# Patient Record
Sex: FEMALE | Race: WHITE | NOT HISPANIC OR LATINO | Employment: FULL TIME
[De-identification: names, ages, dates, MRNs, and addresses within clinical notes are randomized per-mention and may not be internally consistent; named-entity substitution may affect disease eponyms.]

---

## 2017-01-04 ENCOUNTER — RECORDS - HEALTHEAST (OUTPATIENT)
Dept: ADMINISTRATIVE | Facility: OTHER | Age: 67
End: 2017-01-04

## 2017-01-24 ENCOUNTER — RECORDS - HEALTHEAST (OUTPATIENT)
Dept: ADMINISTRATIVE | Facility: OTHER | Age: 67
End: 2017-01-24

## 2017-01-31 ENCOUNTER — RECORDS - HEALTHEAST (OUTPATIENT)
Dept: ADMINISTRATIVE | Facility: OTHER | Age: 67
End: 2017-01-31

## 2017-03-27 ENCOUNTER — OFFICE VISIT - HEALTHEAST (OUTPATIENT)
Dept: FAMILY MEDICINE | Facility: CLINIC | Age: 67
End: 2017-03-27

## 2017-03-27 DIAGNOSIS — Z01.818 PREOP EXAMINATION: ICD-10-CM

## 2017-03-27 DIAGNOSIS — Z78.0 POST-MENOPAUSAL: ICD-10-CM

## 2017-03-27 DIAGNOSIS — E03.9 HYPOTHYROID: ICD-10-CM

## 2017-03-27 DIAGNOSIS — H26.9 CATARACT, RIGHT: ICD-10-CM

## 2017-03-27 ASSESSMENT — MIFFLIN-ST. JEOR: SCORE: 1141.38

## 2017-04-28 ENCOUNTER — RECORDS - HEALTHEAST (OUTPATIENT)
Dept: ADMINISTRATIVE | Facility: OTHER | Age: 67
End: 2017-04-28

## 2017-11-06 ENCOUNTER — COMMUNICATION - HEALTHEAST (OUTPATIENT)
Dept: FAMILY MEDICINE | Facility: CLINIC | Age: 67
End: 2017-11-06

## 2017-11-06 DIAGNOSIS — E03.9 HYPOTHYROID: ICD-10-CM

## 2017-11-14 ENCOUNTER — COMMUNICATION - HEALTHEAST (OUTPATIENT)
Dept: TELEHEALTH | Facility: CLINIC | Age: 67
End: 2017-11-14

## 2018-01-08 ENCOUNTER — OFFICE VISIT - HEALTHEAST (OUTPATIENT)
Dept: FAMILY MEDICINE | Facility: CLINIC | Age: 68
End: 2018-01-08

## 2018-01-08 DIAGNOSIS — E03.9 HYPOTHYROIDISM: ICD-10-CM

## 2018-01-08 DIAGNOSIS — Z00.00 HEALTHCARE MAINTENANCE: ICD-10-CM

## 2018-01-08 DIAGNOSIS — H91.90 HEARING LOSS: ICD-10-CM

## 2018-01-08 DIAGNOSIS — M06.9 RHEUMATOID ARTHRITIS (H): ICD-10-CM

## 2018-01-08 LAB
CHOLEST SERPL-MCNC: 241 MG/DL
FASTING STATUS PATIENT QL REPORTED: NO
HDLC SERPL-MCNC: 93 MG/DL
LDLC SERPL CALC-MCNC: 131 MG/DL
TRIGL SERPL-MCNC: 87 MG/DL
TSH SERPL DL<=0.005 MIU/L-ACNC: 1.77 UIU/ML (ref 0.3–5)

## 2018-01-08 RX ORDER — IBUPROFEN 600 MG/1
600 TABLET, FILM COATED ORAL EVERY 6 HOURS PRN
Status: SHIPPED | COMMUNITY
Start: 2018-01-08

## 2018-01-31 ENCOUNTER — COMMUNICATION - HEALTHEAST (OUTPATIENT)
Dept: SCHEDULING | Facility: CLINIC | Age: 68
End: 2018-01-31

## 2018-03-19 ENCOUNTER — COMMUNICATION - HEALTHEAST (OUTPATIENT)
Dept: FAMILY MEDICINE | Facility: CLINIC | Age: 68
End: 2018-03-19

## 2018-03-19 DIAGNOSIS — E03.9 HYPOTHYROID: ICD-10-CM

## 2018-03-27 ENCOUNTER — RECORDS - HEALTHEAST (OUTPATIENT)
Dept: ADMINISTRATIVE | Facility: OTHER | Age: 68
End: 2018-03-27

## 2018-11-08 ENCOUNTER — COMMUNICATION - HEALTHEAST (OUTPATIENT)
Dept: FAMILY MEDICINE | Facility: CLINIC | Age: 68
End: 2018-11-08

## 2018-11-29 ENCOUNTER — COMMUNICATION - HEALTHEAST (OUTPATIENT)
Dept: FAMILY MEDICINE | Facility: CLINIC | Age: 68
End: 2018-11-29

## 2018-11-29 DIAGNOSIS — E03.9 HYPOTHYROID: ICD-10-CM

## 2018-12-03 ENCOUNTER — RECORDS - HEALTHEAST (OUTPATIENT)
Dept: ADMINISTRATIVE | Facility: OTHER | Age: 68
End: 2018-12-03

## 2019-01-24 ENCOUNTER — OFFICE VISIT - HEALTHEAST (OUTPATIENT)
Dept: FAMILY MEDICINE | Facility: CLINIC | Age: 69
End: 2019-01-24

## 2019-01-24 DIAGNOSIS — M17.0 PRIMARY OSTEOARTHRITIS OF BOTH KNEES: ICD-10-CM

## 2019-01-24 DIAGNOSIS — E03.9 HYPOTHYROIDISM, UNSPECIFIED TYPE: ICD-10-CM

## 2019-01-24 DIAGNOSIS — Z00.00 ROUTINE GENERAL MEDICAL EXAMINATION AT A HEALTH CARE FACILITY: ICD-10-CM

## 2019-01-24 DIAGNOSIS — Z12.31 VISIT FOR SCREENING MAMMOGRAM: ICD-10-CM

## 2019-01-24 LAB
CHOLEST SERPL-MCNC: 235 MG/DL
FASTING STATUS PATIENT QL REPORTED: YES
HDLC SERPL-MCNC: 104 MG/DL
LDLC SERPL CALC-MCNC: 112 MG/DL
TRIGL SERPL-MCNC: 93 MG/DL
TSH SERPL DL<=0.005 MIU/L-ACNC: 2.82 UIU/ML (ref 0.3–5)

## 2019-01-24 ASSESSMENT — MIFFLIN-ST. JEOR: SCORE: 1177.53

## 2019-02-19 ENCOUNTER — RECORDS - HEALTHEAST (OUTPATIENT)
Dept: MAMMOGRAPHY | Facility: CLINIC | Age: 69
End: 2019-02-19

## 2019-02-19 DIAGNOSIS — Z12.31 ENCOUNTER FOR SCREENING MAMMOGRAM FOR MALIGNANT NEOPLASM OF BREAST: ICD-10-CM

## 2019-03-14 ENCOUNTER — COMMUNICATION - HEALTHEAST (OUTPATIENT)
Dept: FAMILY MEDICINE | Facility: CLINIC | Age: 69
End: 2019-03-14

## 2019-03-14 DIAGNOSIS — E03.9 HYPOTHYROID: ICD-10-CM

## 2019-03-18 ENCOUNTER — OFFICE VISIT - HEALTHEAST (OUTPATIENT)
Dept: FAMILY MEDICINE | Facility: CLINIC | Age: 69
End: 2019-03-18

## 2019-03-18 DIAGNOSIS — R03.0 ELEVATED BP WITHOUT DIAGNOSIS OF HYPERTENSION: ICD-10-CM

## 2019-03-18 DIAGNOSIS — E03.9 HYPOTHYROIDISM, UNSPECIFIED TYPE: ICD-10-CM

## 2019-03-18 DIAGNOSIS — M17.0 PRIMARY OSTEOARTHRITIS OF BOTH KNEES: ICD-10-CM

## 2019-03-18 DIAGNOSIS — M85.89 OSTEOPENIA OF MULTIPLE SITES: ICD-10-CM

## 2019-03-18 DIAGNOSIS — Z76.89 ESTABLISHING CARE WITH NEW DOCTOR, ENCOUNTER FOR: ICD-10-CM

## 2019-03-18 DIAGNOSIS — M06.9 RHEUMATOID ARTHRITIS, INVOLVING UNSPECIFIED SITE, UNSPECIFIED RHEUMATOID FACTOR PRESENCE: ICD-10-CM

## 2019-04-01 ENCOUNTER — AMBULATORY - HEALTHEAST (OUTPATIENT)
Dept: NURSING | Facility: CLINIC | Age: 69
End: 2019-04-01

## 2019-08-26 ENCOUNTER — RECORDS - HEALTHEAST (OUTPATIENT)
Dept: ADMINISTRATIVE | Facility: OTHER | Age: 69
End: 2019-08-26

## 2019-09-23 ENCOUNTER — OFFICE VISIT - HEALTHEAST (OUTPATIENT)
Dept: FAMILY MEDICINE | Facility: CLINIC | Age: 69
End: 2019-09-23

## 2019-09-23 DIAGNOSIS — E03.9 HYPOTHYROIDISM, UNSPECIFIED TYPE: ICD-10-CM

## 2019-09-23 DIAGNOSIS — Z01.818 ENCOUNTER FOR PREOPERATIVE EXAMINATION FOR GENERAL SURGICAL PROCEDURE: ICD-10-CM

## 2019-09-23 DIAGNOSIS — H26.9 CATARACT OF LEFT EYE, UNSPECIFIED CATARACT TYPE: ICD-10-CM

## 2019-09-23 DIAGNOSIS — M85.89 OSTEOPENIA OF MULTIPLE SITES: ICD-10-CM

## 2019-09-23 LAB
ERYTHROCYTE [DISTWIDTH] IN BLOOD BY AUTOMATED COUNT: 11 % (ref 11–14.5)
HCT VFR BLD AUTO: 41.2 % (ref 35–47)
HGB BLD-MCNC: 13.9 G/DL (ref 12–16)
MCH RBC QN AUTO: 31.6 PG (ref 27–34)
MCHC RBC AUTO-ENTMCNC: 33.7 G/DL (ref 32–36)
MCV RBC AUTO: 94 FL (ref 80–100)
PLATELET # BLD AUTO: 234 THOU/UL (ref 140–440)
PMV BLD AUTO: 7.7 FL (ref 7–10)
RBC # BLD AUTO: 4.4 MILL/UL (ref 3.8–5.4)
WBC: 6.3 THOU/UL (ref 4–11)

## 2019-09-23 ASSESSMENT — MIFFLIN-ST. JEOR: SCORE: 1184.7

## 2019-09-24 ENCOUNTER — COMMUNICATION - HEALTHEAST (OUTPATIENT)
Dept: FAMILY MEDICINE | Facility: CLINIC | Age: 69
End: 2019-09-24

## 2019-09-24 LAB
ALBUMIN SERPL-MCNC: 4 G/DL (ref 3.5–5)
ALP SERPL-CCNC: 70 U/L (ref 45–120)
ALT SERPL W P-5'-P-CCNC: 13 U/L (ref 0–45)
ANION GAP SERPL CALCULATED.3IONS-SCNC: 7 MMOL/L (ref 5–18)
AST SERPL W P-5'-P-CCNC: 19 U/L (ref 0–40)
BILIRUB SERPL-MCNC: 0.4 MG/DL (ref 0–1)
BUN SERPL-MCNC: 22 MG/DL (ref 8–28)
CALCIUM SERPL-MCNC: 9.9 MG/DL (ref 8.5–10.5)
CHLORIDE BLD-SCNC: 102 MMOL/L (ref 98–107)
CO2 SERPL-SCNC: 28 MMOL/L (ref 22–31)
CREAT SERPL-MCNC: 1 MG/DL (ref 0.6–1.1)
GFR SERPL CREATININE-BSD FRML MDRD: 55 ML/MIN/1.73M2
GLUCOSE BLD-MCNC: 89 MG/DL (ref 70–125)
POTASSIUM BLD-SCNC: 5.3 MMOL/L (ref 3.5–5)
PROT SERPL-MCNC: 7.5 G/DL (ref 6–8)
SODIUM SERPL-SCNC: 137 MMOL/L (ref 136–145)
TSH SERPL DL<=0.005 MIU/L-ACNC: 2.64 UIU/ML (ref 0.3–5)

## 2019-09-26 ENCOUNTER — AMBULATORY - HEALTHEAST (OUTPATIENT)
Dept: SCHEDULING | Facility: CLINIC | Age: 69
End: 2019-09-26

## 2019-09-26 DIAGNOSIS — M85.89 OSTEOPENIA OF MULTIPLE SITES: ICD-10-CM

## 2019-09-29 ENCOUNTER — AMBULATORY - HEALTHEAST (OUTPATIENT)
Dept: FAMILY MEDICINE | Facility: CLINIC | Age: 69
End: 2019-09-29

## 2019-09-29 DIAGNOSIS — E87.5 HYPERKALEMIA: ICD-10-CM

## 2019-10-18 ENCOUNTER — AMBULATORY - HEALTHEAST (OUTPATIENT)
Dept: FAMILY MEDICINE | Facility: CLINIC | Age: 69
End: 2019-10-18

## 2019-10-18 DIAGNOSIS — M85.80 OSTEOPENIA WITH HIGH RISK OF FRACTURE: ICD-10-CM

## 2019-10-18 DIAGNOSIS — M85.89 OSTEOPENIA OF MULTIPLE SITES: ICD-10-CM

## 2019-10-22 ENCOUNTER — COMMUNICATION - HEALTHEAST (OUTPATIENT)
Dept: PHARMACY | Facility: CLINIC | Age: 69
End: 2019-10-22

## 2019-10-28 ENCOUNTER — COMMUNICATION - HEALTHEAST (OUTPATIENT)
Dept: NURSING | Facility: CLINIC | Age: 69
End: 2019-10-28

## 2019-10-29 ENCOUNTER — COMMUNICATION - HEALTHEAST (OUTPATIENT)
Dept: NURSING | Facility: CLINIC | Age: 69
End: 2019-10-29

## 2019-11-08 ENCOUNTER — COMMUNICATION - HEALTHEAST (OUTPATIENT)
Dept: SCHEDULING | Facility: CLINIC | Age: 69
End: 2019-11-08

## 2019-11-12 ENCOUNTER — COMMUNICATION - HEALTHEAST (OUTPATIENT)
Dept: FAMILY MEDICINE | Facility: CLINIC | Age: 69
End: 2019-11-12

## 2019-11-12 DIAGNOSIS — M06.9 RHEUMATOID ARTHRITIS, INVOLVING UNSPECIFIED SITE, UNSPECIFIED RHEUMATOID FACTOR PRESENCE: ICD-10-CM

## 2020-02-18 ENCOUNTER — COMMUNICATION - HEALTHEAST (OUTPATIENT)
Dept: FAMILY MEDICINE | Facility: CLINIC | Age: 70
End: 2020-02-18

## 2020-02-18 DIAGNOSIS — E03.9 HYPOTHYROID: ICD-10-CM

## 2020-02-20 ENCOUNTER — COMMUNICATION - HEALTHEAST (OUTPATIENT)
Dept: FAMILY MEDICINE | Facility: CLINIC | Age: 70
End: 2020-02-20

## 2020-03-02 ENCOUNTER — COMMUNICATION - HEALTHEAST (OUTPATIENT)
Dept: SCHEDULING | Facility: CLINIC | Age: 70
End: 2020-03-02

## 2020-03-02 ENCOUNTER — OFFICE VISIT - HEALTHEAST (OUTPATIENT)
Dept: FAMILY MEDICINE | Facility: CLINIC | Age: 70
End: 2020-03-02

## 2020-03-02 DIAGNOSIS — J06.9 VIRAL URI: ICD-10-CM

## 2020-03-02 DIAGNOSIS — B30.9 ACUTE VIRAL CONJUNCTIVITIS OF BOTH EYES: ICD-10-CM

## 2020-03-02 ASSESSMENT — MIFFLIN-ST. JEOR: SCORE: 1217.58

## 2020-04-13 ENCOUNTER — COMMUNICATION - HEALTHEAST (OUTPATIENT)
Dept: FAMILY MEDICINE | Facility: CLINIC | Age: 70
End: 2020-04-13

## 2020-04-28 ENCOUNTER — COMMUNICATION - HEALTHEAST (OUTPATIENT)
Dept: FAMILY MEDICINE | Facility: CLINIC | Age: 70
End: 2020-04-28

## 2020-04-28 DIAGNOSIS — M06.9 RHEUMATOID ARTHRITIS, INVOLVING UNSPECIFIED SITE, UNSPECIFIED RHEUMATOID FACTOR PRESENCE: ICD-10-CM

## 2020-05-20 ENCOUNTER — RECORDS - HEALTHEAST (OUTPATIENT)
Dept: ADMINISTRATIVE | Facility: OTHER | Age: 70
End: 2020-05-20

## 2020-08-17 ENCOUNTER — COMMUNICATION - HEALTHEAST (OUTPATIENT)
Dept: FAMILY MEDICINE | Facility: CLINIC | Age: 70
End: 2020-08-17

## 2020-08-17 DIAGNOSIS — E03.9 HYPOTHYROID: ICD-10-CM

## 2020-11-20 ENCOUNTER — RECORDS - HEALTHEAST (OUTPATIENT)
Dept: ADMINISTRATIVE | Facility: OTHER | Age: 70
End: 2020-11-20

## 2020-11-20 ENCOUNTER — COMMUNICATION - HEALTHEAST (OUTPATIENT)
Dept: FAMILY MEDICINE | Facility: CLINIC | Age: 70
End: 2020-11-20

## 2020-11-20 DIAGNOSIS — E03.9 HYPOTHYROID: ICD-10-CM

## 2020-12-04 ENCOUNTER — OFFICE VISIT - HEALTHEAST (OUTPATIENT)
Dept: FAMILY MEDICINE | Facility: CLINIC | Age: 70
End: 2020-12-04

## 2020-12-04 DIAGNOSIS — Z11.59 ENCOUNTER FOR HEPATITIS C SCREENING TEST FOR LOW RISK PATIENT: ICD-10-CM

## 2020-12-04 DIAGNOSIS — Z71.89 ADVANCED DIRECTIVES, COUNSELING/DISCUSSION: ICD-10-CM

## 2020-12-04 DIAGNOSIS — E03.9 HYPOTHYROIDISM, UNSPECIFIED TYPE: ICD-10-CM

## 2020-12-04 DIAGNOSIS — M85.80 OSTEOPENIA WITH HIGH RISK OF FRACTURE: ICD-10-CM

## 2020-12-04 DIAGNOSIS — Z12.31 VISIT FOR SCREENING MAMMOGRAM: ICD-10-CM

## 2020-12-04 DIAGNOSIS — M15.0 PRIMARY OSTEOARTHRITIS INVOLVING MULTIPLE JOINTS: ICD-10-CM

## 2020-12-04 DIAGNOSIS — Z13.220 LIPID SCREENING: ICD-10-CM

## 2020-12-04 DIAGNOSIS — Z00.00 ROUTINE GENERAL MEDICAL EXAMINATION AT A HEALTH CARE FACILITY: ICD-10-CM

## 2020-12-04 LAB
ALBUMIN SERPL-MCNC: 4 G/DL (ref 3.5–5)
ALP SERPL-CCNC: 71 U/L (ref 45–120)
ALT SERPL W P-5'-P-CCNC: 14 U/L (ref 0–45)
ANION GAP SERPL CALCULATED.3IONS-SCNC: 11 MMOL/L (ref 5–18)
AST SERPL W P-5'-P-CCNC: 18 U/L (ref 0–40)
BILIRUB SERPL-MCNC: 0.6 MG/DL (ref 0–1)
BUN SERPL-MCNC: 19 MG/DL (ref 8–28)
CALCIUM SERPL-MCNC: 9.8 MG/DL (ref 8.5–10.5)
CHLORIDE BLD-SCNC: 103 MMOL/L (ref 98–107)
CHOLEST SERPL-MCNC: 220 MG/DL
CO2 SERPL-SCNC: 25 MMOL/L (ref 22–31)
CREAT SERPL-MCNC: 0.84 MG/DL (ref 0.6–1.1)
ERYTHROCYTE [DISTWIDTH] IN BLOOD BY AUTOMATED COUNT: 10.9 % (ref 11–14.5)
FASTING STATUS PATIENT QL REPORTED: YES
GFR SERPL CREATININE-BSD FRML MDRD: >60 ML/MIN/1.73M2
GLUCOSE BLD-MCNC: 102 MG/DL (ref 70–125)
HCT VFR BLD AUTO: 45.5 % (ref 35–47)
HDLC SERPL-MCNC: 88 MG/DL
HGB BLD-MCNC: 15 G/DL (ref 12–16)
LDLC SERPL CALC-MCNC: 113 MG/DL
MCH RBC QN AUTO: 30.5 PG (ref 27–34)
MCHC RBC AUTO-ENTMCNC: 33 G/DL (ref 32–36)
MCV RBC AUTO: 92 FL (ref 80–100)
PLATELET # BLD AUTO: 266 THOU/UL (ref 140–440)
PMV BLD AUTO: 7.8 FL (ref 7–10)
POTASSIUM BLD-SCNC: 4.8 MMOL/L (ref 3.5–5)
PROT SERPL-MCNC: 7.6 G/DL (ref 6–8)
RBC # BLD AUTO: 4.92 MILL/UL (ref 3.8–5.4)
SODIUM SERPL-SCNC: 139 MMOL/L (ref 136–145)
TRIGL SERPL-MCNC: 95 MG/DL
TSH SERPL DL<=0.005 MIU/L-ACNC: 3.04 UIU/ML (ref 0.3–5)
WBC: 6 THOU/UL (ref 4–11)

## 2020-12-04 ASSESSMENT — MIFFLIN-ST. JEOR: SCORE: 1208.28

## 2020-12-07 LAB
25(OH)D3 SERPL-MCNC: 90 NG/ML (ref 30–80)
25(OH)D3 SERPL-MCNC: 90 NG/ML (ref 30–80)
HCV AB SERPL QL IA: NEGATIVE

## 2021-01-18 ENCOUNTER — COMMUNICATION - HEALTHEAST (OUTPATIENT)
Dept: FAMILY MEDICINE | Facility: CLINIC | Age: 71
End: 2021-01-18

## 2021-01-18 DIAGNOSIS — E03.9 HYPOTHYROID: ICD-10-CM

## 2021-01-18 DIAGNOSIS — M06.9 RHEUMATOID ARTHRITIS (H): ICD-10-CM

## 2021-01-18 RX ORDER — LEVOTHYROXINE SODIUM 50 UG/1
50 TABLET ORAL DAILY
Qty: 90 TABLET | Refills: 3 | Status: SHIPPED | OUTPATIENT
Start: 2021-01-18 | End: 2021-11-18

## 2021-01-18 RX ORDER — GABAPENTIN 600 MG/1
600 TABLET ORAL AT BEDTIME
Qty: 90 TABLET | Refills: 3 | Status: SHIPPED | OUTPATIENT
Start: 2021-01-18 | End: 2021-12-24

## 2021-02-22 ENCOUNTER — RECORDS - HEALTHEAST (OUTPATIENT)
Dept: MAMMOGRAPHY | Facility: CLINIC | Age: 71
End: 2021-02-22

## 2021-02-22 DIAGNOSIS — Z12.31 ENCOUNTER FOR SCREENING MAMMOGRAM FOR MALIGNANT NEOPLASM OF BREAST: ICD-10-CM

## 2021-03-04 ENCOUNTER — AMBULATORY - HEALTHEAST (OUTPATIENT)
Dept: NURSING | Facility: CLINIC | Age: 71
End: 2021-03-04

## 2021-03-25 ENCOUNTER — AMBULATORY - HEALTHEAST (OUTPATIENT)
Dept: NURSING | Facility: CLINIC | Age: 71
End: 2021-03-25

## 2021-04-30 ENCOUNTER — RECORDS - HEALTHEAST (OUTPATIENT)
Dept: ADMINISTRATIVE | Facility: OTHER | Age: 71
End: 2021-04-30

## 2021-05-25 ENCOUNTER — RECORDS - HEALTHEAST (OUTPATIENT)
Dept: ADMINISTRATIVE | Facility: OTHER | Age: 71
End: 2021-05-25

## 2021-05-25 ENCOUNTER — RECORDS - HEALTHEAST (OUTPATIENT)
Dept: ADMINISTRATIVE | Facility: CLINIC | Age: 71
End: 2021-05-25

## 2021-05-26 ENCOUNTER — RECORDS - HEALTHEAST (OUTPATIENT)
Dept: ADMINISTRATIVE | Facility: CLINIC | Age: 71
End: 2021-05-26

## 2021-05-27 NOTE — PROGRESS NOTES
I met with Lesly Hutchinson at the request of Dr Jones to recheck her blood pressure.  Blood pressure medications on the MAR were reviewed with patient.    Patient has taken all medications as per usual regimen: N/A  Patient reports tolerating them without any issues or concerns: N/A    Vitals:    04/01/19 0851   BP: 131/69   Pulse: 90       Blood pressure was taken, previous encounter was reviewed, recorded blood pressure below 140/90.  Patient was discharged and the note will be sent to the provider for final review.

## 2021-05-27 NOTE — PROGRESS NOTES
Please let patient know: I have reviewed her blood pressure check. In good range. No medication needed at this time.

## 2021-05-28 ENCOUNTER — RECORDS - HEALTHEAST (OUTPATIENT)
Dept: ADMINISTRATIVE | Facility: CLINIC | Age: 71
End: 2021-05-28

## 2021-05-29 ENCOUNTER — RECORDS - HEALTHEAST (OUTPATIENT)
Dept: ADMINISTRATIVE | Facility: CLINIC | Age: 71
End: 2021-05-29

## 2021-05-30 ENCOUNTER — RECORDS - HEALTHEAST (OUTPATIENT)
Dept: ADMINISTRATIVE | Facility: CLINIC | Age: 71
End: 2021-05-30

## 2021-05-30 VITALS — BODY MASS INDEX: 24.33 KG/M2 | HEIGHT: 64 IN | WEIGHT: 142.5 LBS

## 2021-05-31 VITALS — WEIGHT: 151.5 LBS | BODY MASS INDEX: 26 KG/M2

## 2021-06-01 ENCOUNTER — RECORDS - HEALTHEAST (OUTPATIENT)
Dept: ADMINISTRATIVE | Facility: CLINIC | Age: 71
End: 2021-06-01

## 2021-06-01 NOTE — PROGRESS NOTES
Preoperative Exam    Scheduled Procedure: cataract   Surgery Date:  10/1/19  Surgery Location: [facility]    Surgeon:  Dr. Tarango    Assessment/Plan:     Lesly was seen today for pre-op exam.    Encounter for preoperative examination for general surgical procedure  Cataract of left eye, unspecified cataract type  -     Comprehensive Metabolic Panel  -     HM2(CBC w/o Differential)    Osteopenia of multiple sites  Discussed osteopenia and needs for follow bone scan. Patient agreed  -     DXA Bone Density Scan; Future    Hypothyroidism, unspecified type  Due for thyroid lab check  -     Thyroid Upperglade    Other orders  Updated flu shot  -     Influenza High Dose, Seasonal 65+ yrs      Surgical Procedure Risk: Low (reported cardiac risk generally < 1%)  Have you had prior anesthesia?: Yes  Have you or any family members had a previous anesthesia reaction:  No  Do you or any family members have a history of a clotting or bleeding disorder?: No  Cardiac Risk Assessment: no increased risk for major cardiac complications    APPROVAL GIVEN to proceed with proposed procedure, without further diagnostic evaluation    Functional Status: Independent  Patient plans to recover at home alone.     Subjective:      Lesly Hutchinson is a 69 y.o. female who presents for a preoperative consultation.  Will be undergoing left sided cataract surgery. Has gone through right sided cataract surgery and did well. No question or concern today. Due for routine lab as well. Wondering if she needs bone density scan.     All other systems reviewed and are negative, other than those listed in the HPI.    Pertinent History  Do you have difficulty breathing or chest pain after walking up a flight of stairs: No  History of obstructive sleep apnea: No  Steroid use in the last 6 months: No  Frequent Aspirin/NSAID use: Yes: Ibuprofen   Prior Blood Transfusion: No  Prior Blood Transfusion Reaction: No  If for some reason prior to, during or after the  procedure, if it is medically indicated, would you be willing to have a blood transfusion?:  There is no transfusion refusal.    Current Outpatient Medications   Medication Sig Dispense Refill     calcium-vitamin D (CALCIUM-VITAMIN D) 500 mg(1,250mg) -200 unit per tablet Take 1 tablet by mouth.       gabapentin (NEURONTIN) 600 MG tablet Take 1 tablet (600 mg total) by mouth at bedtime. 90 tablet 3     ibuprofen (ADVIL,MOTRIN) 600 MG tablet Take 600 mg by mouth every 6 (six) hours as needed for pain.       levothyroxine (SYNTHROID, LEVOTHROID) 50 MCG tablet TAKE 1 TABLET EVERY DAY 90 tablet 3     multivitamin capsule Take 1 capsule by mouth daily.       No current facility-administered medications for this visit.         Allergies   Allergen Reactions     Sulfa (Sulfonamide Antibiotics)        Patient Active Problem List   Diagnosis     Benign Adenomatous Polyp Of The Large Intestine     Menopause     Osteopenia of multiple sites     Hypothyroidism     Rheumatoid arthritis (H)     Primary osteoarthritis of both knees       No past medical history on file.    Past Surgical History:   Procedure Laterality Date     FINGER GANGLION CYST EXCISION Right 10/10/2016    mucinous cyst     HYSTERECTOMY  2002?     KNEE ARTHROSCOPY Left 04/2016     OOPHORECTOMY Bilateral 2002?     MS CORRJ HALLUX VALGUS W/SESMDC W/2 OSTEOT      Description: Hallux Valgus (Bunion) Correction;  Recorded: 09/01/2009;     MS DILATION/CURETTAGE,DIAGNOSTIC      Description: Dilation And Curettage;  Recorded: 09/01/2009;     MS OSTEOTOMY 1ST METATARSAL,BASE/SHAFT      Description: Metatarsal Osteotomy Of The First Metatarsal;  Recorded: 09/01/2009;       Social History     Socioeconomic History     Marital status:      Spouse name: Not on file     Number of children: Not on file     Years of education: Not on file     Highest education level: Not on file   Occupational History     Not on file   Social Needs     Financial resource strain: Not on  "file     Food insecurity:     Worry: Not on file     Inability: Not on file     Transportation needs:     Medical: Not on file     Non-medical: Not on file   Tobacco Use     Smoking status: Former Smoker     Smokeless tobacco: Former User     Tobacco comment: quit 21yrs ago   Substance and Sexual Activity     Alcohol use: Not on file     Drug use: Not on file     Sexual activity: Not Currently   Lifestyle     Physical activity:     Days per week: Not on file     Minutes per session: Not on file     Stress: Not on file   Relationships     Social connections:     Talks on phone: Not on file     Gets together: Not on file     Attends Jainism service: Not on file     Active member of club or organization: Not on file     Attends meetings of clubs or organizations: Not on file     Relationship status: Not on file     Intimate partner violence:     Fear of current or ex partner: Not on file     Emotionally abused: Not on file     Physically abused: Not on file     Forced sexual activity: Not on file   Other Topics Concern     Not on file   Social History Narrative     and single now. Ex  passed away. 3 children, all adults. 7 grandchildren. Lives alone. Quit smoking 1994. Social alcohol. No hx of rec drug use.  when she was still working.        Patient Care Team:  Lilliam Jones MD as PCP - General (Family Medicine)  Lilliam Jones MD as Assigned PCP          Objective:     Vitals:    09/23/19 1737   BP: 138/68   Pulse: 68   Resp: 16   Temp: 98  F (36.7  C)   TempSrc: Oral   Weight: 154 lb 3.2 oz (69.9 kg)   Height: 5' 3.39\" (1.61 m)       Physical Exam:  General Appearance: Alert, cooperative, no distress, appears stated age, obese  Head: Normocephalic, without obvious abnormality, atraumatic  Eyes: PERRL, conjunctiva/corneas clear, EOM's intact  Ears: Normal TM's and external ear canals, both ears  Nose: Nares normal, septum midline,mucosa normal, no drainage  Throat: Lips, mucosa, " and tongue normal; teeth and gums normal  Neck: Supple, symmetrical, trachea midline, no adenopathy;  thyroid: not enlarged, symmetric, no tenderness/mass/nodules; no carotid bruit  Back: Symmetric, no curvature, ROM normal  Lungs: Clear to auscultation bilaterally, respirations unlabored  Heart: Regular rate and rhythm, S1 and S2 normal, no murmur rub or gallop  Abdomen: Soft, non-tender, bowel sounds active all four quadrants,  no masses, no organomegaly  Genitourinary: Not performed  Musculoskeletal: Normal range of motion.    Extremities: Extremities normal, atraumatic, no cyanosis,   Skin: Limited skin examination is unremarkable  Lymph nodes: Cervical, supraclavicular, and axillary nodes normal  Neurologic: alert, has normal reflexes.  answers questions appropriately, sensation intact throughout.   Psychiatric: normal mood and affect.         Patient Instructions   Please let me know fax number of your ophthalmologist to send the preop note to.     Continue with your medications    Follow your surgeon's direction on when to stop eating and drinking prior to surgery.  Your surgeon will be managing your pain after your surgery.        EKG:  Not indicated. Not performed.     Labs:  Recent Results (from the past 120 hour(s))   2(CBC w/o Differential)    Collection Time: 09/23/19  6:23 PM   Result Value Ref Range    WBC 6.3 4.0 - 11.0 thou/uL    RBC 4.40 3.80 - 5.40 mill/uL    Hemoglobin 13.9 12.0 - 16.0 g/dL    Hematocrit 41.2 35.0 - 47.0 %    MCV 94 80 - 100 fL    MCH 31.6 27.0 - 34.0 pg    MCHC 33.7 32.0 - 36.0 g/dL    RDW 11.0 11.0 - 14.5 %    Platelets 234 140 - 440 thou/uL    MPV 7.7 7.0 - 10.0 fL       Immunization History   Administered Date(s) Administered     DT (pediatric) 01/07/2003     Hep A, historic 05/09/2007, 09/01/2009     Influenza, inj, historic,unspecified 09/28/2007, 10/14/2008, 11/12/2016     Influenza, seasonal,quad inj 6-35 mos 12/08/2009, 12/07/2010, 11/29/2011, 11/06/2012     Pneumo  Conj 13-V (2010&after) 07/23/2015     Pneumo Polysac 23-V 07/14/2014     Td, adult adsorbed, PF 01/24/2019     Td,adult,historic,unspecified 01/07/2003     Tdap 09/01/2009       Electronically signed by Lilliam Jones MD 09/23/19 5:32 PM

## 2021-06-01 NOTE — TELEPHONE ENCOUNTER
Please call patient: she needs to let us know fax number for her ophthalmologist so we can fax preop note

## 2021-06-01 NOTE — PATIENT INSTRUCTIONS - HE
Please let me know fax number of your ophthalmologist to send the preop note to.     Continue with your medications    Follow your surgeon's direction on when to stop eating and drinking prior to surgery.  Your surgeon will be managing your pain after your surgery.

## 2021-06-02 ENCOUNTER — RECORDS - HEALTHEAST (OUTPATIENT)
Dept: ADMINISTRATIVE | Facility: CLINIC | Age: 71
End: 2021-06-02

## 2021-06-02 VITALS — WEIGHT: 150 LBS | BODY MASS INDEX: 25.61 KG/M2 | HEIGHT: 64 IN

## 2021-06-02 VITALS — BODY MASS INDEX: 26.05 KG/M2 | WEIGHT: 152.4 LBS

## 2021-06-02 NOTE — TELEPHONE ENCOUNTER
MTM is not covered by patients insurance and so she declined to schedule at this time.  Would like you to explore covered options for her.    Thanks  [place], MTM Coordinator

## 2021-06-02 NOTE — TELEPHONE ENCOUNTER
Third and final attempt to reach her by phone.  I have now sent her my chart message describing this conversation.  I will follow-up via my chart.

## 2021-06-02 NOTE — TELEPHONE ENCOUNTER
Attempted #1 to reach Lesly.  Reviewed rheumatology notes and discussed with Dr. Jones.  I will reach out with again tomorrow to discuss further.

## 2021-06-02 NOTE — TELEPHONE ENCOUNTER
Attempted #2 to reach Lesly to discuss osteoporosis treatment verses monitoring.      I have performed a chart review, and discussed with Dr. Jones.  I have also consulted with Dr. Butler, regarding her osteoporosis.  She has a history of treatment with bisphosphonates for 3 years, with improvement on DEXA scan.  Her DEXA scans from 2016 until now were performed on a different DEXA machines, however they are somewhat stable, yet not able to compare them directly.  With history of diagnosis of osteoporosis back in 2006, and delayed treatment per chart review until 2012.  Current T score is suggestive of osteopenia with high risk of fracture, however it may be appropriate to maximize calcium and vitamin D as well as weightbearing exercise and recheck in 2 years, at that time will reevaluate for appropriateness of treatment, if further decline in bone density.

## 2021-06-03 VITALS
WEIGHT: 154.2 LBS | SYSTOLIC BLOOD PRESSURE: 138 MMHG | TEMPERATURE: 98 F | DIASTOLIC BLOOD PRESSURE: 68 MMHG | RESPIRATION RATE: 16 BRPM | HEART RATE: 68 BPM | BODY MASS INDEX: 27.32 KG/M2 | HEIGHT: 63 IN

## 2021-06-03 NOTE — TELEPHONE ENCOUNTER
"  CC:  Cold Sx x 2-3 days        > Runny nose + stuffy    > No fever   > No sore throat     > Yes throat drainage     > Occasional HA   > No ear pain   > No wheezing / No shortness of breath         A/P:     Discussed at home routine cold / flu symptoms management including     >Fluids - body needs to stay hydrated - drink more water to stay hydrated - warm liquids     >Nutrition - body needs the extra calories when ill - eat healthy when you can   >Rest - rest when you can - try and get a good night's sleep   > OTC pain relievers - follow label directions for dosing   >Read labels of any OTC cold medications to prevent accidental \"double dosing\" of ingredients   > Humidifier / steam showers - these are excellent ways to help stuffy noses, nasal or chest congestion and with any coughing or sore throats      > Watch your temp and breathing - call back if you start spiking a high temperatureor have any breathing problems or if the sx change or worsen in some way         Chandler Melgar RN   Triage and Medication Refills          Reason for Disposition    Colds with no complications    Protocols used: COMMON COLD-A-OH      "

## 2021-06-04 VITALS
TEMPERATURE: 97.8 F | DIASTOLIC BLOOD PRESSURE: 56 MMHG | OXYGEN SATURATION: 98 % | WEIGHT: 161.75 LBS | BODY MASS INDEX: 28.66 KG/M2 | HEART RATE: 92 BPM | SYSTOLIC BLOOD PRESSURE: 122 MMHG | HEIGHT: 63 IN | RESPIRATION RATE: 20 BRPM

## 2021-06-05 VITALS
HEIGHT: 64 IN | BODY MASS INDEX: 27 KG/M2 | DIASTOLIC BLOOD PRESSURE: 77 MMHG | HEART RATE: 78 BPM | WEIGHT: 158.13 LBS | SYSTOLIC BLOOD PRESSURE: 141 MMHG

## 2021-06-06 NOTE — PROGRESS NOTES
Chief Complaint   Patient presents with     Eye Problem     discharge from both eyes; crusty eyes in the morning; pt states they feel itchy (pt does have seasonal allergy)     Cough     fever on Saturday (100F), none since         HPI:   Lesly Hutchinson is a 69 y.o. female started with stuffy nose three days ago.  The next day had mild fever.  Eyes started severe watering.  Eyes crusted shut the next morning.  Vision is a little blurry.  No pain.  Mild itching.  Nose is still drippy.  Slight cough.  No shortness of breath.  No chest pain.  No ear pain.  Mild sore throat.  No stomach symptoms.    She used vigamox eye drops yesterday that she had left from cataract surgery last fall    Co worker has been coughing.    ROS:  A 10 point comprehensive review of systems was negative except as noted.     Medications:  Current Outpatient Medications on File Prior to Visit   Medication Sig Dispense Refill     calcium-vitamin D (CALCIUM-VITAMIN D) 500 mg(1,250mg) -200 unit per tablet Take 1 tablet by mouth.       ibuprofen (ADVIL,MOTRIN) 600 MG tablet Take 600 mg by mouth every 6 (six) hours as needed for pain.       levothyroxine (SYNTHROID, LEVOTHROID) 50 MCG tablet TAKE 1 TABLET EVERY DAY 90 tablet 1     moxifloxacin (VIGAMOX) 0.5 % ophthalmic solution Start 1 day before surgery- Instill one drop in operative eye 4 times per day for one week following surgery, then stop.  1     multivitamin capsule Take 1 capsule by mouth daily.       gabapentin (NEURONTIN) 600 MG tablet Take 1 tablet (600 mg total) by mouth at bedtime. 90 tablet 1     No current facility-administered medications on file prior to visit.          Social History:  Social History     Tobacco Use     Smoking status: Former Smoker     Smokeless tobacco: Former User     Tobacco comment: quit 21yrs ago   Substance Use Topics     Alcohol use: Not on file         Physical Exam:   Vitals:    03/02/20 1154   BP: 122/56   Pulse: 92   Resp: 20   Temp: 97.8  F (36.6  C)  "  TempSrc: Oral   SpO2: 98%   Weight: 161 lb 12 oz (73.4 kg)   Height: 5' 3.3\" (1.608 m)       GENERAL:   Alert. Oriented.  EYES: bilateral conjunctival injection.  PERRL  HENT:  Ears: R TM pearly gray. Normal landmarks. L TM pearly gray.  Normal landmarks  Nose: Clear.  Sinuses: Nontender.  Oropharynx:  No erythema. No exudate.  NECK: Supple. No adenopathy.  LUNGS: Clear to ascultation.  No crackles.  No wheezing  HEART: RRR  SKIN:  No rash.         Assessment/Plan:    1. Viral URI     2. Acute viral conjunctivitis of both eyes        Patient appears only mildly ill.  Discussed viral conjunctivitis.  No antibiotics indicated-if she wants to use the vigamox, she can  Antibiotic eye drops as directed.  Warm or cool compresses to eyes as needed.  Lubricating eye drops as desired.  No contacts until treatment completed.  Good hand hygiene emphasized.  F/U if worsening or not improving.         Rosa Isela Connors MD      3/2/2020    The following portions of the patient's history were reviewed and updated as appropriate: allergies, current medications, past family history, past medical history, past social history, past surgical history and problem list.      "

## 2021-06-06 NOTE — TELEPHONE ENCOUNTER
Refill Approved    Rx renewed per Medication Renewal Policy. Medication was last renewed on 3/18/19.    Sydney Panchal, [facility] Triage/Med Refill 2/20/2020     Requested Prescriptions   Pending Prescriptions Disp Refills     levothyroxine (SYNTHROID, LEVOTHROID) 50 MCG tablet 90 tablet 3     Sig: TAKE 1 TABLET EVERY DAY       Thyroid Hormones Protocol Passed - 2/20/2020 12:15 PM        Passed - Provider visit in past 12 months or next 3 months     Last office visit with prescriber/PCP: 3/18/2019 Lilliam Jones MD OR same dept: 3/18/2019 Lilliam Jones MD OR same specialty: 3/18/2019 Lilliam Jones MD  Last physical: 9/23/2019 Last MTM visit: Visit date not found   Next visit within 3 mo: Visit date not found  Next physical within 3 mo: Visit date not found  Prescriber OR PCP: Lilliam Jones MD  Last diagnosis associated with med order: 1. Hypothyroid  - levothyroxine (SYNTHROID, LEVOTHROID) 50 MCG tablet; TAKE 1 TABLET EVERY DAY  Dispense: 90 tablet; Refill: 3    If protocol passes may refill for 12 months if within 3 months of last provider visit (or a total of 15 months).             Passed - TSH on file in past 12 months for patient age 12 & older     TSH   Date Value Ref Range Status   09/23/2019 2.64 0.30 - 5.00 uIU/mL Final

## 2021-06-06 NOTE — TELEPHONE ENCOUNTER
Requested Prescriptions     Pending Prescriptions Disp Refills     levothyroxine (SYNTHROID, LEVOTHROID) 50 MCG tablet 90 tablet 3     Sig: TAKE 1 TABLET EVERY DAY       Last Office Visit:    03/18/19  5. Hypothyroidism, unspecified type  Last thyroid check 1/24/2019 and unremarkable      Last Refill:     Disp Refills Start End NICHO   levothyroxine (SYNTHROID, LEVOTHROID) 50 MCG tablet 90 tablet 3 3/18/2019  No   Sig: TAKE 1 TABLET EVERY DAY   Sent to pharmacy as: levothyroxine (SYNTHROID, LEVOTHROID) 50 MCG tablet       CSA:  N/A  Date of Last Labs:     Ref Range & Units 9/23/19 1829     TSH 0.30 - 5.00 uIU/mL 2.64

## 2021-06-06 NOTE — TELEPHONE ENCOUNTER
"Pt reports calling to scheduled an appointment for possible cold and eye drainage, was transferred to triage r/t symptoms.     Started to have a \"cold\" on Friday-Saturday \"same thing, little different because I wasn't sneezing/coughing right away\"     Pt reports her eyes were watering heavily.   Eyes were \"crusty\" and blurring all day yesterday as well. Improved by the end of the day.     Eyes are still \"crusty\" this morning and coughing.   Yellow drainage-thick.   Temp Saturday night was 100.0- no other noted temp.   Swallowing sputum- no known blood or taste of blood to sputum.   \"just really started coughing today\"   Yesterday took lozenges three times a day \"to cut down on the cold\". Has not tried any other medications.   No pain to eyes.   \"skin around my eyes is raw like blowing my nose constantly yesterday\" Improved today.   Eye lids were swollen yesterday, this has improved today as well. Reports it is still easily seen today.   No change in vision other than when coated with drainage.     Pt would like to be seen. Discussed home care per protocol. Discussed possibility of viral symptoms.     Transferred to [facility] with [facility]. No appointment with [facility] today. Pt scheduled with [facility] this AM at 11:40am with Dr. NOELLE Connors.     Carla Carballo, RN Supervisor Triage Nurse Advisor      Reason for Disposition    Patient wants to be seen    Protocols used: EYE - PUS OR [id number]      "

## 2021-06-07 NOTE — TELEPHONE ENCOUNTER
Refill Approved    Rx renewed per Medication Renewal Policy. Medication was last renewed on 11/14/19.    Sydney Panchal, [facility] Triage/Med Refill 4/30/2020     Requested Prescriptions   Pending Prescriptions Disp Refills     gabapentin (NEURONTIN) 600 MG tablet [Pharmacy Med Name: GABAPENTIN 600 MG Tablet] 90 tablet 1     Sig: TAKE 1 TABLET (600 MG TOTAL) BY MOUTH AT BEDTIME.       Gabapentin/Levetiracetam/Tiagabine Refill Protocol  Passed - 4/28/2020  1:09 PM        Passed - PCP or prescribing provider visit in past 12 months or next 3 months     Last office visit with prescriber/PCP: 3/18/2019 Lilliam Jones MD OR same dept: Visit date not found OR same specialty: 3/2/2020 Rosa Isela Connors MD  Last physical: 9/23/2019 Last MTM visit: Visit date not found   Next visit within 3 mo: Visit date not found  Next physical within 3 mo: Visit date not found  Prescriber OR PCP: Lilliam Jones MD  Last diagnosis associated with med order: 1. Rheumatoid arthritis, involving unspecified site, unspecified rheumatoid factor presence (H)  - gabapentin (NEURONTIN) 600 MG tablet [Pharmacy Med Name: GABAPENTIN 600 MG Tablet]; Take 1 tablet (600 mg total) by mouth at bedtime.  Dispense: 90 tablet; Refill: 1    If protocol passes may refill for 12 months if within 3 months of last provider visit (or a total of 15 months).

## 2021-06-07 NOTE — TELEPHONE ENCOUNTER
Who is requesting the letter?  Patient  Why is the letter needed? The patient is asking for a letter stating due to her age she is high risk for COVID-19. She is utilizing the Emergency Paid Sick leave.  The patient cannot work from home and is in a five story building with lots of employees that make keeping the 6 feet of space unlikely.  She is having issues with her MyChart at home.  She is asking to have the letter mailed and call her when the letter is sent off.    How would you like this letter returned? Mail to address in chart.  Address verified.    Okay to leave a detailed message? Yes [phone]

## 2021-06-09 NOTE — PROGRESS NOTES
Preoperative Exam    Chief Complaint   Patient presents with     Pre-op Exam     Cataracts; Right Eye--DOS 4/17/2017; [facility] with Dr. Dorian Tarango       HPI: Patient is a 67 y.o. female with decreased vision R eye related to cataracts, worsening over a while now, who is coming to the clinic for preoperative evaluation in regards to the above diagnosis. Patient has discussed the risks, benefits, alternatives to the upcoming surgery with the surgeon. She has RA, followed by [facility] rheumatology, and hypothyroidism.    No past medical history on file.  Past Surgical History:   Procedure Laterality Date     FINGER GANGLION CYST EXCISION Right 10/10/2016    mucinous cyst     HYSTERECTOMY  2002?     KNEE ARTHROSCOPY Left 04/2016     OOPHORECTOMY Bilateral 2002?     AK CORRJ HALLUX VALGUS W/SESMDC W/2 OSTEOT      Description: Hallux Valgus (Bunion) Correction;  Recorded: 09/01/2009;     AK DILATION/CURETTAGE,DIAGNOSTIC      Description: Dilation And Curettage;  Recorded: 09/01/2009;     AK OSTEOTOMY 1ST METATARSAL,BASE/SHAFT      Description: Metatarsal Osteotomy Of The First Metatarsal;  Recorded: 09/01/2009;     Social History     Social History     Marital status:      Spouse name: N/A     Number of children: N/A     Years of education: N/A     Occupational History     Not on file.     Social History Main Topics     Smoking status: Former Smoker     Smokeless tobacco: Not on file      Comment: quit 21yrs ago     Alcohol use Not on file     Drug use: Not on file     Sexual activity: Not Currently     Other Topics Concern     Not on file     Social History Narrative         ROS:  10 point review of systems otherwise positive for:  Seasonal allergies causing itchy eyes  Specifically patient denies any symptoms of recent cardiovascular issues, chest pain, chest pressure, shortness of breath, orthopnea, paroxysmal dyspnea, breathing concerns, or dramatic changes in exercise tolerance.  No current cough,  "fever, or URI symptoms.     No personal or family history of bleeding disorders or blood clots.  No personal or family history of anethesia reactions.    Current Outpatient Prescriptions   Medication Sig Dispense Refill     calcium-vitamin D (CALCIUM-VITAMIN D) 500 mg(1,250mg) -200 unit per tablet Take 1 tablet by mouth.       gabapentin (NEURONTIN) 600 MG tablet Take 600 mg by mouth daily.       ibuprofen (ADVIL,MOTRIN) 800 MG tablet Take 600 mg by mouth.       inFLIXimab (REMICADE) 100 mg injection Infuse into a venous catheter.       levothyroxine (SYNTHROID, LEVOTHROID) 50 MCG tablet TAKE 1 TABLET EVERY DAY 90 tablet 3     multivitamin capsule Take 1 capsule by mouth daily.       VITAMIN A ORAL Take by mouth.       No current facility-administered medications for this visit.        Allergies   Allergen Reactions     Sulfa (Sulfonamide Antibiotics)        EXAM:  /62 (Patient Site: Left Arm, Patient Position: Sitting, Cuff Size: Adult Regular)  Pulse 74  Temp 97.5  F (36.4  C) (Oral)   Resp 12  Ht 5' 4\" (1.626 m)  Wt 142 lb 8 oz (64.6 kg)  BMI 24.46 kg/m2  Constitutional:   Alert, NAD   Eyes: pupils equal and reactive to light, extraocular movements intact    ENT:  Nose: clear.  Throat:  clear without evidence of acute infection  Neck:   Supple, No significant adenopathy; no evidence of carotid bruit or thrill bilaterally  Respiratory:  Clear without wheeze, ronchi or crackles; no increased work of breathing.  Cardiac:   Normal S1, S2.  Regular rate and rhythm without murmur  Gastrointestinal:   Abdomen soft,  nontender , no hepatosplenomegaly or mass palpable.  Musculoskeletal:  No significant deformity.  Neurologic: no evidence of weakness of the upper or lower extremities  Skin:  No evidence of rash or jaundice    DIAGNOSTICS:  Labs performed include: none indicated  EKG demonstrates: not indicated    ASSESSMENT/ PLAN:  -Diagnosis of right eye cataract, planning to undergo removal; patient is " cleared for surgery at this time.    -No evidence of signficant cardiovascular risk that would prevent upcoming surgery.  -Rheumatoid arthritis. Managed by  Rheumatology. Last remicade infusion was in January, and patient not having another infusion until after cataract procedure. Her last CBC, BMP labs on 1/4/17 were normal.      The patient was asked the following questions to assess whether he/she was at increased cardiovascular risk for surgery; at this time the patient is able to meet the following demand without chest pain or shortness of breath:  walk up a flight of steps or a hill (4 METs)    Surgical risk:  This surgery is considered low risk involoving Ambulatory surgery, Endoscopy, Superficial procedure, Cataract surgery, Breast surgery    Amparo Hernandez MD  [facility]

## 2021-06-10 NOTE — TELEPHONE ENCOUNTER
Refill Approved    Rx renewed per Medication Renewal Policy. Medication was last renewed on 2/20/20.    Sydney Panchal, [facility] Triage/Med Refill 8/18/2020     Requested Prescriptions   Pending Prescriptions Disp Refills     levothyroxine (SYNTHROID, LEVOTHROID) 50 MCG tablet [Pharmacy Med Name: LEVOTHYROXINE SODIUM 50 MCG Tablet] 90 tablet 1     Sig: TAKE 1 TABLET EVERY DAY       Thyroid Hormones Protocol Passed - 8/17/2020 11:31 AM        Passed - Provider visit in past 12 months or next 3 months     Last office visit with prescriber/PCP: 3/18/2019 Lilliam Jones MD OR same dept: Visit date not found OR same specialty: 3/2/2020 Rosa Isela Connors MD  Last physical: 9/23/2019 Last MTM visit: Visit date not found   Next visit within 3 mo: Visit date not found  Next physical within 3 mo: Visit date not found  Prescriber OR PCP: Lilliam Jones MD  Last diagnosis associated with med order: 1. Hypothyroid  - levothyroxine (SYNTHROID, LEVOTHROID) 50 MCG tablet [Pharmacy Med Name: LEVOTHYROXINE SODIUM 50 MCG Tablet]; TAKE 1 TABLET EVERY DAY  Dispense: 90 tablet; Refill: 1    If protocol passes may refill for 12 months if within 3 months of last provider visit (or a total of 15 months).             Passed - TSH on file in past 12 months for patient age 12 & older     TSH   Date Value Ref Range Status   09/23/2019 2.64 0.30 - 5.00 uIU/mL Final

## 2021-06-13 NOTE — PROGRESS NOTES
Assessment and Plan:   Lesly Hutchinson is a 70 y.o. female presenting today for medicare wellness exam and chronic disease management.     1. Routine general medical examination at a health care facility  Reviewed health history and health maintenance recommendations.   Unable to locate colonoscopy report, IRIS signed for [facility] records.     2. Hypothyroidism, unspecified type  - Thyroid Cascade    Asymptomatic. Due for surveillance labs. Doesn't need refill today. Okay to refill when due.     3. Osteopenia with high risk of fracture  - Comprehensive Metabolic Panel  - Vitamin D, Total (25-Hydroxy)    Up to date on DEXA screening, recommended to follow up next year.  Is working on calcium, vitamin D, and weight bearing exercise to decrease risk of fracture.     4. Primary osteoarthritis involving multiple joints  - Comprehensive Metabolic Panel  - HM2(CBC w/o Differential)    Using NSAIDS for pain relief. Monitor kidney function and evaluate for anemia in setting of chronic NSAID use.     5. Advanced directives, counseling/discussion  Reviewed honoring choices documentation, recommended she discuss wishes with family members, may notarize and return form if desired.     6. Visit for screening mammogram  - Mammo Screening Bilateral; Future    7. Encounter for hepatitis C screening test for low risk patient  - Hepatitis C Antibody (Anti-HCV)    8. Lipid screening  - Lipid [place] FASTING     The patient's current medical problems were reviewed.    I have had an Advance Directives discussion with the patient.     The following health maintenance schedule was reviewed with the patient and provided in printed form in the after visit summary:   Health Maintenance   Topic Date Due     HEPATITIS C SCREENING  1950     MAMMOGRAM  02/19/2021     MEDICARE ANNUAL WELLNESS VISIT  12/04/2021     FALL RISK ASSESSMENT  12/04/2021     LIPID  01/24/2024     ADVANCE CARE PLANNING  01/24/2024     COLORECTAL CANCER SCREENING   09/26/2024     TD 18+ HE  01/24/2029     DEXA SCAN  10/11/2034     Pneumococcal Vaccine: 65+ Years  Completed     INFLUENZA VACCINE RULE BASED  Completed     ZOSTER VACCINES  Completed     Pneumococcal Vaccine: Pediatrics (0 to 5 Years) and At-Risk Patients (6 to 64 Years)  Aged Out     HEPATITIS B VACCINES  Aged Out        Subjective:   Chief Complaint: Lesly Hutchinson is an 70 y.o. female here for an Annual Wellness visit.     HPI:    HEALTH RISK ASSESSMENT:   - nutrition: eats a lot of sugar and carbs, daily salad, fruit, knows what to do  - hearing: has hearing aids, got them at [facility], with new insurance may be able to get new ones, aids are 4 years old  - urine: Sometimes has urge to go, thinks dehydrated, may leak after going to the bathroom    CHRONIC:   Gabapentin - Using for sleep at night. Was started by [facility]. Is effective. Doesn't need a refill of her meds today. Insurance is changing in January.     Rheumatology - possible RA? Weaned off Imuran. Didn't notice worsening off her Imuran.     Has significant OA. Right knee is the worst. Is taking ibuprofen regularly for this. Denies any stomach upset. Not taking daily. Hoping for knee replacement next summer.     Osteopenia - Is doing calcium and vitamin D supplementation. Exercising.     Thyroid - Asymptomatic     Colon - hx polyps, thinks due in 2024 (IRIS for report)    Review of Systems:  Please see above.  The rest of the review of systems are negative for all systems.    Patient Care Team:  Lilliam Jones MD as PCP - General (Family Medicine)  Lilliam Jones MD as Assigned PCP  Jack Summers, PharmD as Pharmacist (Pharmacist)     Patient Active Problem List   Diagnosis     Benign Adenomatous Polyp Of The Large Intestine     Menopause     Osteopenia with high risk of fracture     Hypothyroidism     Rheumatoid arthritis (H)     Primary osteoarthritis of both knees     No past medical history on file.   Past Surgical History:  "  Procedure Laterality Date     FINGER GANGLION CYST EXCISION Right 10/10/2016    mucinous cyst     HYSTERECTOMY  2002?     KNEE ARTHROSCOPY Left 04/2016     OOPHORECTOMY Bilateral 2002?     AK CORRJ HALLUX VALGUS W/SESMDC W/2 OSTEOT      Description: Hallux Valgus (Bunion) Correction;  Recorded: 09/01/2009;     AK DILATION/CURETTAGE,DIAGNOSTIC      Description: Dilation And Curettage;  Recorded: 09/01/2009;     AK OSTEOTOMY 1ST METATARSAL,BASE/SHAFT      Description: Metatarsal Osteotomy Of The First Metatarsal;  Recorded: 09/01/2009;      Family History   Problem Relation Age of Onset     Lung cancer Father      Throat cancer Maternal Uncle       Social History     Tobacco Use     Smoking status: Former Smoker     Smokeless tobacco: Former User     Tobacco comment: quit 21yrs ago   Substance Use Topics     Alcohol use: Not on file     Drug use: Not on file        Current Outpatient Medications   Medication Sig Dispense Refill     calcium-vitamin D (CALCIUM-VITAMIN D) 500 mg(1,250mg) -200 unit per tablet Take 1 tablet by mouth.       gabapentin (NEURONTIN) 600 MG tablet TAKE 1 TABLET (600 MG TOTAL) BY MOUTH AT BEDTIME. 90 tablet 3     levothyroxine (SYNTHROID, LEVOTHROID) 50 MCG tablet Take 1 tablet (50 mcg total) by mouth daily. 90 tablet 0     multivitamin capsule Take 1 capsule by mouth daily.       ibuprofen (ADVIL,MOTRIN) 600 MG tablet Take 600 mg by mouth every 6 (six) hours as needed for pain.       moxifloxacin (VIGAMOX) 0.5 % ophthalmic solution Start 1 day before surgery- Instill one drop in operative eye 4 times per day for one week following surgery, then stop.  1     No current facility-administered medications for this visit.       Objective:   Vital Signs:   Visit Vitals  /77   Pulse 78   Ht 5' 3.75\" (1.619 m)   Wt 158 lb 2 oz (71.7 kg)   BMI 27.36 kg/m         VisionScreening:  No exam data present     PHYSICAL EXAM  GENERAL: Lesly is a pleasant, well appearing female, in no acute " distress.  HEENT: Sclera white, no cervical lymphadenopathy, no thyromegaly.  HEART: Regular rate and rhythm, no murmurs.  LUNGS: Clear to auscultation bilaterally, unlabored.  ABDOMEN: Soft, nontender to palpation.  No palpable masses.  EXTREMITIES: Pulses intact, no lower extremity edema.  PSYCH: Mood is good, normal affect, appropriately groomed, normal thought content.    Assessment Results 12/4/2020   Activities of Daily Living No help needed   Instrumental Activities of Daily Living No help needed   Mini Cog Total Score 5   Some recent data might be hidden     A Mini-Cog score of 0-2 suggests the possibility of dementia, score of 3-5 suggests no dementia    Identified Health Risks:       She is at risk for lack of exercise and has been provided with information to increase physical activity for the benefit of her well-being.    The patient was counseled and encouraged to consider modifying their diet and eating habits. She was provided with information on recommended healthy diet options.    The patient was provided with written information regarding signs of hearing loss.    Information on urinary incontinence and treatment options given to patient.    Information regarding advance directives (living bravo), including where she can download the appropriate form, was provided to the patient via the AVS.

## 2021-06-13 NOTE — PROGRESS NOTES
Patient updated by Levar with lab results.  --------------------------  Emil Grace,  Your lab results have returned.   1. Your vitamin D level is actually above therapeutic range. It is probably okay heading into winter, but you should consider decreasing your dose in the spring and summer when you have more sun exposure.   2. Your total cholesterol is a touch high, but this is probably because your good cholesterol is so high. We do not need to do anything special about this. Keep working on eating a heart healthy diet.  The rest of your labs are normal.  Please reach out with any questions or concerns.  Darline Shane, DO

## 2021-06-13 NOTE — TELEPHONE ENCOUNTER
RN cannot approve Refill Request    RN can NOT refill this medication PCP messaged that patient is overdue for Office Visit. Last office visit: 3/18/2019 Lilliam Jones MD Last Physical: 9/23/2019 Last MTM visit: Visit date not found Last visit same specialty: 3/2/2020 Rosa Isela Connors MD.  Next visit within 3 mo: Visit date not found  Next physical within 3 mo: Visit date not found      Betsy Noguera, [facility] Triage/Med Refill 11/21/2020    Requested Prescriptions   Pending Prescriptions Disp Refills     levothyroxine (SYNTHROID, LEVOTHROID) 50 MCG tablet 90 tablet 0     Sig: Take 1 tablet (50 mcg total) by mouth daily.       Thyroid Hormones Protocol Failed - 11/20/2020  3:14 PM        Failed - Provider visit in past 12 months or next 3 months     Last office visit with prescriber/PCP: 3/18/2019 Lilliam Jones MD OR same dept: Visit date not found OR same specialty: 3/2/2020 Rosa Isela Connors MD  Last physical: 9/23/2019 Last MTM visit: Visit date not found   Next visit within 3 mo: Visit date not found  Next physical within 3 mo: Visit date not found  Prescriber OR PCP: Lilliam Jones MD  Last diagnosis associated with med order: 1. Hypothyroid  - levothyroxine (SYNTHROID, LEVOTHROID) 50 MCG tablet; Take 1 tablet (50 mcg total) by mouth daily.  Dispense: 90 tablet; Refill: 0    If protocol passes may refill for 12 months if within 3 months of last provider visit (or a total of 15 months).             Failed - TSH on file in past 12 months for patient age 12 & older     TSH   Date Value Ref Range Status   09/23/2019 2.64 0.30 - 5.00 uIU/mL Final

## 2021-06-13 NOTE — TELEPHONE ENCOUNTER
Requested Prescriptions     Pending Prescriptions Disp Refills     levothyroxine (SYNTHROID, LEVOTHROID) 50 MCG tablet 90 tablet 0     Sig: Take 1 tablet (50 mcg total) by mouth daily.     Last Office Visit:  3/2/2020  Last Refill:  unknown  CSA:  N/A  Date of Last Labs:  9/23/2019

## 2021-06-14 NOTE — TELEPHONE ENCOUNTER
Refill Approved    Rx renewed per Medication Renewal Policy. Medication was last renewed on 11/24/2020-levothyroxine, 04/30/2020-gabapentin.  Last office visit was 12/04/2020 with PCP office.    Janice Payan, [facility] Triage/Med Refill 1/18/2021     Requested Prescriptions   Pending Prescriptions Disp Refills     levothyroxine (SYNTHROID, LEVOTHROID) 50 MCG tablet 90 tablet 0     Sig: Take 1 tablet (50 mcg total) by mouth daily.       Thyroid Hormones Protocol Passed - 1/18/2021  5:16 PM        Passed - Provider visit in past 12 months or next 3 months     Last office visit with prescriber/PCP: 3/18/2019 Lilliam Jones MD OR same dept: Visit date not found OR same specialty: 3/2/2020 Rosa Isela Connors MD  Last physical: 9/23/2019 Last MTM visit: Visit date not found   Next visit within 3 mo: Visit date not found  Next physical within 3 mo: Visit date not found  Prescriber OR PCP: Lilliam Jones MD  Last diagnosis associated with med order: 1. Hypothyroid  - levothyroxine (SYNTHROID, LEVOTHROID) 50 MCG tablet; Take 1 tablet (50 mcg total) by mouth daily.  Dispense: 90 tablet; Refill: 0    2. Rheumatoid arthritis (H)  - gabapentin (NEURONTIN) 600 MG tablet; Take 1 tablet (600 mg total) by mouth at bedtime.  Dispense: 90 tablet; Refill: 3    If protocol passes may refill for 12 months if within 3 months of last provider visit (or a total of 15 months).             Passed - TSH on file in past 12 months for patient age 12 & older     TSH   Date Value Ref Range Status   12/04/2020 3.04 0.30 - 5.00 uIU/mL Final                      gabapentin (NEURONTIN) 600 MG tablet 90 tablet 3     Sig: Take 1 tablet (600 mg total) by mouth at bedtime.       Gabapentin/Levetiracetam/Tiagabine Refill Protocol  Passed - 1/18/2021  5:16 PM        Passed - PCP or prescribing provider visit in past 12 months or next 3 months     Last office visit with prescriber/PCP: 3/18/2019 Lilliam Jones MD OR same dept: Visit date not  found OR same specialty: 3/2/2020 Rosa Isela Connors MD  Last physical: 9/23/2019 Last MTM visit: Visit date not found   Next visit within 3 mo: Visit date not found  Next physical within 3 mo: Visit date not found  Prescriber OR PCP: Lilliam Jones MD  Last diagnosis associated with med order: 1. Hypothyroid  - levothyroxine (SYNTHROID, LEVOTHROID) 50 MCG tablet; Take 1 tablet (50 mcg total) by mouth daily.  Dispense: 90 tablet; Refill: 0    2. Rheumatoid arthritis (H)  - gabapentin (NEURONTIN) 600 MG tablet; Take 1 tablet (600 mg total) by mouth at bedtime.  Dispense: 90 tablet; Refill: 3    If protocol passes may refill for 12 months if within 3 months of last provider visit (or a total of 15 months).

## 2021-06-14 NOTE — TELEPHONE ENCOUNTER
Left detailed message that rx was sent to new pharmacy.  Advised to call back if further assistance needed.

## 2021-06-14 NOTE — TELEPHONE ENCOUNTER
Reason for Call:  Other med request    Detailed comments:pt has new insurance which is [employer], she called them re rx  And was told to call here nad request all of her rx.  For mail in- rx # [phone]  Fax [phone]    Phone Number Patient can be reached at: Cell number on file:    Telephone Information:   Mobile [phone]       Best Time:     Can we leave a detailed message on this number?: No call back needed    Call taken on 1/18/2021 at 1:49 PM by Jaleesa Herrera

## 2021-06-15 NOTE — PROGRESS NOTES
"Assessment & Plan:  1. Rheumatoid arthritis  Patient with history of rheumatoid arthritis.  She recently had to change rheumatologist and they are changing her treatment.  He states she feels more aching pain change in her treatment.  I recommend she follow-up with rheumatologist patient has also had a weight gain.  I think it is due to inactivity because of increased pain    2. Hypothyroidism  Patient with history of hypothyroidism on levothyroxine.  Due for labs today  - Thyroid Williams    3. Healthcare maintenance  Patient is due for cholesterol screening.  I reviewed her labs and care everywhere and all other blood work is up-to-date  - Lipid Cascade    4. Hearing loss  Patient with some buzzing in her ear.  I think this is related to her hearing loss.  Patient to monitor symptoms get worse she should come      Orders Placed This Encounter   Procedures     Thyroid Cascade     Lipid Cascade     Order Specific Question:   Fasting is required?     Answer:   No     There are no discontinued medications.     Patient Instructions   Recommend staying active- walking is great! Also consider using a stationary bike.     Goal: 30 minutes of activity three times per week.     Try keeping a diet diary.     Try using a humidifier in your bedroom.     No Follow-up on file.    The following high BMI interventions were performed this visit: encouragement to exercise    Chief Complaint:   Chief Complaint   Patient presents with     Medication Management     Ear Problem     sounded like \"crickets/buzzing\" sound in ear       History of Present Illness:  Lesly is a 67 y.o. female presenting to the clinic today for medication management.     Ear Buzzing: Last night she fell asleep while watching TV and when she woke and turned the TV off she experienced a high pitched buzzing in her ears. She was not wearing her hearing aids at the time. She has not noticed it through much of the winter yet, but when she does it is typically when she " is sitting quietly with the TV on. She has had hearing aids for about 1-2 years. She believes the ringing in her ears began prior to her getting hearing aids. She does not see audiology to follow up on her hearing aids.     Weight Gain: She has gained 8 lbs in the last year and states that she has been constantly gaining. She has not been doing anything differently diet-wise or activity wise. She has not been actively trying to lose weight, but not gain either. She has noticed her clothes are fitting differently and her hands have become more swollen. She does not do much for activity. She has an hour for lunch and could make time then to be active.     Arthritis: She had to switch her rheumatologist last year due to insurance coverage. Her Remicade was reduced and they are trying to slowly taper her off. Her Imuran was discontinued as well. Her hands have become more swollen, she can tell by how her rings fit. She does not know if her symptoms have worsened with the medication changes. She states that it is difficult to tell with weather changes and osteoarthritis.    HM: She has already received the influenza vaccine this season. She cannot receive Zoster as she is on Remicade.       Review of Systems:  She does not need any medication refills today. She sees an eye doctor annually. She has had some post nasal drip and her nose has felt very dry. She has been applying Vaseline.   All other systems are negative.     PFSH:  Social: She has a Yorkie that is slowly losing eye sight.     Tobacco Use:  History   Smoking Status     Former Smoker   Smokeless Tobacco     Not on file     Comment: quit 21yrs ago       Vitals:  Vitals:    01/08/18 1515   BP: 118/62   Patient Site: Right Arm   Patient Position: Sitting   Cuff Size: Adult Regular   Pulse: 72   Resp: 16   Temp: 97.5  F (36.4  C)   TempSrc: Oral   Weight: 151 lb 8 oz (68.7 kg)     Wt Readings from Last 3 Encounters:   01/08/18 151 lb 8 oz (68.7 kg)   03/27/17 142  lb 8 oz (64.6 kg)   04/07/16 142 lb (64.4 kg)     Body mass index is 26 kg/(m^2).    Physical Exam:  Constitutional:  Reveals an alert, cooperative, very talkative, pleasant female in no acute distress.  Vitals:  Per nursing notes.  Ears:  Clear.  Oropharynx:  Without posterior nasal drainage or thrush. Some post-nasal drip.   Neck:  Supple, no lymphadenopathy,  thyroid not palpable.  Cardiac:  Regular rate and rhythm without murmurs, rubs, or gallops. Carotids without bruits.  Lungs: Clear.  Respiratory effort normal.  Rheumatologic: Normal joints and nails of the hands.  Neurologic:  No gross focal deficits.    Psychiatric:  Mood appropriate, memory intact.     Data Reviewed:  Additional history summarized (from old records or history from someone other than the patient or another healthcare provider) (2 TOTAL): Reviewed rheumatology note from 1/24/17; arthritis, Remicade, tapering doses.    Decision to obtain extra information (old records requested or history from another person or accessing Care Everywhere) (1 TOTAL): Accessed Care Everywhere today.     Radiology tests summarized or ordered (XRAY/CT/MRI/DXA) (1 TOTAL): None.    Labs reviewed or ordered (1 TOTAL): Reviewed labs on Care Everywhere from 12/19/17; endocrine, chemistry, heme, immunology. Ordered labs today.     Medicine tests summarized or ordered (EKG/ECHO) (1 TOTAL): None.    Independent review of EKG or X-Ray (2 EACH): None.       The visit lasted a total of 31 minutes face to face with the patient. Over 50% of the time was spent counseling and educating the patient about weight gain/loss.    I, Rocio Romero, am scribing for and in the presence of, Dr. Chester.    I, Dinorah Chester MD, personally performed the services described in this documentation, as scribed by Rocio Romero in my presence, and it is both accurate and complete.    Medications:  Current Outpatient Prescriptions   Medication Sig Dispense Refill     calcium-vitamin D  (CALCIUM-VITAMIN D) 500 mg(1,250mg) -200 unit per tablet Take 1 tablet by mouth.       gabapentin (NEURONTIN) 600 MG tablet Take 600 mg by mouth daily.       ibuprofen (ADVIL,MOTRIN) 600 MG tablet Take 600 mg by mouth every 6 (six) hours as needed for pain.       inFLIXimab (REMICADE) 100 mg injection Infuse into a venous catheter.       levothyroxine (SYNTHROID, LEVOTHROID) 50 MCG tablet TAKE 1 TABLET EVERY DAY 90 tablet 0     multivitamin capsule Take 1 capsule by mouth daily.       VITAMIN A ORAL Take by mouth.       ibuprofen (ADVIL,MOTRIN) 800 MG tablet Take 600 mg by mouth.       No current facility-administered medications for this visit.        Total Data Points: 4

## 2021-06-16 PROBLEM — M17.0 PRIMARY OSTEOARTHRITIS OF BOTH KNEES: Status: ACTIVE | Noted: 2019-01-25

## 2021-06-16 PROBLEM — M15.0 PRIMARY OSTEOARTHRITIS INVOLVING MULTIPLE JOINTS: Status: ACTIVE | Noted: 2018-10-10

## 2021-06-17 NOTE — PATIENT INSTRUCTIONS - HE
Patient Instructions by Dinorah Chester MD at 1/24/2019  3:30 PM     Author: Dinorah Chester MD Service: -- Author Type: Physician    Filed: 1/24/2019  4:22 PM Encounter Date: 1/24/2019 Status: Addendum    : Dinorah Chester MD (Physician)    Related Notes: Original Note by Dinorah Chester MD (Physician) filed at 1/24/2019  4:13 PM         Patient Education     Exercise for a Healthier Heart  You may wonder how you can improve the health of your heart. If youre thinking about exercise, youre on the right track. You dont need to become an athlete, but you do need a certain amount of brisk exercise to help strengthen your heart. If you have been diagnosed with a heart condition, your doctor may recommend exercise to help stabilize your condition. To help make exercise a habit, choose safe, fun activities.       Be sure to check with your health care provider before starting an exercise program.    Why exercise?  Exercising regularly offers many healthy rewards. It can help you do all of the following:    Improve your blood cholesterol levels to help prevent further heart trouble    Lower your blood pressure to help prevent a stroke or heart attack    Control diabetes, or reduce your risk of getting this disease    Improve your heart and lung function    Reach and maintain a healthy weight    Make your muscles stronger and more limber so you can stay active    Prevent falls and fractures by slowing the loss of bone mass (osteoporosis)    Manage stress better  Exercise tips  Ease into your routine. Set small goals. Then build on them.  Exercise on most days. Aim for a total of 150 or more minutes of moderate to  vigorous intensity activity each week. Consider 40 minutes, 3 to 4 times a week. For best results, activity should last for 40 minutes on average. It is OK to work up to the 40 minute period over time. Examples of moderate-intensity activity is walking one mile in 15 minutes or 30 to 45 minutes of yard  work.  Step up your daily activity level. Along with your exercise program, try being more active throughout the day. Walk instead of drive. Do more household tasks or yard work.  Choose one or more activities you enjoy. Walking is one of the easiest things you can do. You can also try swimming, riding a bike, or taking an exercise class.  Stop exercising and call your doctor if you:    Have chest pain or feel dizzy or lightheaded    Feel burning, tightness, pressure, or heaviness in your chest, neck, shoulders, back, or arms    Have unusual shortness of breath    Have increased joint or muscle pain    Have palpitations or an irregular heartbeat      1768-1281 The [employer]. [address] [place] [address], [place] [address]. All rights reserved. This information is not intended as a substitute for professional medical care. Always follow your healthcare professional's instructions.         Patient Education   Understanding [facility] MyPlate  The USDA (US Department of Agriculture) has guidelines to help you make healthy food choices. These are called MyPlate. MyPlate shows the food groups that make up healthy meals using the image of a place setting. Before you eat, think about the healthiest choices for what to put onto your plate or into your cup or bowl. To learn more about building a healthy plate, visit www.choosemyplate.gov.       The Food Groups    Fruits: Any fruit or 100% fruit juice counts as part of the Fruit Group. Fruits may be fresh, canned, frozen, or dried, and may be whole, cut-up, or pureed. Make half your plate fruits and vegetables.    Vegetables: Any vegetable or 100% vegetable juice counts as a member of the Vegetable Group. Vegetables may be fresh, frozen, canned, or dried. They can be served raw or cooked and may be whole, cut-up, or mashed. Make half your plate fruits and vegetables.     Grains: All foods made from grains are part of the Grains Group. These include wheat, rice, oats,  cornmeal, and barley such as bread, pasta, oatmeal, cereal, tortillas, and grits. Grains should be no more than a quarter of your plate. At least half of your grains should be whole grains.    Protein: This group includes meat, poultry, seafood, beans and peas, eggs, processed soy products (like tofu), nuts (including nut butters), and seeds. Make protein choices no more than a quarter of your plate. Meat and poultry choices should be lean or low fat.    Dairy: All fluid milk products and foods made from milk that contain calcium, like yogurt and cheese are part of the Dairy Group. (Foods that have little calcium, such as cream, butter, and cream cheese, are not part of the group.) Most dairy choices should be low-fat or fat-free.    Oils: These are fats that are liquid at room temperature. They include canola, corn, olive, soybean, and sunflower oil. Foods that are mainly oil include mayonnaise, certain salad dressings, and soft margarines. You should have only 5 to 7 teaspoons of oils a day. You probably already get this much from the food you eat.  Use [employer] to Help Build Your Meals  The SuperTracker can help you plan and track your meals and activity. You can look up individual foods to see or compare their nutritional value. You can get guidelines for what and how much you should eat. You can compare your food choices. And you can assess personal physical activities and see ways you can improve. Go to www.choosemyplate.gov/supertracker/.    0322-4899 The [employer]. [address] [place] [address], [place] [address]. All rights reserved. This information is not intended as a substitute for professional medical care. Always follow your healthcare professional's instructions.           Patient Education   Understanding Advance Care Planning  Advance care planning is the process of deciding ones own future medical care. It helps ensure that if you cant speak for yourself, your wishes can still be carried  out. The plan is a series of legal documents that note a persons wishes. The documents vary by state. Advance care planning may be done when a person has a serious illness that is expected to get worse. It may be done before major surgery. And it can help you and your family be prepared in case of a major illness or injury. Advance care planning helps with making decisions at these times.       A health care proxy is a person who acts as the voice of a patient when the patient cant speak for himself or herself. The name of this role varies by state. It may be called a Durable Medical Power of  or Durable Power of  for Healthcare. It may be called an agent, surrogate, or advocate. Or it may be called a representative or decision maker. It is an official duty that is identified by a legal document. The document also varies by state.    Why Is Advance Care Planning Important?  If a person communicates their healthcare wishes:    They will be given medical care that matches their values and goals.    Their family members will not be forced to make decisions in a crisis with no guidance.  Creating a Plan  Making an advance care plan is often done in 3 steps:    Thinking about ones wishes. To create an advance care plan, you should think about what kind of medical treatment you would want if you lose the ability to communicate. Are there any situations in which you would refuse or stop treatment? Are there therapies you would want or not want? And whom do you want to make decisions for you? There are many places to learn more about how to plan for your care. Ask your doctor or  for resources.    Picking a health care proxy. This means choosing a trusted person to speak for you only when you cant speak for yourself. When you cannot make medical decisions, your proxy makes sure the instructions in your advance care plan are followed. A proxy does not make decisions based on his or her own  opinions. They must put aside those opinions and values if needed, and carry out your wishes.    Filling out the legal documents. There are several kinds of legal documents for advance care planning. Each one tells health care providers your wishes. The documents may vary by state. They must be signed and may need to be witnessed or notarized. You can cancel or change them whenever you wish. Depending on your state, the documents may include a Healthcare Proxy form, Living Will, Durable Medical Power of , Advance Directive, or others.  The Familys Role  The best help a family can give is to support their loved ones wishes. Open and honest communication is vital. Family should express any concerns they have about the patients choices while the patient can still make decisions.    5788-6191 The [employer]. [address] [place] [address]. All rights reserved. This information is not intended as a substitute for professional medical care. Always follow your healthcare professional's instructions.         Also, [facility] offers a free, downloadable health care directive that allows you to share your treatment choices and personal preferences if you cannot communicate your wishes. It also allows you to appoint another person (called a health care agent) to make health care decisions if you are unable to do so. You can download an advance directive by going here: [email or web address]     Patient Education   Personalized Prevention Plan  You are due for the preventive services outlined below.  Your care team is available to assist you in scheduling these services.  If you have already completed any of these items, please share that information with your care team to update in your medical record.  Health Maintenance   Topic Date Due   ? ZOSTER VACCINES (1 of 2) 03/06/1999   ? DXA SCAN  10/16/2014   ? MAMMOGRAM  09/02/2018   ? FALL RISK ASSESSMENT   01/08/2019   ? TD 18+ HE  09/01/2019   ? ADVANCE DIRECTIVES DISCUSSED WITH PATIENT  07/23/2020   ? COLONOSCOPY  09/26/2024   ? PNEUMOCOCCAL POLYSACCHARIDE VACCINE AGE 65 AND OVER  Completed   ? INFLUENZA VACCINE RULE BASED  Completed   ? PNEUMOCOCCAL CONJUGATE VACCINE FOR ADULTS (PCV13 OR PREVNAR)  Completed           Second opinion - for knee - Dr Campo - [facility] vaccine - check insurance to see where to get shot - either pharmacy or clinic.   If part D - pharmacy    Consider Vit B complex vitamin ( stress complex)

## 2021-06-18 NOTE — PATIENT INSTRUCTIONS - HE
Patient Instructions by Darline Shane DO at 12/4/2020 10:20 AM     Author: Darline Shane DO Service: -- Author Type: Physician    Filed: 12/4/2020 10:33 AM Encounter Date: 12/4/2020 Status: Signed    : Darline Shane DO (Physician)         Patient Education     Exercise for a Healthier Heart  You may wonder how you can improve the health of your heart. If youre thinking about exercise, youre on the right track. You dont need to become an athlete, but you do need a certain amount of brisk exercise to help strengthen your heart. If you have been diagnosed with a heart condition, your doctor may recommend exercise to help stabilize your condition. To help make exercise a habit, choose safe, fun activities.       Be sure to check with your health care provider before starting an exercise program.    Why exercise?  Exercising regularly offers many healthy rewards. It can help you do all of the following:    Improve your blood cholesterol levels to help prevent further heart trouble    Lower your blood pressure to help prevent a stroke or heart attack    Control diabetes, or reduce your risk of getting this disease    Improve your heart and lung function    Reach and maintain a healthy weight    Make your muscles stronger and more limber so you can stay active    Prevent falls and fractures by slowing the loss of bone mass (osteoporosis)    Manage stress better  Exercise tips  Ease into your routine. Set small goals. Then build on them.  Exercise on most days. Aim for a total of 150 or more minutes of moderate to  vigorous intensity activity each week. Consider 40 minutes, 3 to 4 times a week. For best results, activity should last for 40 minutes on average. It is OK to work up to the 40 minute period over time. Examples of moderate-intensity activity is walking one mile in 15 minutes or 30 to 45 minutes of yard work.  Step up your daily activity level. Along with your exercise program, try being more active  throughout the day. Walk instead of drive. Do more household tasks or yard work.  Choose one or more activities you enjoy. Walking is one of the easiest things you can do. You can also try swimming, riding a bike, or taking an exercise class.  Stop exercising and call your doctor if you:    Have chest pain or feel dizzy or lightheaded    Feel burning, tightness, pressure, or heaviness in your chest, neck, shoulders, back, or arms    Have unusual shortness of breath    Have increased joint or muscle pain    Have palpitations or an irregular heartbeat      7162-2241 [employer]. [address] [place] [address]. All rights reserved. This information is not intended as a substitute for professional medical care. Always follow your healthcare professional's instructions.         Patient Education   Understanding SMAT[employer] MyPlate  The USDA (US Department of Agriculture) has guidelines to help you make healthy food choices. These are called MyPlate. MyPlate shows the food groups that make up healthy meals using the image of a place setting. Before you eat, think about the healthiest choices for what to put onto your plate or into your cup or bowl. To learn more about building a healthy plate, visit www.choosemyplate.gov.       The Food Groups    Fruits: Any fruit or 100% fruit juice counts as part of the Fruit Group. Fruits may be fresh, canned, frozen, or dried, and may be whole, cut-up, or pureed. Make half your plate fruits and vegetables.    Vegetables: Any vegetable or 100% vegetable juice counts as a member of the Vegetable Group. Vegetables may be fresh, frozen, canned, or dried. They can be served raw or cooked and may be whole, cut-up, or mashed. Make half your plate fruits and vegetables.     Grains: All foods made from grains are part of the Grains Group. These include wheat, rice, oats, cornmeal, and barley such as bread, pasta, oatmeal, cereal, tortillas, and grits. Grains should be no more  than a quarter of your plate. At least half of your grains should be whole grains.    Protein: This group includes meat, poultry, seafood, beans and peas, eggs, processed soy products (like tofu), nuts (including nut butters), and seeds. Make protein choices no more than a quarter of your plate. Meat and poultry choices should be lean or low fat.    Dairy: All fluid milk products and foods made from milk that contain calcium, like yogurt and cheese are part of the Dairy Group. (Foods that have little calcium, such as cream, butter, and cream cheese, are not part of the group.) Most dairy choices should be low-fat or fat-free.    Oils: These are fats that are liquid at room temperature. They include canola, corn, olive, soybean, and sunflower oil. Foods that are mainly oil include mayonnaise, certain salad dressings, and soft margarines. You should have only 5 to 7 teaspoons of oils a day. You probably already get this much from the food you eat.  Use [employer] to Help Build Your Meals  The [employer] can help you plan and track your meals and activity. You can look up individual foods to see or compare their nutritional value. You can get guidelines for what and how much you should eat. You can compare your food choices. And you can assess personal physical activities and see ways you can improve. Go to www.MotorExchange.gov/supertracker/.    5610-6842 [employer]. [address] [place] [address]. All rights reserved. This information is not intended as a substitute for professional medical care. Always follow your healthcare professional's instructions.           Patient Education   Signs of Hearing Loss  Hearing loss is a problem shared by many people. In fact, it is one of the most common health conditions, particularly as people age. Most people over age 65 have some hearing loss, and by age 80, almost everyone does. Because hearing loss usually occurs slowly over the years, you may  not realize your hearing ability has gotten worse.       Have your hearing checked  Contact your [facility] care provider if you:    Have to strain to hear normal conversation.    Have to watch other peoples faces very carefully to follow what theyre saying.    Need to ask people to repeat what theyve said.    Often misunderstand what people are saying.    Turn the volume of the television or radio up so high that others complain.    Feel that people are mumbling when theyre talking to you.    Find that the effort to hear leaves you feeling tired and irritated.    Notice, when using the phone, that you hear better with 1 ear than the other.    8014-8476 [employer]. [address] [place] [address], [place] [address]. All rights reserved. This information is not intended as a substitute for professional medical care. Always follow your healthcare professional's instructions.         Patient Education   Urinary Incontinence, Female (Adult)  Urinary incontinence means loss of control of the bladder. This problem affects many women, especially as they get older. If you have incontinence, you may be embarrassed to ask for help. But know that this problem can be treated.  Types of Incontinence  There are different types of incontinence. Two of the main types are described here. You can have more than one type.    Stress incontinence. With this type, urine leaks when pressure (stress) is put on the bladder. This may happen when you cough, sneeze, or laugh. Stress incontinence most often occurs because the pelvic floor muscles that support the bladder and urethra are weak. This can happen after pregnancy and vaginal childbirth or a hysterectomy. It can also be due to excess body weight or hormone changes.    Urge incontinence (also called overactive bladder). With this type, a sudden urge to urinate is felt often. This may happen even though there may not be much urine in the bladder. The need to urinate often during the  night is common. Urge incontinence most often occurs because of bladder spasms. This may be due to bladder irritation or infection. Damage to bladder nerves or pelvic muscles, constipation, and certain medicines can also lead to urge incontinence.  Treatment of urinary incontinence depends on the cause. Further evaluation is needed to find the type you have. This will likely include an exam and certain tests. Based on the results, you and your healthcare provider can then plan treatment. Until a diagnosis is made, the home care tips below can help relieve symptoms.  Home care    Do pelvic floor muscle exercises, if they are prescribed. The pelvic floor muscles help support the bladder and urethra. Many women find that their symptoms improve when doing special exercises that strengthen these muscles. To do the exercises contract the muscles you would use to stop your stream of urine, but do this when youre not urinating. Hold for 10 seconds, then relax. Repeat 10 to 20 times in a row, at least 3 times a day. Your provider may give you other instructions for how to do the exercises and how often.    Keep a bladder diary. This helps track how often and how much you urinate over a set period of time. Bring this diary with you to your next visit with the provider. The information can help your provider learn more about your bladder problem.    Lose weight, if advised to by your provider. Excess weight puts pressure on the bladder. Your provider can help you create a weight-loss plan thats right for you. This may include exercising more and making certain diet changes.    Don't consume foods and drinks that may irritate the bladder. These can include alcohol and caffeinated drinks.    Quit smoking. Smoking and other tobacco use can lead to chronic cough that strains the pelvic floor muscles. Smoking may also damage the bladder and urethra. Talk with your provider about treatments or methods you can use to quit  smoking.    If drinking large amounts of fluid causes you to have symptoms, you may be advised to limit your fluid intake. You may also be advised to drink most of your fluids during the day and to limit fluids at night.    If youre worried about urine leakage or accidents, you may wear absorbent pads to catch urine. Change the pads often. This helps reduce discomfort. It may also reduce the risk of skin or bladder infections.  Follow-up care  Follow up with your healthcare provider, or as directed. It may take some to find the right treatment for your problem. Your treatment plan may include special therapies or medicines. Certain procedures or surgery may also be options. Be sure to discuss any questions you have with your provider.  When to seek medical advice  Call the healthcare provider right away if any of these occur:    Fever of 100.4 F (38 C) or higher, or as directed by your provider    Bladder pain or fullness    Abdominal swelling    Nausea or vomiting    Back pain    Weakness, dizziness or fainting  Date Last Reviewed: 10/1/2017    9911-5467 [employer]. [address] [place] [address]. All rights reserved. This information is not intended as a substitute for professional medical care. Always follow your healthcare professional's instructions.     Patient Education   Understanding Advance Care Planning  Advance care planning is the process of deciding ones own future medical care. It helps ensure that if you cant speak for yourself, your wishes can still be carried out. The plan is a series of legal documents that note a persons wishes. The documents vary by state. Advance care planning may be done when a person has a serious illness that is expected to get worse. It may be done before major surgery. And it can help you and your family be prepared in case of a major illness or injury. Advance care planning helps with making decisions at these times.       A health care proxy is  a person who acts as the voice of a patient when the patient cant speak for himself or herself. The name of this role varies by state. It may be called a Durable Medical Power of  or Durable Power of  for Healthcare. It may be called an agent, surrogate, or advocate. Or it may be called a representative or decision maker. It is an official duty that is identified by a legal document. The document also varies by state.    Why Is Advance Care Planning Important?  If a person communicates their healthcare wishes:    They will be given medical care that matches their values and goals.    Their family members will not be forced to make decisions in a crisis with no guidance.  Creating a Plan  Making an advance care plan is often done in 3 steps:    Thinking about ones wishes. To create an advance care plan, you should think about what kind of medical treatment you would want if you lose the ability to communicate. Are there any situations in which you would refuse or stop treatment? Are there therapies you would want or not want? And whom do you want to make decisions for you? There are many places to learn more about how to plan for your care. Ask your doctor or  for resources.    Picking a health care proxy. This means choosing a trusted person to speak for you only when you cant speak for yourself. When you cannot make medical decisions, your proxy makes sure the instructions in your advance care plan are followed. A proxy does not make decisions based on his or her own opinions. They must put aside those opinions and values if needed, and carry out your wishes.    Filling out the legal documents. There are several kinds of legal documents for advance care planning. Each one tells health care providers your wishes. The documents may vary by state. They must be signed and may need to be witnessed or notarized. You can cancel or change them whenever you wish. Depending on your state, the  documents may include a Healthcare Proxy form, Living Will, Durable Medical Power of , Advance Directive, or others.  The Familys Role  The best help a family can give is to support their loved ones wishes. Open and honest communication is vital. Family should express any concerns they have about the patients choices while the patient can still make decisions.    2368-0138 [employer]. [address] [place] [address]. All rights reserved. This information is not intended as a substitute for professional medical care. Always follow your healthcare professional's instructions.         Also, [facility] [employer] offers a free, downloadable health care directive that allows you to share your treatment choices and personal preferences if you cannot communicate your wishes. It also allows you to appoint another person (called a health care agent) to make health care decisions if you are unable to do so. You can download an advance directive by going here: [email or web address]     Patient Education   Personalized Prevention Plan  You are due for the preventive services outlined below.  Your care team is available to assist you in scheduling these services.  If you have already completed any of these items, please share that information with your care team to update in your medical record.  Health Maintenance   Topic Date Due   ? HEPATITIS C SCREENING  1950   ? MAMMOGRAM  02/19/2021   ? MEDICARE ANNUAL WELLNESS VISIT  12/04/2021   ? FALL RISK ASSESSMENT  12/04/2021   ? LIPID  01/24/2024   ? ADVANCE CARE PLANNING  01/24/2024   ? COLORECTAL CANCER SCREENING  09/26/2024   ? TD 18+ HE  01/24/2029   ? DEXA SCAN  10/11/2034   ? Pneumococcal Vaccine: 65+ Years  Completed   ? INFLUENZA VACCINE RULE BASED  Completed   ? ZOSTER VACCINES  Completed   ? Pneumococcal Vaccine: Pediatrics (0 to 5 Years) and At-Risk Patients (6 to 64 Years)  Aged Out   ? HEPATITIS B  VACCINES  Aged Out

## 2021-06-20 NOTE — LETTER
Letter by Lilliam Jones MD at      Author: Lilliam Jones MD Service: -- Author Type: --    Filed:  Encounter Date: 4/13/2020 Status: (Other)         April 13, 2020     Patient: Lesly Hutchinson   YOB: 1950   Date of Visit: 4/13/2020       To Whom It May Concern:    It is my medical opinion that Lesly Hutchinson is at high risk for complications were she to contract COVID19. I'm asking that her employer helps minimize her risk by reducing her exposure to other workers (this could include but not limited to working from home, etc.).      If you have any questions or concerns, please don't hesitate to call.    Sincerely,        Electronically signed by Lilliam Jones MD

## 2021-06-23 NOTE — PROGRESS NOTES
Assessment and Plan:       1. Routine general medical examination at a health care facility  Immunizations reviewed and updated .  Alcohol use, safety and moderation discussed.  Recommended adequate calcium intake/osteoporosis prevention.  Discussed colon cancer screening at age 50, 45 if -American.  Diet and exercise reviewed, including goal of aerobic exercise 30-90 minutes most days of the week, moderation of portions sizes, avoiding eating out and fast food and increase in fruits and vegetables.  Discussed sun protection.  Discussed weight management.    - Lipid [place]    2. Visit for screening mammogram  Patient due for mammogram will order today  - Mammo Screening Bilateral; Future    3. Hypothyroidism, unspecified type  Patient on levothyroxine.  She has been stable for many years.  Will check a TSH and to continue the levothyroxine  - Thyroid Cascade    4. Primary osteoarthritis of both knees  Patient seeing a new rheumatologist.  They have changed her diagnosis from rheumatoid arthritis to osteoarthritis.  As a result she is stopped multiple medications.  She has not noticed a significant worsening of her symptoms.  She does have follow-up with rheumatology    The patient's current medical problems were reviewed.    I have had an Advance Directives discussion with the patient.  The following high BMI interventions were performed this visit: encouragement to exercise and lifestyle education regarding diet  The following health maintenance schedule was reviewed with the patient and provided in printed form in the after visit summary:   Health Maintenance   Topic Date Due     ZOSTER VACCINES (1 of 2) 03/06/1999     DXA SCAN  10/16/2014     MAMMOGRAM  09/02/2018     FALL RISK ASSESSMENT  01/24/2020     ADVANCE DIRECTIVES DISCUSSED WITH PATIENT  07/23/2020     COLONOSCOPY  09/26/2024     TD 18+ HE  01/24/2029     PNEUMOCOCCAL POLYSACCHARIDE VACCINE AGE 65 AND OVER  Completed     INFLUENZA VACCINE RULE  BASED  Completed     PNEUMOCOCCAL CONJUGATE VACCINE FOR ADULTS (PCV13 OR PREVNAR)  Completed        Subjective:   Chief Complaint: Lesly Hutchinson is an 68 y.o. female here for an Annual Wellness visit.     Chief Complaint: Lesly Hutchinson is an 68 y.o. female here for an Annual Wellness visit.   HPI: Patient is here for her annual exam.  Patient states overall she is doing well but feels she may have a bit of a cold.  Patient states no fevers chills sweats but a bit of a runny nose.  Patient states she is seeing a new rheumatologist through [facility].  This rheumatologist does not believe she has rheumatoid arthritis but actually osteoarthritis.  He is taking her off any DM CHEYANNE medications.  He is recommending her to use diclofenac gel as well as activity to treat her joint pain.  Patient states she is active in the summer and walks but she does have osteoarthritis of her knee and her feet which makes it difficult.  She is not considered pool therapy and currently has not considered a stationary bike.    Patient has a history of hypothyroidism and is on the vaccine.  She is due for labs today.  Patient also due for tetanus vaccine and we discussed potential for the shingles vaccine      Review of Systems:    Please see above.  The rest of the review of systems are negative for all systems.    Patient Care Team:  Dinorah Chester MD as PCP - General     Patient Active Problem List   Diagnosis     Benign Adenomatous Polyp Of The Large Intestine     Menopause     Osteoporosis     Hypothyroidism     Rheumatoid arthritis (H)     Primary osteoarthritis of both knees     No past medical history on file.   Past Surgical History:   Procedure Laterality Date     FINGER GANGLION CYST EXCISION Right 10/10/2016    mucinous cyst     HYSTERECTOMY  2002?     KNEE ARTHROSCOPY Left 04/2016     OOPHORECTOMY Bilateral 2002?     UT CORRJ HALLUX VALGUS W/SESMDC W/2 OSTEOT      Description: Hallux Valgus (Bunion) Correction;  Recorded:  09/01/2009;     DE DILATION/CURETTAGE,DIAGNOSTIC      Description: Dilation And Curettage;  Recorded: 09/01/2009;     DE OSTEOTOMY 1ST METATARSAL,BASE/SHAFT      Description: Metatarsal Osteotomy Of The First Metatarsal;  Recorded: 09/01/2009;      Family History   Problem Relation Age of Onset     Lung cancer Father      Throat cancer Maternal Uncle       Social History     Socioeconomic History     Marital status:      Spouse name: Not on file     Number of children: Not on file     Years of education: Not on file     Highest education level: Not on file   Social Needs     Financial resource strain: Not on file     Food insecurity - worry: Not on file     Food insecurity - inability: Not on file     Transportation needs - medical: Not on file     Transportation needs - non-medical: Not on file   Occupational History     Not on file   Tobacco Use     Smoking status: Former Smoker     Smokeless tobacco: Former User     Tobacco comment: quit 21yrs ago   Substance and Sexual Activity     Alcohol use: Not on file     Drug use: Not on file     Sexual activity: Not Currently   Other Topics Concern     Not on file   Social History Narrative     Not on file      Current Outpatient Medications   Medication Sig Dispense Refill     calcium-vitamin D (CALCIUM-VITAMIN D) 500 mg(1,250mg) -200 unit per tablet Take 1 tablet by mouth.       gabapentin (NEURONTIN) 600 MG tablet Take 1 tablet (600 mg total) by mouth at bedtime. 90 tablet 3     ibuprofen (ADVIL,MOTRIN) 600 MG tablet Take 600 mg by mouth every 6 (six) hours as needed for pain.       ibuprofen (ADVIL,MOTRIN) 800 MG tablet Take 600 mg by mouth.       levothyroxine (SYNTHROID, LEVOTHROID) 50 MCG tablet TAKE 1 TABLET EVERY DAY (NEED OFFICE VISIT FOR FUTURE REFILLS). 90 tablet 0     multivitamin capsule Take 1 capsule by mouth daily.       No current facility-administered medications for this visit.       Objective:   Vital Signs:   Visit Vitals  /85 (Patient  "Site: Left Arm, Patient Position: Sitting, Cuff Size: Adult Regular)   Pulse 80   Temp 96.6  F (35.9  C) (Oral)   Resp 16   Ht 5' 4.13\" (1.629 m)   Wt 150 lb (68 kg)   SpO2 97%   BMI 25.64 kg/m         VisionScreening:  No exam data present     PHYSICAL EXAM  GENERAL:  Reveals an alert female in NAD.  Vitals:  Per nursing notes.  EYES: PERRLA. Extraocular movements intact. Normal conjunctiva and lids.   ENT:  Hearing grossly normal.  Normal appearance to ears and nose.  Bilateral TM s, external canals, oropharynx normal. Normal lips, gums and teeth.  Normal nasal mucosa, septum and turbinates.  NECK:  Supple, without thyromegaly or mass.  LUNGS:  Clear to auscultation without crackles, wheezes or distress.  Normal respiratory effort.   CV:  Regular rate and rhythm without murmurs, rubs or gallops. No varicosities or edema. Carotids without bruits.   ABDOMEN:  Soft, non-tender, without organomegaly, masses, or hernias.   BREASTS:  Non-tender, without masses, nipple discharge, erythema, or axillary adenopathy.    :  Not examined  LYMPH: Normal palpation of neck, groin and axilla. No lymphadenopathy. No bruising.  NEURO:  CN II-XII intact.   PSYCH:  Alert & oriented with normal mood and affect.   SKIN:  Normal inspection and palpation.  MSK: Normal gait and station. Normal inspection, ROM, stability and strength:    Assessment Results 1/24/2019   Activities of Daily Living No help needed   Instrumental Activities of Daily Living No help needed   Mini Cog Total Score 4   Some recent data might be hidden     A Mini-Cog score of 0-2 suggests the possibility of dementia, score of 3-5 suggests no dementia    Identified Health Risks:     She is at risk for lack of exercise and has been provided with information to increase physical activity for the benefit of her well-being.  The patient was counseled and encouraged to consider modifying their diet and eating habits. She was provided with information on recommended healthy " diet options.  Information regarding advance directives (living bravo), including where she can download the appropriate form, was provided to the patient via the AVS.

## 2021-06-25 ENCOUNTER — RECORDS - HEALTHEAST (OUTPATIENT)
Dept: ADMINISTRATIVE | Facility: OTHER | Age: 71
End: 2021-06-25

## 2021-06-25 NOTE — PROGRESS NOTES
ASSESSMENT/ PLAN    1. Establishing care with new doctor, encounter for  Reviewed chart.  Updated her chart accordingly.  Up-to-date with all immunization except Shingrix and she's interested in getting vaccines. She will call her insurance. I suggested water aerobic classes for her to help with overall health.     2. Primary osteoarthritis of both knees  3. Rheumatoid arthritis, involving unspecified site, unspecified rheumatoid factor presence (H)  No longer on RA medications. Reviewed Dr. Price from  rheumatology's recent note.     4. Osteopenia of multiple sites  Last DEXA in 2017 per review [facility]; and there's osteopenia. Repeat in 3 years, so 2020.     5. Hypothyroidism, unspecified type  Last thyroid check 1/24/2019 and unremarkable.     6. Elevated BP without diagnosis of hypertension  140-150's systolic on 2 different checks today. Was elevated during her annual physical in Jan 2019 as well upper 140s-150's.  Advised home blood pressure check with cough which I prescribed for her today.  She will return in 2 weeks for nurse only blood pressure check.  - blood pressure monitor Kit; Check blood pressure daily  Dispense: 1 each; Refill: 0    Otherwise return in a year for another annual physical.     This note was created using Dragon dictation software, spelling errors may occur.     Greater than 45 minutes was spent today with patient ,more than 50% of time was counseling and coordination of care on the above issues      Lilliam Jones MD        SUBJECTIVE   eLsly Hutchinson is a 69 y.o. old female with a past medical history of osteoarthritis, hypothyroidism who presented to clinic today for further evaluation of establishing care. She has no concern today except some weight gain.  She was on rheumatoid arthritis medications for many years until she had to switch to a new rheumatologist at [facility] from [facility] and her diagnosis of rheumatoid arthritis was  questioned.  She was then taken off of all her rheumatoid arthritis medications.  She has not experienced any increase in her arthritis symptoms ever since being taken off of her rheumatoid arthritis medications. Today she's concerned about weight gain despite not changing her diet.  She does not exercise much.  Her last annual physical with her previous PCP was 1/24/2019 and lab work was all unremarkable. She is single. Has a dog. 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 Doesn't plan to retire yet.       Review of Systems:  Negative except as noted in HPI.     The following portions of the patient's history were reviewed and updated as appropriate: past medical history, past surgical history, family history, allergies, current medications and problem list.    Medical History  Active Ambulatory (Non-Hospital) Problems    Diagnosis     Primary osteoarthritis of both knees     Benign Adenomatous Polyp Of The Large Intestine     Menopause     Osteoporosis     Hypothyroidism     Rheumatoid arthritis (H)     History reviewed. No pertinent past medical history.    Surgical History  She  has a past surgical history that includes pr osteotomy 1st metatarsal,base/shaft; pr corrj hallux valgus w/sesmdc w/2 osteot; pr dilation/curettage,diagnostic; Knee arthroscopy (Left, 04/2016); Hysterectomy (2002?); Oophorectomy (Bilateral, 2002?); and Finger ganglion cyst excision (Right, 10/10/2016).    Social History  Reviewed, and she  reports that she has quit smoking. She has quit using smokeless tobacco.   Social History     Social History Narrative     and single now. Ex  passed away. 3 children, all adults. 7 grandchildren. Lives alone. Quit smoking 1994. Social alcohol. No hx of rec drug use.  when she was still working.        Family History  Reviewed, and family history includes Lung cancer in her father; Throat cancer in her maternal uncle.    Medications  Reviewed and  reconciled    Allergies  Allergies   Allergen Reactions     Sulfa (Sulfonamide Antibiotics)          OBJECTIVE  Physical Exam:  Vital signs: /80 (Patient Site: Left Arm, Patient Position: Sitting, Cuff Size: Adult Regular)   Pulse 72   Temp 97.7  F (36.5  C) (Oral)   Resp 16   Wt 152 lb 6.4 oz (69.1 kg)   BMI 26.05 kg/m    Weight: 152 lb 6.4 oz (69.1 kg)    General appearance: pleasant, appears stated age, cooperative and in no distress  Eyes: EOMs intact, PERRL, conjunctivae normal.   ENT: moist oral mucosa, posterior oropharynx normal. Thyroid normal to palpation, no lump or mass or tenderness  Lymph: no cervical/supraclavicular adenopathy  Respiratory: clear to auscultation bilaterally, good air movement throughout, no wheezing or crackles, speaking full sentences without difficulty  Cardiovascular: regular rate and rhythm, no murmur appreciated, no leg edema  Skin: no rashes  Neuro: alert oriented x 3, grossly normal otherwise  Psych: normal affect, appropriate conversation

## 2021-06-25 NOTE — TELEPHONE ENCOUNTER
Refill Approved    Rx renewed per Medication Renewal Policy. Medication was last renewed on 11/30/18  Last OV 1/24/19  Candi Tse, [facility] Triage/Med Refill 3/18/2019     Requested Prescriptions   Pending Prescriptions Disp Refills     levothyroxine (SYNTHROID, LEVOTHROID) 50 MCG tablet [Pharmacy Med Name: LEVOTHYROXINE SODIUM 50 MCG Tablet] 90 tablet 2     Sig: TAKE 1 TABLET EVERY DAY    Thyroid Hormones Protocol Passed - 3/14/2019 10:32 AM       Passed - Provider visit in past 12 months or next 3 months    Last office visit with prescriber/PCP: 1/8/2018 Dinorah Chester MD OR same dept: Visit date not found OR same specialty: 1/8/2018 Dinorah Chester MD  Last physical: 1/24/2019 Last MTM visit: Visit date not found   Next visit within 3 mo: Visit date not found  Next physical within 3 mo: Visit date not found  Prescriber OR PCP: Dinorah Chester MD  Last diagnosis associated with med order: 1. Hypothyroid  - levothyroxine (SYNTHROID, LEVOTHROID) 50 MCG tablet [Pharmacy Med Name: LEVOTHYROXINE SODIUM 50 MCG Tablet]; TAKE 1 TABLET EVERY DAY    Dispense: 90 tablet; Refill: 2    If protocol passes may refill for 12 months if within 3 months of last provider visit (or a total of 15 months).            Passed - TSH on file in past 12 months for patient age 12 & older    TSH   Date Value Ref Range Status   01/24/2019 2.82 0.30 - 5.00 uIU/mL Final

## 2021-06-26 ENCOUNTER — HEALTH MAINTENANCE LETTER (OUTPATIENT)
Age: 71
End: 2021-06-26

## 2021-07-21 ENCOUNTER — RECORDS - HEALTHEAST (OUTPATIENT)
Dept: ADMINISTRATIVE | Facility: CLINIC | Age: 71
End: 2021-07-21

## 2021-10-11 ENCOUNTER — HEALTH MAINTENANCE LETTER (OUTPATIENT)
Age: 71
End: 2021-10-11

## 2021-11-19 ENCOUNTER — TRANSFERRED RECORDS (OUTPATIENT)
Dept: HEALTH INFORMATION MANAGEMENT | Facility: CLINIC | Age: 71
End: 2021-11-19
Payer: COMMERCIAL

## 2021-12-30 ENCOUNTER — OFFICE VISIT (OUTPATIENT)
Dept: FAMILY MEDICINE | Facility: CLINIC | Age: 71
End: 2021-12-30
Payer: COMMERCIAL

## 2021-12-30 VITALS
WEIGHT: 150.8 LBS | TEMPERATURE: 96.2 F | OXYGEN SATURATION: 98 % | SYSTOLIC BLOOD PRESSURE: 162 MMHG | RESPIRATION RATE: 16 BRPM | HEIGHT: 64 IN | HEART RATE: 77 BPM | DIASTOLIC BLOOD PRESSURE: 83 MMHG | BODY MASS INDEX: 25.74 KG/M2

## 2021-12-30 DIAGNOSIS — Z97.4 WEARS HEARING AID IN BOTH EARS: ICD-10-CM

## 2021-12-30 DIAGNOSIS — M06.9 RHEUMATOID ARTHRITIS, INVOLVING UNSPECIFIED SITE, UNSPECIFIED WHETHER RHEUMATOID FACTOR PRESENT (H): ICD-10-CM

## 2021-12-30 DIAGNOSIS — Z00.01 ENCOUNTER FOR GENERAL ADULT MEDICAL EXAMINATION WITH ABNORMAL FINDINGS: Primary | ICD-10-CM

## 2021-12-30 DIAGNOSIS — E03.9 HYPOTHYROIDISM, UNSPECIFIED TYPE: ICD-10-CM

## 2021-12-30 DIAGNOSIS — Z78.0 POSTMENOPAUSAL STATUS: ICD-10-CM

## 2021-12-30 DIAGNOSIS — E55.9 VITAMIN D DEFICIENCY: ICD-10-CM

## 2021-12-30 DIAGNOSIS — Z13.220 SCREENING FOR HYPERLIPIDEMIA: ICD-10-CM

## 2021-12-30 DIAGNOSIS — R03.0 ELEVATED BP WITHOUT DIAGNOSIS OF HYPERTENSION: ICD-10-CM

## 2021-12-30 DIAGNOSIS — M85.80 OSTEOPENIA WITH HIGH RISK OF FRACTURE: ICD-10-CM

## 2021-12-30 LAB
ALBUMIN SERPL-MCNC: 4 G/DL (ref 3.5–5)
ALP SERPL-CCNC: 68 U/L (ref 45–120)
ALT SERPL W P-5'-P-CCNC: 13 U/L (ref 0–45)
ANION GAP SERPL CALCULATED.3IONS-SCNC: 9 MMOL/L (ref 5–18)
AST SERPL W P-5'-P-CCNC: 17 U/L (ref 0–40)
BILIRUB SERPL-MCNC: 0.6 MG/DL (ref 0–1)
BUN SERPL-MCNC: 16 MG/DL (ref 8–28)
CALCIUM SERPL-MCNC: 9.8 MG/DL (ref 8.5–10.5)
CHLORIDE BLD-SCNC: 104 MMOL/L (ref 98–107)
CHOLEST SERPL-MCNC: 231 MG/DL
CO2 SERPL-SCNC: 25 MMOL/L (ref 22–31)
CREAT SERPL-MCNC: 0.81 MG/DL (ref 0.6–1.1)
ERYTHROCYTE [DISTWIDTH] IN BLOOD BY AUTOMATED COUNT: 12.2 % (ref 10–15)
FASTING STATUS PATIENT QL REPORTED: YES
GFR SERPL CREATININE-BSD FRML MDRD: 77 ML/MIN/1.73M2
GLUCOSE BLD-MCNC: 107 MG/DL (ref 70–125)
HCT VFR BLD AUTO: 44.4 % (ref 35–47)
HDLC SERPL-MCNC: 95 MG/DL
HGB BLD-MCNC: 14.5 G/DL (ref 11.7–15.7)
LDLC SERPL CALC-MCNC: 118 MG/DL
MCH RBC QN AUTO: 29.9 PG (ref 26.5–33)
MCHC RBC AUTO-ENTMCNC: 32.7 G/DL (ref 31.5–36.5)
MCV RBC AUTO: 92 FL (ref 78–100)
PLATELET # BLD AUTO: 266 10E3/UL (ref 150–450)
POTASSIUM BLD-SCNC: 4.2 MMOL/L (ref 3.5–5)
PROT SERPL-MCNC: 7.5 G/DL (ref 6–8)
PTH-INTACT SERPL-MCNC: 63 PG/ML (ref 10–86)
RBC # BLD AUTO: 4.85 10E6/UL (ref 3.8–5.2)
SODIUM SERPL-SCNC: 138 MMOL/L (ref 136–145)
TRIGL SERPL-MCNC: 89 MG/DL
TSH SERPL DL<=0.005 MIU/L-ACNC: 3.34 UIU/ML (ref 0.3–5)
WBC # BLD AUTO: 6.3 10E3/UL (ref 4–11)

## 2021-12-30 PROCEDURE — 83970 ASSAY OF PARATHORMONE: CPT | Performed by: FAMILY MEDICINE

## 2021-12-30 PROCEDURE — 99397 PER PM REEVAL EST PAT 65+ YR: CPT | Performed by: FAMILY MEDICINE

## 2021-12-30 PROCEDURE — 80061 LIPID PANEL: CPT | Performed by: FAMILY MEDICINE

## 2021-12-30 PROCEDURE — 36415 COLL VENOUS BLD VENIPUNCTURE: CPT | Performed by: FAMILY MEDICINE

## 2021-12-30 PROCEDURE — 85027 COMPLETE CBC AUTOMATED: CPT | Performed by: FAMILY MEDICINE

## 2021-12-30 PROCEDURE — 84443 ASSAY THYROID STIM HORMONE: CPT | Performed by: FAMILY MEDICINE

## 2021-12-30 PROCEDURE — 80053 COMPREHEN METABOLIC PANEL: CPT | Performed by: FAMILY MEDICINE

## 2021-12-30 PROCEDURE — 99214 OFFICE O/P EST MOD 30 MIN: CPT | Mod: 25 | Performed by: FAMILY MEDICINE

## 2021-12-30 PROCEDURE — 82306 VITAMIN D 25 HYDROXY: CPT | Performed by: FAMILY MEDICINE

## 2021-12-30 RX ORDER — LEVOTHYROXINE SODIUM 50 UG/1
50 TABLET ORAL DAILY
Qty: 90 TABLET | Refills: 3 | Status: SHIPPED | OUTPATIENT
Start: 2021-12-30 | End: 2022-03-04

## 2021-12-30 RX ORDER — GABAPENTIN 600 MG/1
600 TABLET ORAL AT BEDTIME
Qty: 90 TABLET | Refills: 3 | Status: SHIPPED | OUTPATIENT
Start: 2021-12-30 | End: 2022-04-06

## 2021-12-30 ASSESSMENT — MIFFLIN-ST. JEOR: SCORE: 1175.05

## 2021-12-30 ASSESSMENT — ACTIVITIES OF DAILY LIVING (ADL): CURRENT_FUNCTION: NO ASSISTANCE NEEDED

## 2021-12-30 NOTE — PATIENT INSTRUCTIONS
Please get a blood pressure cuff and start measuring blood pressure daily for 2 weeks. Goal blood pressure <140/90 .     Patient Education   Personalized Prevention Plan  You are due for the preventive services outlined below.  Your care team is available to assist you in scheduling these services.  If you have already completed any of these items, please share that information with your care team to update in your medical record.  Health Maintenance Due   Topic Date Due     ANNUAL REVIEW OF HM ORDERS  Never done     LUNG CANCER SCREENING  Never done     Osteoporosis Screening  10/14/2021     FALL RISK ASSESSMENT  12/04/2021       Exercise for a Healthier Heart  You may wonder how you can improve the health of your heart. If you re thinking about exercise, you re on the right track. You don t need to become an athlete. But you do need a certain amount of brisk exercise to help strengthen your heart. If you have been diagnosed with a heart condition, your healthcare provider may advise exercise to help stabilize your condition. To help make exercise a habit, choose safe, fun activities.      Exercise with a friend. When activity is fun, you're more likely to stick with it.   Before you start  Check with your healthcare provider before starting an exercise program. This is especially important if you have not been active for a while. It's also important if you have a long-term (chronic) health problem such as heart disease, diabetes, or obesity. Or if you are at high risk for having these problems.   Why exercise?  Exercising regularly offers many healthy rewards. It can help you do all of the following:     Improve your blood cholesterol level to help prevent further heart trouble    Lower your blood pressure to help prevent a stroke or heart attack    Control diabetes, or reduce your risk of getting this disease    Improve your heart and lung function    Reach and stay at a healthy weight    Make your muscles  stronger so you can stay active    Prevent falls and fractures by slowing the loss of bone mass (osteoporosis)    Manage stress better    Reduce your blood pressure    Improve your sense of self and your body image  Exercise tips      Ease into your routine. Set small goals. Then build on them. If you are not sure what your activity level should be, talk with your healthcare provider first before starting an exercise routine.    Exercise on most days. Aim for a total of 150 minutes (2 hours and 30 minutes) or more of moderate-intensity aerobic activity each week. Or 75 minutes (1 hour and 15 minutes) or more of vigorous-intensity aerobic activity each week. Or try for a combination of both. Moderate activity means that you breathe heavier and your heart rate increases but you can still talk. Think about doing 40 minutes of moderate exercise, 3 to 4 times a week. For best results, activity should last for about 40 minutes to lower blood pressure and cholesterol. It's OK to work up to the 40-minute period over time. Examples of moderate-intensity activity are walking 1 mile in 15 minutes. Or doing 30 to 45 minutes of yard work.    Step up your daily activity level.  Along with your exercise program, try being more active the whole day. Walk instead of drive. Or park further away so that you take more steps each day. Do more household tasks or yard work. You may not be able to meet the advised mount of physical activity. But doing some moderate- or vigorous-intensity aerobic activity can help reduce your risk for heart disease. Your healthcare provider can help you figure out what is best for you.    Choose 1 or more activities you enjoy.  Walking is one of the easiest things you can do. You can also try swimming, riding a bike, dancing, or taking an exercise class.    When to call your healthcare provider  Call your healthcare provider if you have any of these:     Chest pain or feel dizzy or lightheaded    Burning,  tightness, pressure, or heaviness in your chest, neck, shoulders, back, or arms    Abnormal shortness of breath    More joint or muscle pain    A very fast or irregular heartbeat (palpitations)  [employer] last reviewed this educational content on 7/1/2019 2000-2021 [employer]. All rights reserved. This information is not intended as a substitute for professional medical care. Always follow your healthcare professional's instructions.          Understanding USDA MyPlate  The USDA has guidelines to help you make healthy food choices. These are called MyPlate. MyPlate shows the food groups that make up healthy meals using the image of a place setting. Before you eat, think about the healthiest choices for what to put on your plate or in your cup or bowl. To learn more about building a healthy plate, visit www.choosemyplate.gov.    The food groups    Fruits. Any fruit or 100% fruit juice counts as part of the Fruit Group. Fruits may be fresh, canned, frozen, or dried, and may be whole, cut-up, or pureed. Make 1/2 of your plate fruits and vegetables.    Vegetables. Any vegetable or 100% vegetable juice counts as a member of the Vegetable Group. Vegetables may be fresh, frozen, canned, or dried. They can be served raw or cooked and may be whole, cut-up, or mashed. Make 1/2 of your plate fruits and vegetables.    Grains. All foods made from grains are part of the Grains Group. These include wheat, rice, oats, cornmeal, and barley. Grains are often used to make foods such as bread, pasta, oatmeal, cereal, tortillas, and grits. Grains should be no more than 1/4 of your plate. At least half of your grains should be whole grains.    Protein. This group includes meat, poultry, seafood, beans and peas, eggs, processed soy products (such as tofu), nuts (including nut butters), and seeds. Make protein choices no more than 1/4 of your plate. Meat and poultry choices should be lean or low fat.    Dairy. The Dairy  Group includes all fluid milk products and foods made from milk that contain calcium, such as yogurt and cheese. (Foods that have little calcium, such as cream, butter, and cream cheese, are not part of this group.) Most dairy choices should be low-fat or fat-free.    Oils. Oils aren't a food group, but they do contain essential nutrients. However it's important to watch your intake of oils. These are fats that are liquid at room temperature. They include canola, corn, olive, soybean, vegetable, and sunflower oil. Foods that are mainly oil include mayonnaise, certain salad dressings, and soft margarines. You likely already get your daily oil allowance from the foods you eat.  Things to limit  Eating healthy also means limiting these things in your diet:       Salt (sodium). Many processed foods have a lot of sodium. To keep sodium intake down, eat fresh vegetables, meats, poultry, and seafood when possible. Purchase low-sodium, reduced-sodium, or no-salt-added food products at the store. And don't add salt to your meals at home. Instead, season them with herbs and spices such as dill, oregano, cumin, and paprika. Or try adding flavor with lemon or lime zest and juice.    Saturated fat. Saturated fats are most often found in animal products such as beef, pork, and chicken. They are often solid at room temperature, such as butter. To reduce your saturated fat intake, choose leaner cuts of meat and poultry. And try healthier cooking methods such as grilling, broiling, roasting, or baking. For a simple lower-fat swap, use plain nonfat yogurt instead of mayonnaise when making potato salad or macaroni salad.    Added sugars. These are sugars added to foods. They are in foods such as ice cream, candy, soda, fruit drinks, sports drinks, energy drinks, cookies, pastries, jams, and syrups. Cut down on added sugars by sharing sweet treats with a family member or friend. You can also choose fruit for dessert, and drink water or  other unsweetened beverages.     StayWell last reviewed this educational content on 6/1/2020 2000-2021 [employer]. All rights reserved. This information is not intended as a substitute for professional medical care. Always follow your healthcare professional's instructions.          Signs of Hearing Loss      Hearing much better with one ear can be a sign of hearing loss.   Hearing loss is a problem shared by many people. In fact, it is one of the most common health problems, particularly as people age. Most people age 65 and older have some hearing loss. By age 80, almost everyone does. Hearing loss often occurs slowly over the years. So you may not realize your hearing has gotten worse.  Have your hearing checked  Call your healthcare provider if you:    Have to strain to hear normal conversation    Have to watch other people s faces very carefully to follow what they re saying    Need to ask people to repeat what they ve said    Often misunderstand what people are saying    Turn the volume of the television or radio up so high that others complain    Feel that people are mumbling when they re talking to you    Find that the effort to hear leaves you feeling tired and irritated    Notice, when using the phone, that you hear better with one ear than the other  [employer] last reviewed this educational content on 1/1/2020 2000-2021 [employer]. All rights reserved. This information is not intended as a substitute for professional medical care. Always follow your healthcare professional's instructions.

## 2021-12-30 NOTE — PROGRESS NOTES
"SYesUBJECTIVE:   Lesly Hutchinson is a 71 year old female who presents for Preventive Visit.    Patient has been advised of split billing requirements and indicates understanding: Yes   Are you in the first 12 months of your Medicare coverage?  No    Healthy Habits:     In general, how would you rate your overall health?  Good    Frequency of exercise:  None    Do you usually eat at least 4 servings of fruit and vegetables a day, include whole grains    & fiber and avoid regularly eating high fat or \"junk\" foods?  No    Taking medications regularly:  Yes    Ability to successfully perform activities of daily living:  No assistance needed    Home Safety:  No safety concerns identified    Hearing Impairment:  Difficulty following a conversation in a noisy restaurant or crowded room, feel that people are mumbling or not speaking clearly, difficulty following dialogue in the theater, difficult to understand a speaker at a public meeting or [employer] service, need to ask people to speak up or repeat themselves and find that men's voices are easier to understand than woman's    In the past 6 months, have you been bothered by leaking of urine?  No    In general, how would you rate your overall mental or emotional health?  Good      PHQ-2 Total Score: 0    Additional concerns today:  No    Do you feel safe in your environment? Yes    Have you ever done Advance Care Planning? (For example, a Health Directive, POLST, or a discussion with a medical provider or your loved ones about your wishes): No, advance care planning information given to patient to review.  Patient plans to discuss their wishes with loved ones or provider.         Fall risk  Fallen 2 or more times in the past year?: No  Any fall with injury in the past year?: No    Cognitive Screening   1) Repeat 3 items (Leader, Season, Table)    2) Clock draw: ABNORMAL - discussed with patient and she corrected it.   3) 3 item recall: Recalls 3 objects  Results: 3 items " "recalled: COGNITIVE IMPAIRMENT LESS LIKELY    Mini-CogTM Copyright NOELLE Winston. Licensed by the author for use in [facility]; reprinted with permission ([email or web address]). All rights reserved.      Do you have sleep apnea, excessive snoring or daytime drowsiness?: NO    Reviewed and updated as needed this visit by clinical staff   Allergies  Meds             Reviewed and updated as needed this visit by Provider               Social History     Tobacco Use     Smoking status: Former Smoker     Smokeless tobacco: Former User     Tobacco comment: quit 21yrs ago   Substance Use Topics     Alcohol use: Not on file       Alcohol Use 12/30/2021   Prescreen: >3 drinks/day or >7 drinks/week? No       PROBLEMS TO ADD ON...  Chief Complaint   Patient presents with     Wellness Visit       Current providers sharing in care for this patient include:   Patient Care Team:  Lilliam Jones MD as PCP - General  Jack Summers, PharmD as Pharmacist (Pharmacist)  Lilliam Jones MD as Assigned PCP    The following health maintenance items are reviewed in Epic and correct as of today:  Health Maintenance Due   Topic Date Due     ANNUAL REVIEW OF  ORDERS  Never done     LUNG CANCER SCREENING  Never done     DEXA  10/14/2021     FALL RISK ASSESSMENT  12/04/2021     MEDICARE ANNUAL WELLNESS VISIT  12/04/2021     Lab work is in process      Review of Systems  Constitutional, HEENT, cardiovascular, pulmonary, GI, , musculoskeletal, neuro, skin, endocrine and psych systems are negative, except as otherwise noted.    OBJECTIVE:   BP (!) 162/83   Pulse 77   Temp (!) 96.2  F (35.7  C) (Oral)   Resp 16   Ht 1.619 m (5' 3.75\")   Wt 68.4 kg (150 lb 12.8 oz)   SpO2 98%   BMI 26.09 kg/m   Estimated body mass index is 27.36 kg/m  as calculated from the following:    Height as of 12/4/20: 1.619 m (5' 3.75\").    Weight as of 12/4/20: 71.7 kg (158 lb 2 oz).  Physical Exam  GENERAL APPEARANCE: healthy, alert and no " distress  EYES: Eyes grossly normal to inspection, PERRL and conjunctivae and sclerae normal  HENT: grossly normal.   NECK: no adenopathy, no asymmetry, masses, or scars and thyroid normal to palpation  RESP: lungs clear to auscultation - no rales, rhonchi or wheezes  BREAST: normal without masses, tenderness or nipple discharge and no palpable axillary masses or adenopathy  CV: regular rate and rhythm, normal S1 S2, no S3 or S4, no murmur, click or rub, no peripheral edema and peripheral pulses strong  ABDOMEN: soft, nontender, no hepatosplenomegaly, no masses and bowel sounds normal  MS: no musculoskeletal defects are noted and gait is age appropriate without ataxia  SKIN: no suspicious lesions or rashes  NEURO: Normal strength and tone, sensory exam grossly normal, mentation intact and speech normal  PSYCH: mentation appears normal and affect normal/bright    Diagnostic Test Results:  Labs reviewed in Epic    ASSESSMENT / PLAN:   Lesly was seen today for wellness visit.    Diagnoses and all orders for this visit:    Encounter for general adult medical examination with abnormal findings  Annual wellness visit.  Up-to-date with mammogram and colonoscopy.  No longer needing Pap smear.  Needs bone density scan this year.  Return in a year for next annual physical    Rheumatoid arthritis, involving unspecified site, unspecified whether rheumatoid factor present (H) - rheumatology doesn't think she has RA  Discussed this with her today.  Stable.  Take gabapentin at nighttime to help with sleep as well.  -     gabapentin (NEURONTIN) 600 MG tablet; Take 1 tablet (600 mg) by mouth At Bedtime  -     CBC with platelets; Future  -     CBC with platelets    Hypothyroidism, unspecified type  No symptoms.  Lab to monitor.  Refill given  -     levothyroxine (SYNTHROID/LEVOTHROID) 50 MCG tablet; Take 1 tablet (50 mcg) by mouth daily  -     TSH with free T4 reflex; Future  -     Vitamin D Deficiency; Future  -     TSH with free T4  "reflex  -     Vitamin D Deficiency    Osteopenia with high risk of fracture  Last bone density 2019.  Need repeat this year  -     DX Hip/Pelvis/Spine; Future  -     Parathyroid Hormone Intact; Future  -     Vitamin D Deficiency; Future  -     Parathyroid Hormone Intact  -     Vitamin D Deficiency    Elevated BP without diagnosis of hypertension  BP Has been increasing over the year.  Her annual physical last year her blood pressure was elevated as well.  Advised her to check her blood pressure at home using a blood pressure cuff.  She will send me a [employer] message in 1 to 2 weeks let me know if blood pressure is 140/90, I will have to recommend an antihypertensive.  Patient agreed with plan.  -     Comprehensive metabolic panel (BMP + Alb, Alk Phos, ALT, AST, Total. Bili, TP); Future  -     Comprehensive metabolic panel (BMP + Alb, Alk Phos, ALT, AST, Total. Bili, TP)    Postmenopausal status  -     DX Hip/Pelvis/Spine; Future    Screening for hyperlipidemia  -     Lipid panel; Future  -     Lipid panel    Wears hearing aid in both ears  Noted    Vitamin D deficiency  -     Vitamin D Deficiency; Future  -     Vitamin D Deficiency    Other orders  -     REVIEW OF HEALTH MAINTENANCE PROTOCOL ORDERS      Patient has been advised of split billing requirements and indicates understanding: Yes  COUNSELING:  Reviewed preventive health counseling, as reflected in patient instructions       Regular exercise       Healthy diet/nutrition       Osteoporosis prevention/bone health    Estimated body mass index is 27.36 kg/m  as calculated from the following:    Height as of 12/4/20: 1.619 m (5' 3.75\").    Weight as of 12/4/20: 71.7 kg (158 lb 2 oz).        She reports that she has quit smoking. She has quit using smokeless tobacco.      Appropriate preventive services were discussed with this patient, including applicable screening as appropriate for cardiovascular disease, diabetes, osteopenia/osteoporosis, and glaucoma.  As " appropriate for age/gender, discussed screening for colorectal cancer, prostate cancer, breast cancer, and cervical cancer. Checklist reviewing preventive services available has been given to the patient.    Reviewed patients plan of care and provided an AVS. The Basic Care Plan (routine screening as documented in Health Maintenance) for Lesly meets the Care Plan requirement. This Care Plan has been established and reviewed with the Patient.    Counseling Resources:  ATP IV Guidelines  Pooled Cohorts Equation Calculator  Breast Cancer Risk Calculator  Breast Cancer: Medication to Reduce Risk  FRAX Risk Assessment  ICSI Preventive Guidelines  Dietary Guidelines for Americans, 2010  USDA's MyPlate  ASA Prophylaxis  Lung CA Screening    Lilliam Jones MD  [facility]    Identified Health Risks:    She is at risk for lack of exercise and has been provided with information to increase physical activity for the benefit of her well-being.  The patient was counseled and encouraged to consider modifying their diet and eating habits. She was provided with information on recommended healthy diet options.  The patient was provided with written information regarding signs of hearing loss.

## 2021-12-31 PROBLEM — E78.5 HYPERLIPIDEMIA LDL GOAL <100: Status: ACTIVE | Noted: 2021-12-31

## 2021-12-31 LAB — DEPRECATED CALCIDIOL+CALCIFEROL SERPL-MC: 56 UG/L (ref 30–80)

## 2022-03-04 DIAGNOSIS — E03.9 HYPOTHYROIDISM, UNSPECIFIED TYPE: ICD-10-CM

## 2022-03-04 RX ORDER — LEVOTHYROXINE SODIUM 50 UG/1
50 TABLET ORAL DAILY
Qty: 90 TABLET | Refills: 0 | Status: SHIPPED | OUTPATIENT
Start: 2022-03-04 | End: 2022-06-17

## 2022-04-05 DIAGNOSIS — M06.9 RHEUMATOID ARTHRITIS, INVOLVING UNSPECIFIED SITE, UNSPECIFIED WHETHER RHEUMATOID FACTOR PRESENT (H): ICD-10-CM

## 2022-04-06 RX ORDER — GABAPENTIN 600 MG/1
TABLET ORAL
Qty: 90 TABLET | Refills: 1 | Status: SHIPPED | OUTPATIENT
Start: 2022-04-06 | End: 2022-09-13

## 2022-05-20 ENCOUNTER — TRANSFERRED RECORDS (OUTPATIENT)
Dept: HEALTH INFORMATION MANAGEMENT | Facility: CLINIC | Age: 72
End: 2022-05-20
Payer: COMMERCIAL

## 2022-06-16 DIAGNOSIS — E03.9 HYPOTHYROIDISM, UNSPECIFIED TYPE: ICD-10-CM

## 2022-06-17 RX ORDER — LEVOTHYROXINE SODIUM 50 UG/1
TABLET ORAL
Qty: 90 TABLET | Refills: 1 | Status: SHIPPED | OUTPATIENT
Start: 2022-06-17 | End: 2022-12-14

## 2022-06-17 NOTE — TELEPHONE ENCOUNTER
"Last Written Prescription Date:  3/4/22  Last Fill Quantity: 90,  # refills: 0   Last office visit provider:  12/30/21     Requested Prescriptions   Pending Prescriptions Disp Refills     levothyroxine (SYNTHROID/LEVOTHROID) 50 MCG tablet [Pharmacy Med Name: L-THYROXINE (SYNTHROID) TABS 50MCG] 90 tablet 3     Sig: TAKE 1 TABLET DAILY       Thyroid Protocol Passed - 6/16/2022 11:18 PM        Passed - Patient is 12 years or older        Passed - Recent (12 mo) or future (30 days) visit within the authorizing provider's specialty     Patient has had an office visit with the authorizing provider or a provider within the authorizing providers department within the previous 12 mos or has a future within next 30 days. See \"Patient Info\" tab in inbasket, or \"Choose Columns\" in Meds & Orders section of the refill encounter.              Passed - Medication is active on med list        Passed - Normal TSH on file in past 12 months     Recent Labs   Lab Test 12/30/21  1344   TSH 3.34              Passed - No active pregnancy on record     If patient is pregnant or has had a positive pregnancy test, please check TSH.          Passed - No positive pregnancy test in past 12 months     If patient is pregnant or has had a positive pregnancy test, please check TSH.               Sydney Panchal RN 06/17/22 5:02 PM  "

## 2022-08-07 ENCOUNTER — NURSE TRIAGE (OUTPATIENT)
Dept: NURSING | Facility: CLINIC | Age: 72
End: 2022-08-07

## 2022-08-08 ENCOUNTER — VIRTUAL VISIT (OUTPATIENT)
Dept: FAMILY MEDICINE | Facility: CLINIC | Age: 72
End: 2022-08-08
Payer: COMMERCIAL

## 2022-08-08 DIAGNOSIS — U07.1 INFECTION DUE TO 2019 NOVEL CORONAVIRUS: Primary | ICD-10-CM

## 2022-08-08 PROCEDURE — 99213 OFFICE O/P EST LOW 20 MIN: CPT | Mod: 95 | Performed by: NURSE PRACTITIONER

## 2022-08-08 NOTE — PROGRESS NOTES
"Lesly is a 72 year old who is being evaluated via a billable telephone visit.      What phone number would you like to be contacted at? [phone]  How would you like to obtain your AVS? MyChart    Assessment & Plan     Infection due to 2019 novel coronavirus  Meets criteria for treatment.  Risks,benefits and side effects discussed.  Reviewed supportive cares and isolation guidelines.   - nirmatrelvir and ritonavir (PAXLOVID) therapy pack; Take 3 tablets by mouth 2 times daily for 5 days (Take 2 Nirmatrelvir tablets and 1 Ritonavir tablet twice daily for 5 days)               COVID-19 positive patient.  Encounter for consideration of medication intervention. Patient does qualify for a prescription. Full discussion with patient including medication options, risks and benefits. Potential drug interactions reviewed with patient.     Treatment Planned Paxlovid, Rx sent to her pharmacy  Temporary change to home medications:  None     Estimated body mass index is 26.09 kg/m  as calculated from the following:    Height as of 12/30/21: 1.619 m (5' 3.75\").    Weight as of 12/30/21: 68.4 kg (150 lb 12.8 oz).  GFR Estimate   Date Value Ref Range Status   12/30/2021 77 >60 mL/min/1.73m2 Final     Comment:     Effective December 21, 2021 eGFRcr in adults is calculated using the 2021 CKD-EPI creatinine equation which includes age and gender (Regla et al., NEJ, DOI: 10.1056/UNGSpi2999397)   12/04/2020 >60 >60 mL/min/1.73m2 Final     No results found for: [id number]    No follow-ups on file.    SISSY Monet CNP  [facility]    Subjective   Lesly is a 72 year old, presenting for the following health issues:  Covid Concern      HPI       COVID-19 Symptom Review  How many days ago did these symptoms start? Late Saturday night into Sunday Morning 8/7/2022    Are any of the following symptoms significant for you?  New or worsening difficulty breathing? Yes    Please describe what kind of difficulty you " are having breathing:Mild dyspnea (able to do ADLs without difficulty, mild shortness of breath with activities such as climbing one or two flights of stairs or walking briskly)    Worsening cough? Yes, I am coughing up mucus.    Fever or chills? Yes, I felt feverish or had chills.    Headache: YES    Sore throat: YES- yesterday    Chest pain: No    Diarrhea: No    Body aches? No    What treatments has patient tried? Ibuprofen    Does patient live in a nursing home, group home, or shelter? No  Does patient have a way to get food/medications during quarantined? Yes, I have a friend or family member who can help me.        Started getting sick on 8/7/22 and had a positive home test.            Review of Systems   Constitutional, HEENT, cardiovascular, pulmonary, gi and gu systems are negative, except as otherwise noted.      Objective           Vitals:  No vitals were obtained today due to virtual visit.    Physical Exam   healthy, alert and no distress  PSYCH: Alert and oriented times 3; coherent speech, normal   rate and volume, able to articulate logical thoughts, able   to abstract reason, no tangential thoughts, no hallucinations   or delusions  Her affect is normal  RESP: No cough, no audible wheezing, able to talk in full sentences  Remainder of exam unable to be completed due to telephone visits    No results found for this or any previous visit (from the past 24 hour(s)).            Phone call duration: 13 minutes    .  ..

## 2022-08-08 NOTE — TELEPHONE ENCOUNTER
"Patient just tested positive with covid.  Symptoms started last night.  Runny nose and coughing.  Patient is concerned due to living with son and would like some advise.    Patient denies any breathing issues, no fever and no chest pain/pressure.    Care advise: reassurance, OTC medications for symptoms, quarantine information, keeping 6 ft distance, wash hands, clean high touch surfaces, push fluids, rest.  Call back if fever over 103, chest pain or breathing issues.    Sent patient to scheduling to make a virtual visit    Coronavirus (COVID-19) Notification    Caller Name (Patient, parent, daughter/son, grandparent, etc)  Lesly    Reason for call  Notify of Positive Coronavirus (COVID-19) lab results, assess symptoms,  review  [employer] [place] recommendations    Lab Result    Lab test:  2019-nCoV rRt-PCR or SARS-CoV-2 PCR    Oropharyngeal AND/OR nasopharyngeal swabs is POSITIVE for 2019-nCoV RNA/SARS-COV-2 PCR (COVID-19 virus)    Brief introduction script  Introduce self then review script:  \"I am calling on behalf of [employer].  We were notified that your Coronavirus test (COVID-19) for was POSITIVE for the virus.\"    Gather patient reported symptoms   Assessment   Current Symptoms at time of phone call, reported by patient: (if no symptoms, document No symptoms] Runny nose, coughing   Date of Symptom(s) onset (if applicable) 8/6/2022     If at time of call, Patients symptoms hare worsened, the Patient should contact 911 or have someone drive them to Emergency Dept promptly:      If Patient calling 911, inform 911 personal that you have tested positive for the Coronavirus (COVID-19).  Place mask on and await 911 to arrive.    If Emergency Dept, If possible, please have another adult drive you to the Emergency Dept but you need to wear mask when in contact with other people.      Monoclonal Antibody Administration    You may be eligible to receive a new treatment with a monoclonal antibody for preventing " hospitalization in patients at high risk for complications from COVID-19. This medication is still experimental and available on a limited basis; it is given through an IV and must be given at an infusion center. Please note that not all people who are eligible will receive the medication since it is in limited supply.  Is the patient symptomatic and onset of symptoms within the last 7 days?  Yes  Is the patient interested in a visit with a provider to discuss treatment options?: Yes  Is the patient seen at [facility]?  No: Warm transfer caller to [phone] to be scheduled with a virtual urgent provider.  During transfer, instruct  on appropriate time frame for visit     Review information with Patient    Your result was positive. This means you have COVID-19 (coronavirus).      How can I protect others?    These guidelines are for isolating before returning to work, school or .       If you DO have symptoms:  o Stay home and away from others  - For at least 5 days after your symptoms started, AND   - You are fever free for 24 hours (with no medicine that reduces fever), AND  - Your other symptoms are better.  o Wear a mask for 10 full days any time you are around others.    If you DON'T have symptoms:  o Stay at home and away from others for at least 5 days after your positive test.  o Wear a mask for 10 full days any time you are around others.    There may be different guidelines for healthcare facilities. Please check with the specific sites before arriving.     If you've been told by a doctor that you were severely ill with COVID-19 or are immunocompromised, you should isolate for at least 10 days.    You should not go back to work until you meet the guidelines above for ending your home isolation. You don't need to be retested for COVID-19 before going back to work--studies show that you won't spread the virus if it's been at least 10 days since your symptoms started (or 20 days,  if you have a weak immune system).    Employers, schools, and daycares: This is an official notice for this person's medical guidelines for returning in-person. They must meet the above guidelines before going back to work, school, or  in person.    You will receive a positive COVID-19 letter via [employer] or the mail soon with additional self-care information.      Would you like me to review some of that information with you now?  Yes    How can I take care of myself?      Get lots of rest. Drink extra fluids (unless a doctor has told you not to).      Take Tylenol (acetaminophen) for fever or pain. If you have liver or kidney problems, ask your family doctor if it's okay to take Tylenol.     Take either:     650 mg (two 325 mg pills) every 4 to 6 hours, or     1,000 mg (two 500 mg pills) every 8 hours as needed.     Note: Do not take more than 3,000 mg in one day. Acetaminophen is found in many medicines (both prescribed and over-the-counter medicines). Read all labels to be sure you don't take too much.    For children, check the Tylenol bottle for the right dose (based on their age or weight).      If you have other health problems (like cancer, heart failure, an organ transplant or severe kidney disease): Call your specialty clinic if you don't feel better in the next 2 days.      Know when to call 911: Emergency warning signs include:    Trouble breathing or shortness of breath    Pain or pressure in the chest that doesn't go away    Feeling confused like you haven't felt before, or not being able to wake up    Bluish-colored lips or face        If you were tested for an upcoming procedure, please contact your provider for next steps.     Daysi Cisneros RN    Reason for Disposition    [1] COVID-19 diagnosed by positive lab test (e.g., PCR, rapid self-test kit) AND [2] mild symptoms (e.g., cough, fever, others) AND [3] no complications or SOB    Additional Information    Negative: SEVERE difficulty  breathing (e.g., struggling for each breath, speaks in single words)    Negative: Difficult to awaken or acting confused (e.g., disoriented, slurred speech)    Negative: Bluish (or gray) lips or face now    Negative: Shock suspected (e.g., cold/pale/clammy skin, too weak to stand, low BP, rapid pulse)    Negative: Sounds like a life-threatening emergency to the triager    Negative: [1] Diagnosed or suspected COVID-19 AND [2] symptoms lasting 3 or more weeks    Negative: [1] COVID-19 exposure AND [2] no symptoms    Negative: COVID-19 vaccine reaction suspected (e.g., fever, headache, muscle aches) occurring 1 to 3 days after getting vaccine    Negative: COVID-19 vaccine, questions about    Negative: [1] Lives with someone known to have influenza (flu test positive) AND [2] flu-like symptoms (e.g., cough, runny nose, sore throat, SOB; with or without fever)    Negative: [1] Adult with possible COVID-19 symptoms AND [2] triager concerned about severity of symptoms or other causes    Negative: COVID-19 and breastfeeding, questions about    Negative: SEVERE or constant chest pain or pressure  (Exception: Mild central chest pain, present only when coughing.)    Negative: MODERATE difficulty breathing (e.g., speaks in phrases, SOB even at rest, pulse 100-120)    Negative: [1] Headache AND [2] stiff neck (can't touch chin to chest)    Negative: Oxygen level (e.g., pulse oximetry) 90 percent or lower    Negative: Chest pain or pressure    Negative: Patient sounds very sick or weak to the triager    Negative: MILD difficulty breathing (e.g., minimal/no SOB at rest, SOB with walking, pulse <100)    Negative: Fever > 103 F (39.4 C)    Negative: [1] Fever > 101 F (38.3 C) AND [2] age > 60 years    Negative: [1] Fever > 100.0 F (37.8 C) AND [2] bedridden (e.g., nursing home patient, CVA, chronic illness, recovering from surgery)    Negative: HIGH RISK for severe COVID complications (e.g., weak immune system, age > 64 years,  obesity with BMI > 25, pregnant, chronic lung disease or other chronic medical condition)  (Exception: Already seen by PCP and no new or worsening symptoms.)    Negative: [1] HIGH RISK patient AND [2] influenza is widespread in the community AND [3] ONE OR MORE respiratory symptoms: cough, sore throat, runny or stuffy nose    Negative: [1] HIGH RISK patient AND [2] influenza exposure within the last 7 days AND [3] ONE OR MORE respiratory symptoms: cough, sore throat, runny or stuffy nose    Negative: Oxygen level (e.g., pulse oximetry) 91 to 94 percent    Negative: Fever present > 3 days (72 hours)    Negative: [1] Fever returns after gone for over 24 hours AND [2] symptoms worse or not improved    Negative: [1] Continuous (nonstop) coughing interferes with work or school AND [2] no improvement using cough treatment per Care Advice    Negative: [1] COVID-19 infection suspected by caller or triager AND [2] mild symptoms (cough, fever, or others) AND [3] negative COVID-19 rapid test    Negative: Cough present > 3 weeks    Negative: [1] COVID-19 diagnosed by positive lab test (e.g., PCR, rapid self-test kit) AND [2] NO symptoms (e.g., cough, fever, others)    Protocols used: CORONAVIRUS (COVID-19) DIAGNOSED OR [id number] 1.18.2022

## 2022-09-13 DIAGNOSIS — M06.9 RHEUMATOID ARTHRITIS, INVOLVING UNSPECIFIED SITE, UNSPECIFIED WHETHER RHEUMATOID FACTOR PRESENT (H): ICD-10-CM

## 2022-09-13 RX ORDER — GABAPENTIN 600 MG/1
TABLET ORAL
Qty: 90 TABLET | Refills: 3 | Status: SHIPPED | OUTPATIENT
Start: 2022-09-13 | End: 2023-08-31

## 2022-09-13 NOTE — TELEPHONE ENCOUNTER
Routing refill request to provider for review/approval because:  [place] [facility] [place]     Last Written Prescription Date:  4/6/22  Last Fill Quantity: 90,  # refills: 1   Last office visit provider:  12/30/21    Requested Prescriptions   Pending Prescriptions Disp Refills     gabapentin (NEURONTIN) 600 MG tablet [Pharmacy Med Name: GABAPENTIN TABS 600MG] 90 tablet 3     Sig: TAKE 1 TABLET AT BEDTIME (NEED TO BE SEEN FOR ANNUAL PHYSICAL/LAB BEFORE FURTHER REFILL, PLEASE MAKE APPOINTMENT)       There is no refill protocol information for this order          AMEENA PICHARDO RN 09/13/22 9:12 AM

## 2022-09-25 ENCOUNTER — HEALTH MAINTENANCE LETTER (OUTPATIENT)
Age: 72
End: 2022-09-25

## 2022-10-31 ENCOUNTER — NURSE TRIAGE (OUTPATIENT)
Dept: NURSING | Facility: CLINIC | Age: 72
End: 2022-10-31

## 2022-10-31 NOTE — TELEPHONE ENCOUNTER
"Patient is calling and states that on Friday October 28 th started with sneezing and a runny nose.  Patient states that this happened before and it was covid.  Yesterday tested for covid and is positive.  Symptoms are cough and congestion, headache, fatigue.  Denies triage.       Coronavirus (COVID-19) Notification    Caller Name (Patient, parent, daughter/son, grandparent, etc)  Lesly Hutchinson A    Reason for call  Notify of Positive Coronavirus (COVID-19) lab results, assess symptoms,  review [facility] recommendations    Lab Result    Lab test:  2019-nCoV rRt-PCR or SARS-CoV-2 PCR    Oropharyngeal AND/OR nasopharyngeal swabs is POSITIVE for 2019-nCoV RNA/SARS-COV-2 PCR (COVID-19 virus)    Brief introduction script  Introduce self then review script:  \"I am calling on behalf of [employer].  We were notified that your Coronavirus test (COVID-19) for was POSITIVE for the virus.\"    Gather patient reported symptoms   Assessment   Current Symptoms at time of phone call, reported by patient: (if no symptoms, document No symptoms] cough and congestion, headache, fatigue.      Date of Symptom(s) onset (if applicable) October 28, 2022     If at time of call, Patients symptoms hare worsened, the Patient should contact 911 or have someone drive them to Emergency Dept promptly:      If Patient calling 911, inform 911 personal that you have tested positive for the Coronavirus (COVID-19).  Place mask on and await 911 to arrive.    If Emergency Dept, If possible, please have another adult drive you to the Emergency Dept but you need to wear mask when in contact with other people.      Monoclonal Antibody Administration    You may be eligible to receive a new treatment with a monoclonal antibody for preventing hospitalization in patients at high risk for complications from COVID-19. This medication is still experimental and available on a limited basis; it is given through an IV and must be given at an infusion " center. Please note that not all people who are eligible will receive the medication since it is in limited supply.  Is the patient symptomatic and onset of symptoms within the last 7 days?  Yes  Is the patient interested in a visit with a provider to discuss treatment options?: No.  Reason patient declined:  Other: not serious    Review information with Patient    Your result was positive. This means you have COVID-19 (coronavirus).      How can I protect others?    These guidelines are for isolating before returning to work, school or .       If you DO have symptoms:  o Stay home and away from others  - For at least 5 days after your symptoms started, AND   - You are fever free for 24 hours (with no medicine that reduces fever), AND  - Your other symptoms are better.  o Wear a mask for 10 full days any time you are around others.    If you DON'T have symptoms:  o Stay at home and away from others for at least 5 days after your positive test.  o Wear a mask for 10 full days any time you are around others.    There may be different guidelines for healthcare facilities. Please check with the specific sites before arriving.     If you've been told by a doctor that you were severely ill with COVID-19 or are immunocompromised, you should isolate for at least 10 days.    You should not go back to work until you meet the guidelines above for ending your home isolation. You don't need to be retested for COVID-19 before going back to work--studies show that you won't spread the virus if it's been at least 10 days since your symptoms started (or 20 days, if you have a weak immune system).    Employers, schools, and daycares: This is an official notice for this person's medical guidelines for returning in-person. They must meet the above guidelines before going back to work, school, or  in person.    You will receive a positive COVID-19 letter via [employer] or the mail soon with additional self-care information.       Would you like me to review some of that information with you now?  No    If you were tested for an upcoming procedure, please contact your provider for next steps.     Luana Jackson RN    Reason for Disposition    Information only question and nurse able to answer    Additional Information    Negative: Nursing judgment    Negative: Nursing judgment    Negative: Nursing judgment    Negative: Nursing judgment    Protocols used: INFORMATION ONLY CALL - NO TRIAGE-A-[place]       English

## 2022-11-05 NOTE — TELEPHONE ENCOUNTER
RN cannot approve Refill Request    RN can NOT refill this medication Please link diagnosis for this medication. . Last office visit: 3/18/2019 Lilliam Jones MD Last Physical: 9/23/2019 Last MTM visit: Visit date not found Last visit same specialty: 3/18/2019 Lilliam Jones MD.  Next visit within 3 mo: Visit date not found  Next physical within 3 mo: Visit date not found      Tierra Mathur, [facility] Triage/Med Refill 11/12/2019    Requested Prescriptions   Pending Prescriptions Disp Refills     gabapentin (NEURONTIN) 600 MG tablet 90 tablet 3     Sig: Take 1 tablet (600 mg total) by mouth at bedtime.       Gabapentin/Levetiracetam/Tiagabine Refill Protocol  Passed - 11/12/2019  5:40 PM        Passed - PCP or prescribing provider visit in past 12 months or next 3 months     Last office visit with prescriber/PCP: 3/18/2019 Lilliam Jones MD OR same dept: 3/18/2019 Lilliam Jones MD OR same specialty: 3/18/2019 Lilliam Jones MD  Last physical: 9/23/2019 Last MTM visit: Visit date not found   Next visit within 3 mo: Visit date not found  Next physical within 3 mo: Visit date not found  Prescriber OR PCP: Lilliam Jones MD  Last diagnosis associated with med order: There are no diagnoses linked to this encounter.  If protocol passes may refill for 12 months if within 3 months of last provider visit (or a total of 15 months).                 Opt out

## 2022-12-11 ENCOUNTER — TRANSFERRED RECORDS (OUTPATIENT)
Dept: HEALTH INFORMATION MANAGEMENT | Facility: CLINIC | Age: 72
End: 2022-12-11

## 2022-12-13 DIAGNOSIS — E03.9 HYPOTHYROIDISM, UNSPECIFIED TYPE: ICD-10-CM

## 2022-12-14 RX ORDER — LEVOTHYROXINE SODIUM 50 UG/1
TABLET ORAL
Qty: 90 TABLET | Refills: 3 | Status: SHIPPED | OUTPATIENT
Start: 2022-12-14 | End: 2023-12-11

## 2022-12-14 NOTE — TELEPHONE ENCOUNTER
"Due to be seen with labs    Last Written Prescription Date:  6/17/22  Last Fill Quantity: 90,  # refills: 1   Last office visit provider:  12/30/21     Requested Prescriptions   Pending Prescriptions Disp Refills     levothyroxine (SYNTHROID/LEVOTHROID) 50 MCG tablet [Pharmacy Med Name: L-THYROXINE (SYNTHROID) TABS 50MCG] 90 tablet 3     Sig: TAKE 1 TABLET DAILY       Thyroid Protocol Passed - 12/13/2022 11:23 PM        Passed - Patient is 12 years or older        Passed - Recent (12 mo) or future (30 days) visit within the authorizing provider's specialty     Patient has had an office visit with the authorizing provider or a provider within the authorizing providers department within the previous 12 mos or has a future within next 30 days. See \"Patient Info\" tab in inbasket, or \"Choose Columns\" in Meds & Orders section of the refill encounter.              Passed - Medication is active on med list        Passed - Normal TSH on file in past 12 months     Recent Labs   Lab Test 12/30/21  1344   TSH 3.34              Passed - No active pregnancy on record     If patient is pregnant or has had a positive pregnancy test, please check TSH.          Passed - No positive pregnancy test in past 12 months     If patient is pregnant or has had a positive pregnancy test, please check TSH.               Sydney Panchal RN 12/14/22 11:57 AM  "

## 2023-01-24 ASSESSMENT — ENCOUNTER SYMPTOMS
JOINT SWELLING: 0
DIZZINESS: 0
ARTHRALGIAS: 1
SORE THROAT: 0
DYSURIA: 0
SHORTNESS OF BREATH: 1
HEARTBURN: 0
CONSTIPATION: 0
FEVER: 0
FREQUENCY: 0
PALPITATIONS: 0
ABDOMINAL PAIN: 0
COUGH: 0
MYALGIAS: 0
HEADACHES: 0
EYE PAIN: 0
CHILLS: 0
PARESTHESIAS: 0
BREAST MASS: 0
NERVOUS/ANXIOUS: 0
HEMATURIA: 0
NAUSEA: 0
HEMATOCHEZIA: 0
DIARRHEA: 0
WEAKNESS: 0

## 2023-01-24 ASSESSMENT — ACTIVITIES OF DAILY LIVING (ADL): CURRENT_FUNCTION: NO ASSISTANCE NEEDED

## 2023-01-27 ENCOUNTER — OFFICE VISIT (OUTPATIENT)
Dept: FAMILY MEDICINE | Facility: CLINIC | Age: 73
End: 2023-01-27
Payer: COMMERCIAL

## 2023-01-27 VITALS
RESPIRATION RATE: 20 BRPM | DIASTOLIC BLOOD PRESSURE: 78 MMHG | WEIGHT: 132.8 LBS | HEIGHT: 64 IN | SYSTOLIC BLOOD PRESSURE: 141 MMHG | HEART RATE: 86 BPM | BODY MASS INDEX: 22.67 KG/M2

## 2023-01-27 DIAGNOSIS — Z13.220 SCREENING FOR HYPERLIPIDEMIA: ICD-10-CM

## 2023-01-27 DIAGNOSIS — Z12.31 VISIT FOR SCREENING MAMMOGRAM: ICD-10-CM

## 2023-01-27 DIAGNOSIS — R42 LIGHTHEADEDNESS: ICD-10-CM

## 2023-01-27 DIAGNOSIS — M06.9 RHEUMATOID ARTHRITIS, INVOLVING UNSPECIFIED SITE, UNSPECIFIED WHETHER RHEUMATOID FACTOR PRESENT (H): ICD-10-CM

## 2023-01-27 DIAGNOSIS — Z00.00 ENCOUNTER FOR MEDICARE ANNUAL WELLNESS EXAM: Primary | ICD-10-CM

## 2023-01-27 DIAGNOSIS — R06.09 DYSPNEA ON EXERTION: ICD-10-CM

## 2023-01-27 DIAGNOSIS — E55.9 VITAMIN D DEFICIENCY: ICD-10-CM

## 2023-01-27 DIAGNOSIS — Z78.0 POSTMENOPAUSAL STATUS: ICD-10-CM

## 2023-01-27 DIAGNOSIS — M85.80 OSTEOPENIA WITH HIGH RISK OF FRACTURE: ICD-10-CM

## 2023-01-27 DIAGNOSIS — E03.9 HYPOTHYROIDISM, UNSPECIFIED TYPE: ICD-10-CM

## 2023-01-27 LAB
ALBUMIN SERPL BCG-MCNC: 4.5 G/DL (ref 3.5–5.2)
ALP SERPL-CCNC: 65 U/L (ref 35–104)
ALT SERPL W P-5'-P-CCNC: 18 U/L (ref 10–35)
ANION GAP SERPL CALCULATED.3IONS-SCNC: 14 MMOL/L (ref 7–15)
AST SERPL W P-5'-P-CCNC: 25 U/L (ref 10–35)
BILIRUB SERPL-MCNC: 0.5 MG/DL
BUN SERPL-MCNC: 20.8 MG/DL (ref 8–23)
CALCIUM SERPL-MCNC: 10 MG/DL (ref 8.8–10.2)
CHLORIDE SERPL-SCNC: 103 MMOL/L (ref 98–107)
CHOLEST SERPL-MCNC: 245 MG/DL
CREAT SERPL-MCNC: 0.84 MG/DL (ref 0.51–0.95)
DEPRECATED HCO3 PLAS-SCNC: 24 MMOL/L (ref 22–29)
ERYTHROCYTE [DISTWIDTH] IN BLOOD BY AUTOMATED COUNT: 12.5 % (ref 10–15)
GFR SERPL CREATININE-BSD FRML MDRD: 73 ML/MIN/1.73M2
GLUCOSE SERPL-MCNC: 122 MG/DL (ref 70–99)
HCT VFR BLD AUTO: 44.8 % (ref 35–47)
HDLC SERPL-MCNC: 112 MG/DL
HGB BLD-MCNC: 15.2 G/DL (ref 11.7–15.7)
LDLC SERPL CALC-MCNC: 120 MG/DL
MCH RBC QN AUTO: 31 PG (ref 26.5–33)
MCHC RBC AUTO-ENTMCNC: 33.9 G/DL (ref 31.5–36.5)
MCV RBC AUTO: 91 FL (ref 78–100)
NONHDLC SERPL-MCNC: 133 MG/DL
PLATELET # BLD AUTO: 269 10E3/UL (ref 150–450)
POTASSIUM SERPL-SCNC: 5 MMOL/L (ref 3.4–5.3)
PROT SERPL-MCNC: 7.9 G/DL (ref 6.4–8.3)
RBC # BLD AUTO: 4.91 10E6/UL (ref 3.8–5.2)
SODIUM SERPL-SCNC: 141 MMOL/L (ref 136–145)
TRIGL SERPL-MCNC: 66 MG/DL
TSH SERPL DL<=0.005 MIU/L-ACNC: 2.28 UIU/ML (ref 0.3–4.2)
WBC # BLD AUTO: 6 10E3/UL (ref 4–11)

## 2023-01-27 PROCEDURE — 93010 ELECTROCARDIOGRAM REPORT: CPT | Performed by: INTERNAL MEDICINE

## 2023-01-27 PROCEDURE — 93005 ELECTROCARDIOGRAM TRACING: CPT | Performed by: FAMILY MEDICINE

## 2023-01-27 PROCEDURE — 36415 COLL VENOUS BLD VENIPUNCTURE: CPT | Performed by: FAMILY MEDICINE

## 2023-01-27 PROCEDURE — 80061 LIPID PANEL: CPT | Performed by: FAMILY MEDICINE

## 2023-01-27 PROCEDURE — 82306 VITAMIN D 25 HYDROXY: CPT | Performed by: FAMILY MEDICINE

## 2023-01-27 PROCEDURE — 85027 COMPLETE CBC AUTOMATED: CPT | Performed by: FAMILY MEDICINE

## 2023-01-27 PROCEDURE — 99214 OFFICE O/P EST MOD 30 MIN: CPT | Mod: 25 | Performed by: FAMILY MEDICINE

## 2023-01-27 PROCEDURE — 80053 COMPREHEN METABOLIC PANEL: CPT | Performed by: FAMILY MEDICINE

## 2023-01-27 PROCEDURE — 84443 ASSAY THYROID STIM HORMONE: CPT | Performed by: FAMILY MEDICINE

## 2023-01-27 PROCEDURE — G0439 PPPS, SUBSEQ VISIT: HCPCS | Performed by: FAMILY MEDICINE

## 2023-01-27 RX ORDER — ALBUTEROL SULFATE 90 UG/1
2 AEROSOL, METERED RESPIRATORY (INHALATION) EVERY 6 HOURS PRN
Qty: 18 G | Refills: 0 | Status: SHIPPED | OUTPATIENT
Start: 2023-01-27 | End: 2024-03-08

## 2023-01-27 ASSESSMENT — ENCOUNTER SYMPTOMS
MYALGIAS: 0
WEAKNESS: 0
DYSURIA: 0
COUGH: 0
HEADACHES: 0
DIARRHEA: 0
EYE PAIN: 0
FREQUENCY: 0
DIZZINESS: 0
SORE THROAT: 0
NAUSEA: 0
NERVOUS/ANXIOUS: 0
CHILLS: 0
BREAST MASS: 0
PARESTHESIAS: 0
HEMATURIA: 0
JOINT SWELLING: 0
HEMATOCHEZIA: 0
SHORTNESS OF BREATH: 1
HEARTBURN: 0
ARTHRALGIAS: 1
CONSTIPATION: 0
FEVER: 0
PALPITATIONS: 0
ABDOMINAL PAIN: 0

## 2023-01-27 ASSESSMENT — ACTIVITIES OF DAILY LIVING (ADL): CURRENT_FUNCTION: NO ASSISTANCE NEEDED

## 2023-01-27 NOTE — PATIENT INSTRUCTIONS
In a few weeks give me an update on whether the albuterol inhaler helps with your shortness of breath.     Patient Education   Personalized Prevention Plan  You are due for the preventive services outlined below.  Your care team is available to assist you in scheduling these services.  If you have already completed any of these items, please share that information with your care team to update in your medical record.  Health Maintenance Due   Topic Date Due    Osteoporosis Screening  10/14/2021    ANNUAL REVIEW OF HM ORDERS  12/30/2022    Mammogram  02/22/2023       Exercise for a Healthier Heart  You may wonder how you can improve the health of your heart. If you re thinking about exercise, you re on the right track. You don t need to become an athlete. But you do need a certain amount of brisk exercise to help strengthen your heart. If you have been diagnosed with a heart condition, your healthcare provider may advise exercise to help stabilize your condition. To help make exercise a habit, choose safe, fun activities.      Exercise with a friend. When activity is fun, you're more likely to stick with it.   Before you start  Check with your healthcare provider before starting an exercise program. This is especially important if you have not been active for a while. It's also important if you have a long-term (chronic) health problem such as heart disease, diabetes, or obesity. Or if you are at high risk for having these problems.   Why exercise?  Exercising regularly offers many healthy rewards. It can help you do all of the following:   Improve your blood cholesterol level to help prevent further heart trouble  Lower your blood pressure to help prevent a stroke or heart attack  Control diabetes, or reduce your risk of getting this disease  Improve your heart and lung function  Reach and stay at a healthy weight  Make your muscles stronger so you can stay active  Prevent falls and fractures by slowing the loss of  bone mass (osteoporosis)  Manage stress better  Reduce your blood pressure  Improve your sense of self and your body image  Exercise tips    Ease into your routine. Set small goals. Then build on them. If you are not sure what your activity level should be, talk with your healthcare provider first before starting an exercise routine.  Exercise on most days. Aim for a total of 150 minutes (2 hours and 30 minutes) or more of moderate-intensity aerobic activity each week. Or 75 minutes (1 hour and 15 minutes) or more of vigorous-intensity aerobic activity each week. Or try for a combination of both. Moderate activity means that you breathe heavier and your heart rate increases but you can still talk. Think about doing 40 minutes of moderate exercise, 3 to 4 times a week. For best results, activity should last for about 40 minutes to lower blood pressure and cholesterol. It's OK to work up to the 40-minute period over time. Examples of moderate-intensity activity are walking 1 mile in 15 minutes. Or doing 30 to 45 minutes of yard work.  Step up your daily activity level.  Along with your exercise program, try being more active the whole day. Walk instead of drive. Or park further away so that you take more steps each day. Do more household tasks or yard work. You may not be able to meet the advised mount of physical activity. But doing some moderate- or vigorous-intensity aerobic activity can help reduce your risk for heart disease. Your healthcare provider can help you figure out what is best for you.  Choose 1 or more activities you enjoy.  Walking is one of the easiest things you can do. You can also try swimming, riding a bike, dancing, or taking an exercise class.    When to call your healthcare provider  Call your healthcare provider if you have any of these:   Chest pain or feel dizzy or lightheaded  Burning, tightness, pressure, or heaviness in your chest, neck, shoulders, back, or arms  Abnormal shortness of  breath  More joint or muscle pain  A very fast or irregular heartbeat (palpitations)  [employer] last reviewed this educational content on 7/1/2019 2000-2021 [employer]. All rights reserved. This information is not intended as a substitute for professional medical care. Always follow your healthcare professional's instructions.          Understanding USDA MyPlate  The USDA has guidelines to help you make healthy food choices. These are called MyPlate. MyPlate shows the food groups that make up healthy meals using the image of a place setting. Before you eat, think about the healthiest choices for what to put on your plate or in your cup or bowl. To learn more about building a healthy plate, visit www.choosemyplate.gov.    The food groups  Fruits. Any fruit or 100% fruit juice counts as part of the Fruit Group. Fruits may be fresh, canned, frozen, or dried, and may be whole, cut-up, or pureed. Make 1/2 of your plate fruits and vegetables.  Vegetables. Any vegetable or 100% vegetable juice counts as a member of the Vegetable Group. Vegetables may be fresh, frozen, canned, or dried. They can be served raw or cooked and may be whole, cut-up, or mashed. Make 1/2 of your plate fruits and vegetables.  Grains. All foods made from grains are part of the Grains Group. These include wheat, rice, oats, cornmeal, and barley. Grains are often used to make foods such as bread, pasta, oatmeal, cereal, tortillas, and grits. Grains should be no more than 1/4 of your plate. At least half of your grains should be whole grains.  Protein. This group includes meat, poultry, seafood, beans and peas, eggs, processed soy products (such as tofu), nuts (including nut butters), and seeds. Make protein choices no more than 1/4 of your plate. Meat and poultry choices should be lean or low fat.  Dairy. The Dairy Group includes all fluid milk products and foods made from milk that contain calcium, such as yogurt and cheese. (Foods  that have little calcium, such as cream, butter, and cream cheese, are not part of this group.) Most dairy choices should be low-fat or fat-free.  Oils. Oils aren't a food group, but they do contain essential nutrients. However it's important to watch your intake of oils. These are fats that are liquid at room temperature. They include canola, corn, olive, soybean, vegetable, and sunflower oil. Foods that are mainly oil include mayonnaise, certain salad dressings, and soft margarines. You likely already get your daily oil allowance from the foods you eat.  Things to limit  Eating healthy also means limiting these things in your diet:     Salt (sodium). Many processed foods have a lot of sodium. To keep sodium intake down, eat fresh vegetables, meats, poultry, and seafood when possible. Purchase low-sodium, reduced-sodium, or no-salt-added food products at the store. And don't add salt to your meals at home. Instead, season them with herbs and spices such as dill, oregano, cumin, and paprika. Or try adding flavor with lemon or lime zest and juice.  Saturated fat. Saturated fats are most often found in animal products such as beef, pork, and chicken. They are often solid at room temperature, such as butter. To reduce your saturated fat intake, choose leaner cuts of meat and poultry. And try healthier cooking methods such as grilling, broiling, roasting, or baking. For a simple lower-fat swap, use plain nonfat yogurt instead of mayonnaise when making potato salad or macaroni salad.  Added sugars. These are sugars added to foods. They are in foods such as ice cream, candy, soda, fruit drinks, sports drinks, energy drinks, cookies, pastries, jams, and syrups. Cut down on added sugars by sharing sweet treats with a family member or friend. You can also choose fruit for dessert, and drink water or other unsweetened beverages.     [employer] last reviewed this educational content on 6/1/2020 2000-2021 [employer]. All rights reserved. This information is not intended as a substitute for professional medical care. Always follow your healthcare professional's instructions.          Signs of Hearing Loss      Hearing much better with one ear can be a sign of hearing loss.   Hearing loss is a problem shared by many people. In fact, it is one of the most common health problems, particularly as people age. Most people age 65 and older have some hearing loss. By age 80, almost everyone does. Hearing loss often occurs slowly over the years. So you may not realize your hearing has gotten worse.  Have your hearing checked  Call your healthcare provider if you:  Have to strain to hear normal conversation  Have to watch other people s faces very carefully to follow what they re saying  Need to ask people to repeat what they ve said  Often misunderstand what people are saying  Turn the volume of the television or radio up so high that others complain  Feel that people are mumbling when they re talking to you  Find that the effort to hear leaves you feeling tired and irritated  Notice, when using the phone, that you hear better with one ear than the other  [employer] last reviewed this educational content on 1/1/2020 2000-2021 [employer]. All rights reserved. This information is not intended as a substitute for professional medical care. Always follow your healthcare professional's instructions.

## 2023-01-27 NOTE — PROGRESS NOTES
"SUBJECTIVE:   Lesly is a 72 year old who presents for Preventive Visit.    Patient has been advised of split billing requirements and indicates understanding: Yes     Are you in the first 12 months of your Medicare coverage?  No    Healthy Habits:     In general, how would you rate your overall health?  Good    Frequency of exercise:  None    Do you usually eat at least 4 servings of fruit and vegetables a day, include whole grains    & fiber and avoid regularly eating high fat or \"junk\" foods?  No    Taking medications regularly:  Yes    Medication side effects:  None    Ability to successfully perform activities of daily living:  No assistance needed    Home Safety:  Lack of grab bars in the bathroom    Hearing Impairment:  Feel that people are mumbling or not speaking clearly, need to ask people to speak up or repeat themselves and difficulty understanding soft or whispered speech    In the past 6 months, have you been bothered by leaking of urine?  No    In general, how would you rate your overall mental or emotional health?  Good      PHQ-2 Total Score: 0    Additional concerns today:  No    Have you ever done Advance Care Planning? (For example, a Health Directive, POLST, or a discussion with a medical provider or your loved ones about your wishes): No, advance care planning information given to patient to review.  Patient plans to discuss their wishes with loved ones or provider.      Fall risk  Fallen 2 or more times in the past year?: No  Any fall with injury in the past year?: No    Cognitive Screening   1) Repeat 3 items (Leader, Season, Table)    2) Clock draw: NORMAL  3) 3 item recall: Recalls 3 objects  Results: 3 items recalled: COGNITIVE IMPAIRMENT LESS LIKELY    Reviewed and updated as needed this visit by clinical staff   Tobacco  Allergies  Meds              Reviewed and updated as needed this visit by Provider                 Social History     Tobacco Use     Smoking status: Former     " Smokeless tobacco: Former     Tobacco comments:     quit 21yrs ago   Substance Use Topics     Alcohol use: Not on file       Alcohol Use 1/24/2023   Prescreen: >3 drinks/day or >7 drinks/week? No     SOB with walking around the mall and walking her dog and going up and downstairs. Improves with rest. No fam h/o asthma. Denies chest pain. No cough. No unintentionally weight loss. No SOB with riding her bike however. Does have lightheadedness when she's having SOB. She was a social smoker, 4 cigs/day, quit in 1994, started smoking when she was 16 years old. Smoking x 27 years. No fevers/chills.     Hypothyroidism Follow-up      Since last visit, patient describes the following symptoms: hair loss    Current providers sharing in care for this patient include:   Patient Care Team:  Lilliam Jones MD as PCP - General (Family Medicine)  Jack Summers PharmD as Pharmacist (Pharmacist)  Lilliam Jones MD as Assigned PCP    The following health maintenance items are reviewed in Epic and correct as of today:  Health Maintenance   Topic Date Due     DEXA  10/14/2021     ANNUAL REVIEW OF HM ORDERS  12/30/2022     MEDICARE ANNUAL WELLNESS VISIT  12/30/2022     MAMMO SCREENING  02/22/2023     FALL RISK ASSESSMENT  01/27/2024     COLORECTAL CANCER SCREENING  09/26/2024     LIPID  12/30/2026     ADVANCE CARE PLANNING  12/30/2026     DTAP/TDAP/TD IMMUNIZATION (3 - Td or Tdap) 01/24/2029     HEPATITIS C SCREENING  Completed     PHQ-2 (once per calendar year)  Completed     INFLUENZA VACCINE  Completed     Pneumococcal Vaccine: 65+ Years  Completed     ZOSTER IMMUNIZATION  Completed     COVID-19 Vaccine  Completed     IPV IMMUNIZATION  Aged Out     MENINGITIS IMMUNIZATION  Aged Out     LUNG CANCER SCREENING  Discontinued     Lab work is in process    Review of Systems   Constitutional: Negative for chills and fever.   HENT: Positive for hearing loss. Negative for congestion, ear pain and sore throat.    Eyes: Negative  "for pain and visual disturbance.   Respiratory: Positive for shortness of breath. Negative for cough.    Cardiovascular: Negative for chest pain, palpitations and peripheral edema.   Gastrointestinal: Negative for abdominal pain, constipation, diarrhea, heartburn, hematochezia and nausea.   Breasts:  Negative for tenderness, breast mass and discharge.   Genitourinary: Negative for dysuria, frequency, genital sores, hematuria, pelvic pain, urgency, vaginal bleeding and vaginal discharge.   Musculoskeletal: Positive for arthralgias. Negative for joint swelling and myalgias.   Skin: Negative for rash.   Neurological: Negative for dizziness, weakness, headaches and paresthesias.   Psychiatric/Behavioral: Negative for mood changes. The patient is not nervous/anxious.        OBJECTIVE:   BP (!) 141/78 (BP Location: Left arm, Patient Position: Sitting, Cuff Size: Adult Regular)   Pulse 86   Resp 20   Ht 1.626 m (5' 4\")   Wt 60.2 kg (132 lb 12.8 oz)   BMI 22.80 kg/m   Estimated body mass index is 22.8 kg/m  as calculated from the following:    Height as of this encounter: 1.626 m (5' 4\").    Weight as of this encounter: 60.2 kg (132 lb 12.8 oz).  Physical Exam  GENERAL APPEARANCE: healthy, alert and no distress  NECK: no adenopathy, no asymmetry, masses, or scars and thyroid normal to palpation  RESP: lungs clear to auscultation - no rales, rhonchi or wheezes  BREAST: normal without masses, tenderness or nipple discharge and no palpable axillary masses or adenopathy  CV: regular rate and rhythm, normal S1 S2, no S3 or S4, no murmur, click or rub  ABDOMEN: soft, nontender, no hepatosplenomegaly, no masses and bowel sounds normal  MS: no musculoskeletal defects are noted and gait is age appropriate without ataxia  SKIN: no suspicious lesions or rashes  NEURO: Normal strength and tone, sensory exam grossly normal, mentation intact and speech normal  PSYCH: mentation appears normal and affect normal/bright    Diagnostic " Test Results:  Labs reviewed in Epic    ASSESSMENT / PLAN:   Lesly was seen today for wellness visit.    Diagnoses and all orders for this visit:    Encounter for Medicare annual wellness exam  Routine history and physical, updated in EMR.  Density and bone scan.  Up-to-date with colonoscopy.  Return in a year for next annual physical  -     PRIMARY CARE FOLLOW-UP SCHEDULING; Future    Hypothyroidism, unspecified type  -     TSH with free T4 reflex; Future  -     TSH with free T4 reflex    Rheumatoid arthritis, involving unspecified site, unspecified whether rheumatoid factor present (H) - rheumatology doesn't think she has RA  Monitor for now    Vitamin D deficiency  -     Vitamin D Deficiency; Future  -     Vitamin D Deficiency    Dyspnea on exertion  Lightheadedness  Ongoing for sometimes.  EKG fine personally reviewed. Lab today. Will give her albuterol inhaler and she will update me in a few weeks how her symptoms are. Former light smoker but quit >20 years ago   -     Comprehensive metabolic panel (BMP + Alb, Alk Phos, ALT, AST, Total. Bili, TP); Future  -     CBC with platelets; Future  -     EKG 12-lead, tracing only  -     albuterol (PROAIR HFA/PROVENTIL HFA/VENTOLIN HFA) 108 (90 Base) MCG/ACT inhaler; Inhale 2 puffs into the lungs every 6 hours as needed for shortness of breath  -     Comprehensive metabolic panel (BMP + Alb, Alk Phos, ALT, AST, Total. Bili, TP)  -     CBC with platelets    Osteopenia with high risk of fracture  Postmenopausal status  -     DX Hip/Pelvis/Spine; Future    Screening for hyperlipidemia  -     Lipid panel; Future  -     Lipid panel    Visit for screening mammogram  -     MA Screen Bilateral w/Dat; Future      Patient has been advised of split billing requirements and indicates understanding: Yes      COUNSELING:  Reviewed preventive health counseling, as reflected in patient instructions       Regular exercise       Healthy diet/nutrition       Osteoporosis prevention/bone  health        She reports that she has quit smoking. She has quit using smokeless tobacco.      Appropriate preventive services were discussed with this patient, including applicable screening as appropriate for cardiovascular disease, diabetes, osteopenia/osteoporosis, and glaucoma.  As appropriate for age/gender, discussed screening for colorectal cancer, prostate cancer, breast cancer, and cervical cancer. Checklist reviewing preventive services available has been given to the patient.    Reviewed patients plan of care and provided an AVS. The Basic Care Plan (routine screening as documented in Health Maintenance) for Lesly meets the Care Plan requirement. This Care Plan has been established and reviewed with the Patient.      Lilliam Jones MD  [facility]        She is at risk for lack of exercise and has been provided with information to increase physical activity for the benefit of her well-being.  The patient was counseled and encouraged to consider modifying their diet and eating habits. She was provided with information on recommended healthy diet options.  The patient was provided with written information regarding signs of hearing loss.

## 2023-01-30 LAB
ATRIAL RATE - MUSE: 71 BPM
DEPRECATED CALCIDIOL+CALCIFEROL SERPL-MC: 55 UG/L (ref 20–75)
DIASTOLIC BLOOD PRESSURE - MUSE: NORMAL MMHG
INTERPRETATION ECG - MUSE: NORMAL
P AXIS - MUSE: 63 DEGREES
PR INTERVAL - MUSE: 200 MS
QRS DURATION - MUSE: 94 MS
QT - MUSE: 408 MS
QTC - MUSE: 443 MS
R AXIS - MUSE: 70 DEGREES
SYSTOLIC BLOOD PRESSURE - MUSE: NORMAL MMHG
T AXIS - MUSE: 57 DEGREES
VENTRICULAR RATE- MUSE: 71 BPM

## 2023-02-03 ENCOUNTER — TRANSFERRED RECORDS (OUTPATIENT)
Dept: HEALTH INFORMATION MANAGEMENT | Facility: CLINIC | Age: 73
End: 2023-02-03

## 2023-03-10 ENCOUNTER — ANCILLARY PROCEDURE (OUTPATIENT)
Dept: MAMMOGRAPHY | Facility: HOSPITAL | Age: 73
End: 2023-03-10
Attending: FAMILY MEDICINE
Payer: COMMERCIAL

## 2023-03-10 ENCOUNTER — HOSPITAL ENCOUNTER (OUTPATIENT)
Dept: BONE DENSITY | Facility: HOSPITAL | Age: 73
Discharge: HOME | End: 2023-03-10
Attending: FAMILY MEDICINE
Payer: COMMERCIAL

## 2023-03-10 DIAGNOSIS — M85.80 OSTEOPENIA WITH HIGH RISK OF FRACTURE: ICD-10-CM

## 2023-03-10 DIAGNOSIS — Z12.31 VISIT FOR SCREENING MAMMOGRAM: ICD-10-CM

## 2023-03-10 DIAGNOSIS — Z78.0 POSTMENOPAUSAL STATUS: ICD-10-CM

## 2023-03-10 PROCEDURE — 77080 DXA BONE DENSITY AXIAL: CPT

## 2023-03-10 PROCEDURE — 77067 SCR MAMMO BI INCL CAD: CPT

## 2023-03-14 ENCOUNTER — TRANSFERRED RECORDS (OUTPATIENT)
Dept: HEALTH INFORMATION MANAGEMENT | Facility: CLINIC | Age: 73
End: 2023-03-14

## 2023-03-14 DIAGNOSIS — M81.0 AGE-RELATED OSTEOPOROSIS WITHOUT CURRENT PATHOLOGICAL FRACTURE: Primary | ICD-10-CM

## 2023-05-19 ENCOUNTER — HOSPITAL ENCOUNTER (INPATIENT)
Facility: HOSPITAL | Age: 73
LOS: 5 days | Discharge: HOME HEALTH | DRG: 522 | End: 2023-05-24
Attending: EMERGENCY MEDICINE | Admitting: INTERNAL MEDICINE
Payer: COMMERCIAL

## 2023-05-19 ENCOUNTER — APPOINTMENT (OUTPATIENT)
Dept: CT IMAGING | Facility: HOSPITAL | Age: 73
DRG: 522 | End: 2023-05-19
Attending: ORTHOPAEDIC SURGERY
Payer: COMMERCIAL

## 2023-05-19 ENCOUNTER — TRANSFERRED RECORDS (OUTPATIENT)
Dept: HEALTH INFORMATION MANAGEMENT | Facility: CLINIC | Age: 73
End: 2023-05-19

## 2023-05-19 DIAGNOSIS — S72.002A HIP FRACTURE, LEFT, CLOSED, INITIAL ENCOUNTER (H): ICD-10-CM

## 2023-05-19 LAB
ALBUMIN SERPL BCG-MCNC: 4.7 G/DL (ref 3.5–5.2)
ALP SERPL-CCNC: 76 U/L (ref 35–104)
ALT SERPL W P-5'-P-CCNC: 25 U/L (ref 10–35)
ANION GAP SERPL CALCULATED.3IONS-SCNC: 15 MMOL/L (ref 7–15)
AST SERPL W P-5'-P-CCNC: 28 U/L (ref 10–35)
BASOPHILS # BLD AUTO: 0.1 10E3/UL (ref 0–0.2)
BASOPHILS NFR BLD AUTO: 0 %
BILIRUB SERPL-MCNC: 0.5 MG/DL
BUN SERPL-MCNC: 24.3 MG/DL (ref 8–23)
CALCIUM SERPL-MCNC: 9.9 MG/DL (ref 8.8–10.2)
CHLORIDE SERPL-SCNC: 99 MMOL/L (ref 98–107)
CREAT SERPL-MCNC: 0.84 MG/DL (ref 0.51–0.95)
DEPRECATED HCO3 PLAS-SCNC: 24 MMOL/L (ref 22–29)
EOSINOPHIL # BLD AUTO: 0 10E3/UL (ref 0–0.7)
EOSINOPHIL NFR BLD AUTO: 0 %
ERYTHROCYTE [DISTWIDTH] IN BLOOD BY AUTOMATED COUNT: 12.3 % (ref 10–15)
GFR SERPL CREATININE-BSD FRML MDRD: 73 ML/MIN/1.73M2
GLUCOSE SERPL-MCNC: 135 MG/DL (ref 70–99)
HCT VFR BLD AUTO: 46.6 % (ref 35–47)
HGB BLD-MCNC: 15.4 G/DL (ref 11.7–15.7)
IMM GRANULOCYTES # BLD: 0.1 10E3/UL
IMM GRANULOCYTES NFR BLD: 1 %
LYMPHOCYTES # BLD AUTO: 1.2 10E3/UL (ref 0.8–5.3)
LYMPHOCYTES NFR BLD AUTO: 7 %
MCH RBC QN AUTO: 30.7 PG (ref 26.5–33)
MCHC RBC AUTO-ENTMCNC: 33 G/DL (ref 31.5–36.5)
MCV RBC AUTO: 93 FL (ref 78–100)
MONOCYTES # BLD AUTO: 0.7 10E3/UL (ref 0–1.3)
MONOCYTES NFR BLD AUTO: 4 %
NEUTROPHILS # BLD AUTO: 15.5 10E3/UL (ref 1.6–8.3)
NEUTROPHILS NFR BLD AUTO: 88 %
NRBC # BLD AUTO: 0 10E3/UL
NRBC BLD AUTO-RTO: 0 /100
PLATELET # BLD AUTO: 246 10E3/UL (ref 150–450)
POTASSIUM SERPL-SCNC: 3.6 MMOL/L (ref 3.4–5.3)
PROT SERPL-MCNC: 8.3 G/DL (ref 6.4–8.3)
RBC # BLD AUTO: 5.02 10E6/UL (ref 3.8–5.2)
SODIUM SERPL-SCNC: 138 MMOL/L (ref 136–145)
WBC # BLD AUTO: 17.7 10E3/UL (ref 4–11)

## 2023-05-19 PROCEDURE — 85025 COMPLETE CBC W/AUTO DIFF WBC: CPT | Performed by: EMERGENCY MEDICINE

## 2023-05-19 PROCEDURE — 99222 1ST HOSP IP/OBS MODERATE 55: CPT | Performed by: INTERNAL MEDICINE

## 2023-05-19 PROCEDURE — 99285 EMERGENCY DEPT VISIT HI MDM: CPT | Mod: 25

## 2023-05-19 PROCEDURE — 36415 COLL VENOUS BLD VENIPUNCTURE: CPT | Performed by: EMERGENCY MEDICINE

## 2023-05-19 PROCEDURE — 120N000001 HC R&B MED SURG/OB

## 2023-05-19 PROCEDURE — 250N000013 HC RX MED GY IP 250 OP 250 PS 637: Performed by: INTERNAL MEDICINE

## 2023-05-19 PROCEDURE — 73700 CT LOWER EXTREMITY W/O DYE: CPT | Mod: LT

## 2023-05-19 PROCEDURE — 80053 COMPREHEN METABOLIC PANEL: CPT | Performed by: EMERGENCY MEDICINE

## 2023-05-19 RX ORDER — HYDRALAZINE HYDROCHLORIDE 20 MG/ML
10 INJECTION INTRAMUSCULAR; INTRAVENOUS EVERY 6 HOURS PRN
Status: DISCONTINUED | OUTPATIENT
Start: 2023-05-19 | End: 2023-05-24 | Stop reason: HOSPADM

## 2023-05-19 RX ORDER — GABAPENTIN 300 MG/1
600 CAPSULE ORAL AT BEDTIME
Status: DISCONTINUED | OUTPATIENT
Start: 2023-05-19 | End: 2023-05-24 | Stop reason: HOSPADM

## 2023-05-19 RX ORDER — MORPHINE SULFATE 4 MG/ML
4 INJECTION, SOLUTION INTRAMUSCULAR; INTRAVENOUS ONCE
Status: CANCELLED | OUTPATIENT
Start: 2023-05-19 | End: 2023-05-19

## 2023-05-19 RX ORDER — LEVOTHYROXINE SODIUM 25 UG/1
50 TABLET ORAL
Status: DISCONTINUED | OUTPATIENT
Start: 2023-05-20 | End: 2023-05-24 | Stop reason: HOSPADM

## 2023-05-19 RX ORDER — LIDOCAINE 40 MG/G
CREAM TOPICAL
Status: DISCONTINUED | OUTPATIENT
Start: 2023-05-19 | End: 2023-05-20

## 2023-05-19 RX ORDER — ACETAMINOPHEN 325 MG/1
975 TABLET ORAL 3 TIMES DAILY
Status: DISCONTINUED | OUTPATIENT
Start: 2023-05-19 | End: 2023-05-20

## 2023-05-19 RX ORDER — BISACODYL 5 MG
10 TABLET, DELAYED RELEASE (ENTERIC COATED) ORAL DAILY PRN
Status: DISCONTINUED | OUTPATIENT
Start: 2023-05-19 | End: 2023-05-24 | Stop reason: HOSPADM

## 2023-05-19 RX ORDER — IBUPROFEN 600 MG/1
600 TABLET, FILM COATED ORAL EVERY 6 HOURS PRN
Status: DISCONTINUED | OUTPATIENT
Start: 2023-05-19 | End: 2023-05-24 | Stop reason: HOSPADM

## 2023-05-19 RX ORDER — ONDANSETRON 2 MG/ML
4 INJECTION INTRAMUSCULAR; INTRAVENOUS ONCE
Status: CANCELLED | OUTPATIENT
Start: 2023-05-19 | End: 2023-05-19

## 2023-05-19 RX ORDER — ENOXAPARIN SODIUM 100 MG/ML
40 INJECTION SUBCUTANEOUS EVERY 24 HOURS
Status: DISCONTINUED | OUTPATIENT
Start: 2023-05-20 | End: 2023-05-22

## 2023-05-19 RX ORDER — BISACODYL 5 MG
5 TABLET, DELAYED RELEASE (ENTERIC COATED) ORAL DAILY PRN
Status: DISCONTINUED | OUTPATIENT
Start: 2023-05-19 | End: 2023-05-24 | Stop reason: HOSPADM

## 2023-05-19 RX ADMIN — OXYCODONE HYDROCHLORIDE 5 MG: 5 TABLET ORAL at 19:43

## 2023-05-19 RX ADMIN — ACETAMINOPHEN 975 MG: 325 TABLET ORAL at 19:43

## 2023-05-19 RX ADMIN — GABAPENTIN 600 MG: 300 CAPSULE ORAL at 21:25

## 2023-05-19 ASSESSMENT — ACTIVITIES OF DAILY LIVING (ADL)
ADLS_ACUITY_SCORE: 35
ADLS_ACUITY_SCORE: 35
DEPENDENT_IADLS:: INDEPENDENT
ADLS_ACUITY_SCORE: 35

## 2023-05-19 NOTE — ED TRIAGE NOTES
She fell about four hours ago. She landed on her left arm and hip on cement. Denies LOC. She states she tripped on the curb. She required assistance to get up. She went to [place] and xray revealed she had a broken hip. She has an abrasion to her left elbow.

## 2023-05-19 NOTE — H&P
[facility]    History and Physical - Hospitalist Service       Date of Admission:  5/19/2023    Assessment & Plan      Lesly Hutchinson is a 73 year old female admitted on 5/19/2023 acute left hip fracture.    Acute left hip fracture: 2/2 mechanical fall. CT hip with ubcapital fracture left femoral neck with minimal impaction but no significant angulation deformity.  -Pain control: Scheduled Tylenol, oxycodone as needed  -Nonweightbearing left lower extremity  -Per orthopedic surgery, plan surgical intervention tomorrow  -Patient reports no history of hypertension, diabetes, CKD, cardiac and pulmonary diseases.  She has good functional status.  She is medically optimized for the planned surgical intervention.  -Patient lives alone.  May need TCU placement.    Elevated blood pressure without the diagnosis of hypertension: Patient had elevated blood pressure up to 192/114 in ER due to anxiety.  She does not have history of hypertension.  -Hydralazine as needed.    Hypothyroidism: Continue PTA levothyroxine    History of rheumatoid arthritis: Patient reports that she takes ibuprofen as needed joint pain.       Diet:  Regular diet. NPO after midnight  DVT Prophylaxis: Enoxaparin (Lovenox) subcutaneous after surgery tomorrow  Donald Catheter: Not present  Lines: None     Cardiac Monitoring: None  Code Status:  Full code    Clinically Significant Risk Factors Present on Admission                                Disposition Plan      Expected Discharge Date: 05/21/2023                  Kenia Aponte MD  Hospitalist Service  [facility]  Securely message with [employer] (more info)  Text page via [employer] Paging/Directory     ______________________________________________________________________    Chief Complaint   Left hip pain after a fall    History is obtained from the patient    History of Present Illness   Lesly Hutchinson is a 73 year old female with history of hypothyroidism and  rheumatoid arthritis presents to ER for left hip pain after a fall.  Patient reports that this afternoon, she tripped on a curb in a shopping mall and fell.  She landed on her left hip.  After the accident, she developed left hip pain and was not able to stand up.  Bystanders helped her to call EMS.  Patient was sent to urgent care.  X-ray of the hip shows left hip fracture.  Patient was sent to ER for further evaluation. In ER, CT hip shows subcapital fracture left femoral neck with minimal impaction but no significant angulation deformity. Patient reports no chest pain, shortness of breath, and abdominal pain.       Past Medical History    Past Medical History:   Diagnosis Date     Rheumatoid arthritis (H)        Past Surgical History   Past Surgical History:   Procedure Laterality Date     ARTHROSCOPY KNEE Left 04/2016     FINGER GANGLION CYST EXCISION Right 10/10/2016    mucinous cyst     HC CORRECT BUNION,DOUBLE OSTEOTOMY      Description: Hallux Valgus (Bunion) Correction;  Recorded: 09/01/2009;     HC DILATION/CURETTAGE DIAG/THER NON OB      Description: Dilation And Curettage;  Recorded: 09/01/2009;     HC OSTEOTOMY METATARSAL, 1ST      Description: Metatarsal Osteotomy Of The First Metatarsal;  Recorded: 09/01/2009;     HYSTERECTOMY  2002?     OOPHORECTOMY Bilateral 2002?       Prior to Admission Medications   Prior to Admission Medications   Prescriptions Last Dose Informant Patient Reported? Taking?   albuterol (PROAIR HFA/PROVENTIL HFA/VENTOLIN HFA) 108 (90 Base) MCG/ACT inhaler   No No   Sig: Inhale 2 puffs into the lungs every 6 hours as needed for shortness of breath   calcium-vitamin D (CALCIUM-VITAMIN D) 500 mg(1,250mg) -200 unit per tablet   Yes No   Sig: [CALCIUM-VITAMIN D (CALCIUM-VITAMIN D) 500 MG(1,250MG) -200 UNIT PER TABLET] Take 1 tablet by mouth.   gabapentin (NEURONTIN) 600 MG tablet   No No   Sig: TAKE 1 TABLET AT BEDTIME (NEED TO BE SEEN FOR ANNUAL PHYSICAL/LAB BEFORE FURTHER REFILL,  PLEASE MAKE APPOINTMENT)   ibuprofen (ADVIL,MOTRIN) 600 MG tablet   Yes No   Sig: [IBUPROFEN (ADVIL,MOTRIN) 600 MG TABLET] Take 600 mg by mouth every 6 (six) hours as needed for pain.   levothyroxine (SYNTHROID/LEVOTHROID) 50 MCG tablet   No No   Sig: TAKE 1 TABLET DAILY   multivitamin capsule   Yes No   Sig: [MULTIVITAMIN CAPSULE] Take 1 capsule by mouth daily.      Facility-Administered Medications: None        Review of Systems    The 10 point Review of Systems is negative other than noted in the HPI or here.      Physical Exam   Vital Signs: Temp: 98.2  F (36.8  C) Temp src: Temporal BP: (!) 194/87 Pulse: 88   Resp: 12 SpO2: 97 %      Weight: 138 lbs 14.24 oz    General appearance: not in acute distress  HEENT: PERRL, EOMI  Lungs: Clear breath sounds in bilateral lung fields  Cardiovascular: Regular rate and rhythm, normal S1-S2  Abdomen: Soft, non tender, no distension, normal bowel sound  Musculoskeletal: No joint swelling. Left hip tenderness to palpation  Skin: No rash and no edema  Neurology: AAO ×3.  Cranial nerves II - XII normal.  Normal muscle strength in all four extremities.    Medical Decision Making       55 MINUTES SPENT BY ME on the date of service doing chart review, history, exam, documentation & further activities per the note.      Data     I have personally reviewed the following data over the past 24 hrs:    17.7 (H)  \   15.4   / 246     138 99 24.3 (H) /  135 (H)   3.6 24 0.84 \       ALT: 25 AST: 28 AP: 76 TBILI: 0.5   ALB: 4.7 TOT PROTEIN: 8.3 LIPASE: N/A       Imaging results reviewed over the past 24 hrs:   Recent Results (from the past 24 hour(s))   CT Hip Left w/o Contrast    Narrative    EXAM: CT HIP LEFT W/O CONTRAST  LOCATION: [facility]  DATE/TIME: 5/19/2023 6:21 PM CDT    INDICATION: Left femoral neck fracture. Surgical planning purposes.  COMPARISON: None.  TECHNIQUE: Noncontrast. Axial, sagittal and coronal thin-section reconstruction. Dose reduction  techniques were used.     FINDINGS:     BONES:  -Subcapital fracture left femoral neck. Minimal impaction. No evidence for intertrochanteric extension. Degenerative change at the left hip joint itself. No dislocation.    Degenerative change at the left SI joint. No additional fractures are identified within the visualized portion of the left hemipelvis.    SOFT TISSUES:  -Fatty infiltration and atrophy of the left gluteus minimus and medius. No evidence for significant effusion. No evidence for significant hematoma or fluid collection about the fracture.      Impression    IMPRESSION:  1.  Subcapital fracture left femoral neck with minimal impaction but no significant angulation deformity. No evidence for intertrochanteric extension.  2.  Degenerative change left hip joint.  3.  Visualized left hemipelvis negative for fracture.  4.  Degenerative change at the left SI joint.

## 2023-05-19 NOTE — PHARMACY-ADMISSION MEDICATION HISTORY
Pharmacist Admission Medication History    Admission medication history is complete. The information provided in this note is only as accurate as the sources available at the time of the update.    Medication reconciliation/reorder completed by provider prior to medication history? No    Information Source(s): Patient and CareEverywhere/SureScripts via in-person    Pertinent Information:     Changes made to PTA medication list:    Added: None    Deleted: None    Changed: None    Allergies reviewed with patient and updates made in EHR: yes    Medication History Completed By: Silvia Bruce PharmD 5/19/2023 6:45 PM    Prior to Admission medications    Medication Sig Last Dose Taking? Auth Provider Long Term End Date   albuterol (PROAIR HFA/PROVENTIL HFA/VENTOLIN HFA) 108 (90 Base) MCG/ACT inhaler Inhale 2 puffs into the lungs every 6 hours as needed for shortness of breath Unknown at prn Yes Lilliam Jones MD Yes    calcium-vitamin D (CALCIUM-VITAMIN D) 500 mg(1,250mg) -200 unit per tablet [CALCIUM-VITAMIN D (CALCIUM-VITAMIN D) 500 MG(1,250MG) -200 UNIT PER TABLET] Take 1 tablet by mouth. 5/18/2023 at pm Yes Provider, Historical     gabapentin (NEURONTIN) 600 MG tablet TAKE 1 TABLET AT BEDTIME (NEED TO BE SEEN FOR ANNUAL PHYSICAL/LAB BEFORE FURTHER REFILL, PLEASE MAKE APPOINTMENT) 5/18/2023 at hs Yes Lilliam Jones MD Yes    ibuprofen (ADVIL,MOTRIN) 600 MG tablet [IBUPROFEN (ADVIL,MOTRIN) 600 MG TABLET] Take 600 mg by mouth every 6 (six) hours as needed for pain. 5/19/2023 at 1400 Yes Provider, Historical     levothyroxine (SYNTHROID/LEVOTHROID) 50 MCG tablet TAKE 1 TABLET DAILY 5/19/2023 at am Yes Lilliam Jones MD Yes    multivitamin capsule [MULTIVITAMIN CAPSULE] Take 1 capsule by mouth daily. 5/18/2023 Yes Provider, Historical

## 2023-05-19 NOTE — ED NOTES
[facility] ED Handoff Report    ED Chief Complaint: Fall    ED Diagnosis:  (S72.002A) Hip fracture, left, closed, initial encounter (H)  Plan: Case Request: ARTHROPLASTY, HIP, TOTAL           PMH:    Past Medical History:   Diagnosis Date    Rheumatoid arthritis (H)         Code Status:  No Order     Falls Risk: Yes Band: Applied    Current Living Situation/Residence: lives alone and lives in a house     Elimination Status: Continent: Yes     Activity Level: Independent    Patients Preferred Language:  English     Needed: No    Vital Signs:  BP (!) 138/92   Pulse 90   Temp 98.2  F (36.8  C) (Temporal)   Resp 12   Wt 63 kg (138 lb 14.2 oz)   SpO2 98%   BMI 23.84 kg/m       Cardiac Rhythm: NA      Pain Score: 8/10    Is the Patient Confused:  No    Last Food or Drink: 05/19/23 at 2pm    Focused Assessment:  Alert and oriented. Tripped on a curb, fell. Left hip broken at femoral neck. Surgery in AM. NPO at midnight.     Tests Performed: Done: Labs and Imaging    Treatments Provided:  NA    Family Dynamics/Concerns: No    Family Updated On Visitor Policy: No    Plan of Care Communicated to Family: Yes    Who Was Updated about Plan of Care: sister, brother in law    Belongings Checklist Done and Signed by Patient: Yes    Medications sent with patient: NA     Covid: asymptomatic , NA    Additional Information:     RN: Rosy Beverly RN   5/19/2023 6:51 PM

## 2023-05-19 NOTE — PLAN OF CARE
ORTHO REMOTE NOTE    Aware of patient. Patient was seen in our urgent care after a fall today, found on x-rays to have a mildly displaced left femoral neck fracture.  She was referred to the emergency department for admission and subsequent surgical intervention.  I have ordered a CT scan of the left hip to be formed in the ED for surgical planning purposes, helping to make the determination between operative fixation and arthroplasty.    Request hospitalist admission for medical optimization and clearance for surgery.  N.p.o. after midnight for anticipated surgery tomorrow morning.  Nonweightbearing left lower extremity.  Bedrest.  Pain control as needed.    Formal consult to follow.    Jesus Pearson MD ................. 5/19/2023 5:48 PM  [facility]

## 2023-05-19 NOTE — ED NOTES
Expected Patient Referral to ED  5:34 PM    Referring Clinic/Provider:  [facility]    Reason for referral/Clinical facts:  Diagnosed at the clinic with a femoral neck fracture is being referred for admission    Recommendations provided:  Send to ED for further evaluation    Caller was informed that this institution does possess the capabilities and/or resources to provide for patient and should be transferred to our facility.    Discussed that if direct admit is sought and any hurdles are encountered, this ED would be happy to see the patient and evaluate.    Informed caller that recommendations provided are recommendations based only on the facts provided and that they responsible to accept or reject the advice, or to seek a formal in person consultation as needed and that this ED will see/treat patient should they arrive.      Grover Olvera DO  [facility] EMERGENCY DEPARTMENT  [facility] [facility] [id number] [phone]       Grover Olvera DO  05/19/23 [id number]

## 2023-05-19 NOTE — ED PROVIDER NOTES
EMERGENCY DEPARTMENT NOTE     Name: Lesly Hutchinson    Age/Sex: 73 year old female   MRN: [id number]   Evaluation Date & Time:  No admission date for patient encounter.    PCP:    Lilliam Jones   ED Provider: Grover Olvera D.O.       CHIEF COMPLAINT    Fall and Hip Pain       DIAGNOSIS & DISPOSITION     1. Hip fracture, left, closed, initial encounter (H)      DISPOSITION: [place] [facility]    At the conclusion of the encounter I discussed the results of all of the tests and the disposition. The questions were answered. The patient or family acknowledged understanding and was agreeable with the care plan.    TOTAL CRITICAL CARE TIME (EXCLUDING PROCEDURES): Not applicable    PROCEDURES:   None    EMERGENCY DEPARTMENT COURSE/MEDICAL DECISION MAKING   6:26 PM I met with the patient to gather history and to perform my initial exam.  We discussed treatment options and the plan for care while in the Emergency Department.  5:59 PM  I paged [facility]  6:06 PM I spoke with [facility].  6:00 PM I paged the hospitalist service.  6:27 PM I spoke with Dr. Aponte, hospitalist, to admit the patient.  Lesly Hutchinson is a 73 year old female with relevant past history of osteopenia with high risk for fracture and rheumatoid arthritis who presents to the emergency department for evaluation of a fall.         Triage note reviewed: Yes      Diagnostic studies:  Imaging:  CT Hip Left w/o Contrast   Final Result   IMPRESSION:   1.  Subcapital fracture left femoral neck with minimal impaction but no significant angulation deformity. No evidence for intertrochanteric extension.   2.  Degenerative change left hip joint.   3.  Visualized left hemipelvis negative for fracture.   4.  Degenerative change at the left SI joint.            Lab:  Labs Ordered and Resulted from Time of ED Arrival to Time of ED Departure   COMPREHENSIVE METABOLIC PANEL - Abnormal       Result Value    Sodium 138       Potassium 3.6      Chloride 99      Carbon Dioxide (CO2) 24      Anion Gap 15      Urea Nitrogen 24.3 (*)     Creatinine 0.84      Calcium 9.9      Glucose 135 (*)     Alkaline Phosphatase 76      AST 28      ALT 25      Protein Total 8.3      Albumin 4.7      Bilirubin Total 0.5      GFR Estimate 73     CBC WITH PLATELETS AND DIFFERENTIAL - Abnormal    WBC Count 17.7 (*)     RBC Count 5.02      Hemoglobin 15.4      Hematocrit 46.6      MCV 93      MCH 30.7      MCHC 33.0      RDW 12.3      Platelet Count 246      % Neutrophils 88      % Lymphocytes 7      % Monocytes 4      % Eosinophils 0      % Basophils 0      % Immature Granulocytes 1      NRBCs per 100 WBC 0      Absolute Neutrophils 15.5 (*)     Absolute Lymphocytes 1.2      Absolute Monocytes 0.7      Absolute Eosinophils 0.0      Absolute Basophils 0.1      Absolute Immature Granulocytes 0.1      Absolute NRBCs 0.0        Interventions: IV morphine  Discharge Vital Signs:BP (!) 204/84 (BP Location: Right arm)   Pulse 81   Temp 97.4  F (36.3  C) (Oral)   Resp 17   Wt 63 kg (138 lb 14.2 oz)   SpO2 92%   BMI 23.84 kg/m    Medical Decision Making  Patient denied head injury or neck pain.  Cervical spine nontender and just be adequate by Nexus criteria for low suspicion for cervical spine fracture without significant distracting pain and further evaluation with CT of cervical spine not pursued  Patient will be admitted, continue pain management, n.p.o. after midnight and orthopedic consultation in a.m.  Current findings and plan for mission discussed with the patient and her family and they are in agreement.  Case was discussed with orthopedics and the hospitalist service.  History:  Supplemental history from: Documented in chart, if applicable  External Record(s) reviewed: Outpatient office visit [facility] today    Work Up:  Chart documentation includes differential considered and any EKGs or imaging independently interpreted by provider, where  specified.  In additional to work up documented, I considered the following work up: Documented in chart, if applicable.    External consultation:  Discussion of management with another provider: Hospitalist, orthopedics    Complicating factors:  Care impacted by chronic illness: Rheumatoid arthritis, Ostopenia with high risk of fracture  Care affected by social determinants of health: N/A    Disposition considerations: Admit.      ED INTERVENTIONS     Medications   acetaminophen (TYLENOL) tablet 975 mg (975 mg Oral $Given 5/19/23 1943)   oxyCODONE IR (ROXICODONE) half-tab 2.5-5 mg (5 mg Oral $Given 5/19/23 1943)   hydrALAZINE (APRESOLINE) injection 10 mg (has no administration in time range)   gabapentin (NEURONTIN) capsule 600 mg (600 mg Oral $Given 5/19/23 2125)   ibuprofen (ADVIL/MOTRIN) tablet 600 mg (has no administration in time range)   levothyroxine (SYNTHROID/LEVOTHROID) tablet 50 mcg (has no administration in time range)   lidocaine 1 % 0.1-1 mL (has no administration in time range)   lidocaine (LMX4) cream (has no administration in time range)   sodium chloride (PF) 0.9% PF flush 3 mL (3 mLs Intracatheter $Given 5/19/23 2126)   sodium chloride (PF) 0.9% PF flush 3 mL (3 mLs Intracatheter $Given 5/19/23 2126)   melatonin tablet 1 mg (has no administration in time range)   enoxaparin ANTICOAGULANT (LOVENOX) injection 40 mg (has no administration in time range)   bisacodyl (DULCOLAX) EC tablet 5 mg (has no administration in time range)     Or   bisacodyl (DULCOLAX) EC tablet 10 mg (has no administration in time range)       DISCHARGE MEDICATIONS        Review of your medicines      UNREVIEWED medicines. Ask your doctor about these medicines      Dose / Directions   albuterol 108 (90 Base) MCG/ACT inhaler  Commonly known as: PROAIR HFA/PROVENTIL HFA/VENTOLIN HFA  Used for: Dyspnea on exertion, Lightheadedness      Dose: 2 puff  Inhale 2 puffs into the lungs every 6 hours as needed for shortness of  breath  Quantity: 18 g  Refills: 0     calcium carbonate-vitamin D 500-200 MG-UNIT tablet  Commonly known as: OSCAL w/D      Dose: 1 tablet  [CALCIUM-VITAMIN D (CALCIUM-VITAMIN D) 500 MG(1,250MG) -200 UNIT PER TABLET] Take 1 tablet by mouth.  Refills: 0     gabapentin 600 MG tablet  Commonly known as: NEURONTIN  Used for: Rheumatoid arthritis, involving unspecified site, unspecified whether rheumatoid factor present (H) - rheumatology doesn't think she has RA      TAKE 1 TABLET AT BEDTIME (NEED TO BE SEEN FOR ANNUAL PHYSICAL/LAB BEFORE FURTHER REFILL, PLEASE MAKE APPOINTMENT)  Quantity: 90 tablet  Refills: 3     ibuprofen 600 MG tablet  Commonly known as: ADVIL/MOTRIN      Dose: 600 mg  [IBUPROFEN (ADVIL,MOTRIN) 600 MG TABLET] Take 600 mg by mouth every 6 (six) hours as needed for pain.  Refills: 0     levothyroxine 50 MCG tablet  Commonly known as: SYNTHROID/LEVOTHROID  Used for: Hypothyroidism, unspecified type      TAKE 1 TABLET DAILY  Quantity: 90 tablet  Refills: 3            INFORMATION SOURCE AND LIMITATIONS    History/Exam limitations: None  Patient information was obtained from: Patient, Patient's Sister, and nurse triage note  Use of : N/A    HISTORY OF PRESENT ILLNESS   Lesly Hutchinson is a 73 year old year old female with a relevant past history of osteopenia and rheumatoid arthritis who presents to this ED for evaluation of a fall.    The patient reports that today (5/19) she had tripped and fell onto a cement curb. She had gone to [facility] for an xray which revealed a broken hip.    Per nurse triage note, she endorsed left hip pain after the fall and sustained an abrasion to her left elbow.    Per the patient's sister, she hadn't eaten and lives alone in [place]. No other complaints at this time.    REVIEW OF SYSTEMS:   All other systems reviewed and are negative except as noted above in HPI.    PATIENT HISTORY     Past Medical History:   Diagnosis Date     Rheumatoid arthritis (H)       Patient Active Problem List   Diagnosis     Benign Adenomatous Polyp Of The Large Intestine     Menopause     Osteopenia with high risk of fracture     Hypothyroidism     Primary osteoarthritis of both knees     Primary osteoarthritis involving multiple joints     Wears hearing aid in both ears     Elevated BP without diagnosis of hypertension     Hyperlipidemia LDL goal <100 ascvd risk 17%     Hip fracture, left, closed, initial encounter (H)     Past Surgical History:   Procedure Laterality Date     ARTHROSCOPY KNEE Left 04/2016     FINGER GANGLION CYST EXCISION Right 10/10/2016    mucinous cyst     HC CORRECT BUNION,DOUBLE OSTEOTOMY      Description: Hallux Valgus (Bunion) Correction;  Recorded: 09/01/2009;     HC DILATION/CURETTAGE DIAG/THER NON OB      Description: Dilation And Curettage;  Recorded: 09/01/2009;     HC OSTEOTOMY METATARSAL, 1ST      Description: Metatarsal Osteotomy Of The First Metatarsal;  Recorded: 09/01/2009;     HYSTERECTOMY  2002?     OOPHORECTOMY Bilateral 2002?       Allergies   Allergen Reactions     Sulfa (Sulfonamide Antibiotics) [Sulfa Antibiotics] Unknown       OUTPATIENT MEDICATIONS     Current Discharge Medication List         Vitals:    05/19/23 1825 05/19/23 1845 05/19/23 1932 05/19/23 2320   BP: (!) 138/92  (!) 192/114 (!) 204/84   BP Location:   Left arm Right arm   Patient Position:   Sitting    Pulse: 100 90 90 81   Resp:   16 17   Temp:   98.1  F (36.7  C) 97.4  F (36.3  C)   TempSrc:   Oral Oral   SpO2: 98% 98% 98% 92%   Weight:           Physical Exam   Constitutional: Oriented to person, place, and time. Appears well-developed and well-nourished.   HEENT:    Head: Atraumatic.   Neck: Normal range of motion. Neck supple.  No cervical spine tenderness  Cardiovascular: Normal rate, regular rhythm and normal heart sounds.    Pulmonary/Chest: Normal effort  and breath sounds normal.   Abdominal: Soft. Bowel sounds are normal.   Musculoskeletal: Left hip  tenderness.  Neurological: Alert and oriented to person, place, and time. Normal strength. No sensory deficit. No cranial nerve deficit.  Skin: Skin is warm and dry.   Psychiatric: Normal mood and affect. Behavior is normal. Thought content normal.     DIAGNOSTICS    LABORATORY FINDINGS (REVIEWED AND INTERPRETED):  Labs Ordered and Resulted from Time of ED Arrival to Time of ED Departure   COMPREHENSIVE METABOLIC PANEL - Abnormal       Result Value    Sodium 138      Potassium 3.6      Chloride 99      Carbon Dioxide (CO2) 24      Anion Gap 15      Urea Nitrogen 24.3 (*)     Creatinine 0.84      Calcium 9.9      Glucose 135 (*)     Alkaline Phosphatase 76      AST 28      ALT 25      Protein Total 8.3      Albumin 4.7      Bilirubin Total 0.5      GFR Estimate 73     CBC WITH PLATELETS AND DIFFERENTIAL - Abnormal    WBC Count 17.7 (*)     RBC Count 5.02      Hemoglobin 15.4      Hematocrit 46.6      MCV 93      MCH 30.7      MCHC 33.0      RDW 12.3      Platelet Count 246      % Neutrophils 88      % Lymphocytes 7      % Monocytes 4      % Eosinophils 0      % Basophils 0      % Immature Granulocytes 1      NRBCs per 100 WBC 0      Absolute Neutrophils 15.5 (*)     Absolute Lymphocytes 1.2      Absolute Monocytes 0.7      Absolute Eosinophils 0.0      Absolute Basophils 0.1      Absolute Immature Granulocytes 0.1      Absolute NRBCs 0.0           IMAGING (REVIEWED AND INTERPRETED):  CT Hip Left w/o Contrast   Final Result   IMPRESSION:   1.  Subcapital fracture left femoral neck with minimal impaction but no significant angulation deformity. No evidence for intertrochanteric extension.   2.  Degenerative change left hip joint.   3.  Visualized left hemipelvis negative for fracture.   4.  Degenerative change at the left SI joint.               Scott PATHAK , am serving as a scribe to document services personally performed by Grover Olvera D.O., based on my observation and the provider s statements to  me.    I, Grover Olvera D.O., attest that Scott Pilibrendan  is acting in a scribe capacity, has observed my performance of the services and has documented them in accordance with my direction.    Grover Olvera D.O.  EMERGENCY MEDICINE   05/19/23  [facility] EMERGENCY DEPARTMENT  [address] [place] [id number]  [phone]  Dept: [phone]     Grover Olvera,   05/20/23 0107

## 2023-05-20 ENCOUNTER — ANCILLARY PROCEDURE (OUTPATIENT)
Dept: ULTRASOUND IMAGING | Facility: HOSPITAL | Age: 73
End: 2023-05-20
Attending: ANESTHESIOLOGY
Payer: COMMERCIAL

## 2023-05-20 ENCOUNTER — ANESTHESIA (OUTPATIENT)
Dept: SURGERY | Facility: HOSPITAL | Age: 73
DRG: 522 | End: 2023-05-20
Payer: COMMERCIAL

## 2023-05-20 ENCOUNTER — APPOINTMENT (OUTPATIENT)
Dept: RADIOLOGY | Facility: HOSPITAL | Age: 73
DRG: 522 | End: 2023-05-20
Attending: ORTHOPAEDIC SURGERY
Payer: COMMERCIAL

## 2023-05-20 ENCOUNTER — ANESTHESIA EVENT (OUTPATIENT)
Dept: SURGERY | Facility: HOSPITAL | Age: 73
DRG: 522 | End: 2023-05-20
Payer: COMMERCIAL

## 2023-05-20 PROCEDURE — 370N000017 HC ANESTHESIA TECHNICAL FEE, PER MIN: Performed by: ORTHOPAEDIC SURGERY

## 2023-05-20 PROCEDURE — 250N000013 HC RX MED GY IP 250 OP 250 PS 637: Performed by: INTERNAL MEDICINE

## 2023-05-20 PROCEDURE — 250N000011 HC RX IP 250 OP 636: Performed by: NURSE ANESTHETIST, CERTIFIED REGISTERED

## 2023-05-20 PROCEDURE — 250N000011 HC RX IP 250 OP 636: Performed by: ORTHOPAEDIC SURGERY

## 2023-05-20 PROCEDURE — 250N000013 HC RX MED GY IP 250 OP 250 PS 637: Performed by: ORTHOPAEDIC SURGERY

## 2023-05-20 PROCEDURE — 0SRB04A REPLACEMENT OF LEFT HIP JOINT WITH CERAMIC ON POLYETHYLENE SYNTHETIC SUBSTITUTE, UNCEMENTED, OPEN APPROACH: ICD-10-PCS | Performed by: ORTHOPAEDIC SURGERY

## 2023-05-20 PROCEDURE — P9045 ALBUMIN (HUMAN), 5%, 250 ML: HCPCS | Performed by: NURSE ANESTHETIST, CERTIFIED REGISTERED

## 2023-05-20 PROCEDURE — 360N000077 HC SURGERY LEVEL 4, PER MIN: Performed by: ORTHOPAEDIC SURGERY

## 2023-05-20 PROCEDURE — 250N000009 HC RX 250: Performed by: ANESTHESIOLOGY

## 2023-05-20 PROCEDURE — 250N000011 HC RX IP 250 OP 636: Performed by: ANESTHESIOLOGY

## 2023-05-20 PROCEDURE — 250N000009 HC RX 250: Performed by: ORTHOPAEDIC SURGERY

## 2023-05-20 PROCEDURE — 258N000001 HC RX 258: Performed by: ORTHOPAEDIC SURGERY

## 2023-05-20 PROCEDURE — 99232 SBSQ HOSP IP/OBS MODERATE 35: CPT | Performed by: INTERNAL MEDICINE

## 2023-05-20 PROCEDURE — 258N000003 HC RX IP 258 OP 636: Performed by: ANESTHESIOLOGY

## 2023-05-20 PROCEDURE — 710N000009 HC RECOVERY PHASE 1, LEVEL 1, PER MIN: Performed by: ORTHOPAEDIC SURGERY

## 2023-05-20 PROCEDURE — C1776 JOINT DEVICE (IMPLANTABLE): HCPCS | Performed by: ORTHOPAEDIC SURGERY

## 2023-05-20 PROCEDURE — 999N000141 HC STATISTIC PRE-PROCEDURE NURSING ASSESSMENT: Performed by: ORTHOPAEDIC SURGERY

## 2023-05-20 PROCEDURE — C1713 ANCHOR/SCREW BN/BN,TIS/BN: HCPCS | Performed by: ORTHOPAEDIC SURGERY

## 2023-05-20 PROCEDURE — 120N000001 HC R&B MED SURG/OB

## 2023-05-20 PROCEDURE — 250N000011 HC RX IP 250 OP 636: Performed by: INTERNAL MEDICINE

## 2023-05-20 PROCEDURE — 258N000003 HC RX IP 258 OP 636: Performed by: ORTHOPAEDIC SURGERY

## 2023-05-20 PROCEDURE — 272N000001 HC OR GENERAL SUPPLY STERILE: Performed by: ORTHOPAEDIC SURGERY

## 2023-05-20 PROCEDURE — 258N000003 HC RX IP 258 OP 636: Performed by: NURSE ANESTHETIST, CERTIFIED REGISTERED

## 2023-05-20 PROCEDURE — 999N000065 XR PELVIS AND HIP PORTABLE LEFT 1 VIEW

## 2023-05-20 DEVICE — 6.5MM LOW PROFILE HEX SCREW 30MM
Type: IMPLANTABLE DEVICE | Site: HIP | Status: FUNCTIONAL
Brand: TRIDENT II

## 2023-05-20 DEVICE — CERAMIC V40 FEMORAL HEAD
Type: IMPLANTABLE DEVICE | Site: HIP | Status: FUNCTIONAL
Brand: BIOLOX

## 2023-05-20 DEVICE — IMP CABLE SYN ORTHO COCR W/CRIMP 1.7X750MM 611.105.01S: Type: IMPLANTABLE DEVICE | Site: HIP | Status: FUNCTIONAL

## 2023-05-20 DEVICE — TRIDENT X3 0 DEGREE POLYETHYLENE INSERT
Type: IMPLANTABLE DEVICE | Site: HIP | Status: FUNCTIONAL
Brand: TRIDENT X3 INSERT

## 2023-05-20 DEVICE — HIP STEM - HIGH OFFSET
Type: IMPLANTABLE DEVICE | Site: HIP | Status: FUNCTIONAL
Brand: INSIGNIA

## 2023-05-20 DEVICE — TRIDENT II TRITANIUM CLUSTER 48D
Type: IMPLANTABLE DEVICE | Site: HIP | Status: FUNCTIONAL
Brand: TRIDENT II

## 2023-05-20 RX ORDER — ACETAMINOPHEN 325 MG/1
650 TABLET ORAL EVERY 4 HOURS PRN
Status: DISCONTINUED | OUTPATIENT
Start: 2023-05-23 | End: 2023-05-24 | Stop reason: HOSPADM

## 2023-05-20 RX ORDER — NALOXONE HYDROCHLORIDE 0.4 MG/ML
0.4 INJECTION, SOLUTION INTRAMUSCULAR; INTRAVENOUS; SUBCUTANEOUS
Status: DISCONTINUED | OUTPATIENT
Start: 2023-05-20 | End: 2023-05-24 | Stop reason: HOSPADM

## 2023-05-20 RX ORDER — LIDOCAINE 40 MG/G
CREAM TOPICAL
Status: DISCONTINUED | OUTPATIENT
Start: 2023-05-20 | End: 2023-05-20 | Stop reason: HOSPADM

## 2023-05-20 RX ORDER — BISACODYL 10 MG
10 SUPPOSITORY, RECTAL RECTAL DAILY PRN
Status: DISCONTINUED | OUTPATIENT
Start: 2023-05-20 | End: 2023-05-23

## 2023-05-20 RX ORDER — HYDROMORPHONE HYDROCHLORIDE 1 MG/ML
0.2 INJECTION, SOLUTION INTRAMUSCULAR; INTRAVENOUS; SUBCUTANEOUS
Status: DISCONTINUED | OUTPATIENT
Start: 2023-05-20 | End: 2023-05-24 | Stop reason: HOSPADM

## 2023-05-20 RX ORDER — FENTANYL CITRATE 50 UG/ML
25-100 INJECTION, SOLUTION INTRAMUSCULAR; INTRAVENOUS
Status: DISCONTINUED | OUTPATIENT
Start: 2023-05-20 | End: 2023-05-20 | Stop reason: HOSPADM

## 2023-05-20 RX ORDER — FENTANYL CITRATE 50 UG/ML
50 INJECTION, SOLUTION INTRAMUSCULAR; INTRAVENOUS EVERY 5 MIN PRN
Status: DISCONTINUED | OUTPATIENT
Start: 2023-05-20 | End: 2023-05-20 | Stop reason: HOSPADM

## 2023-05-20 RX ORDER — FENTANYL CITRATE 0.05 MG/ML
INJECTION, SOLUTION INTRAMUSCULAR; INTRAVENOUS PRN
Status: DISCONTINUED | OUTPATIENT
Start: 2023-05-20 | End: 2023-05-20

## 2023-05-20 RX ORDER — ONDANSETRON 2 MG/ML
INJECTION INTRAMUSCULAR; INTRAVENOUS PRN
Status: DISCONTINUED | OUTPATIENT
Start: 2023-05-20 | End: 2023-05-20

## 2023-05-20 RX ORDER — ONDANSETRON 4 MG/1
4 TABLET, ORALLY DISINTEGRATING ORAL EVERY 6 HOURS PRN
Status: DISCONTINUED | OUTPATIENT
Start: 2023-05-20 | End: 2023-05-24 | Stop reason: HOSPADM

## 2023-05-20 RX ORDER — HYDROMORPHONE HYDROCHLORIDE 1 MG/ML
0.2 INJECTION, SOLUTION INTRAMUSCULAR; INTRAVENOUS; SUBCUTANEOUS EVERY 5 MIN PRN
Status: DISCONTINUED | OUTPATIENT
Start: 2023-05-20 | End: 2023-05-20 | Stop reason: HOSPADM

## 2023-05-20 RX ORDER — TRANEXAMIC ACID 650 MG/1
1950 TABLET ORAL ONCE
Status: COMPLETED | OUTPATIENT
Start: 2023-05-20 | End: 2023-05-20

## 2023-05-20 RX ORDER — CEFADROXIL 500 MG/1
500 CAPSULE ORAL 2 TIMES DAILY
Qty: 28 CAPSULE | Refills: 0 | Status: SHIPPED | OUTPATIENT
Start: 2023-05-20 | End: 2024-03-08

## 2023-05-20 RX ORDER — NALOXONE HYDROCHLORIDE 0.4 MG/ML
0.2 INJECTION, SOLUTION INTRAMUSCULAR; INTRAVENOUS; SUBCUTANEOUS
Status: DISCONTINUED | OUTPATIENT
Start: 2023-05-20 | End: 2023-05-24 | Stop reason: HOSPADM

## 2023-05-20 RX ORDER — OXYCODONE HYDROCHLORIDE 5 MG/1
5 TABLET ORAL EVERY 4 HOURS PRN
Status: DISCONTINUED | OUTPATIENT
Start: 2023-05-20 | End: 2023-05-24 | Stop reason: HOSPADM

## 2023-05-20 RX ORDER — ONDANSETRON 2 MG/ML
4 INJECTION INTRAMUSCULAR; INTRAVENOUS EVERY 6 HOURS PRN
Status: DISCONTINUED | OUTPATIENT
Start: 2023-05-20 | End: 2023-05-24 | Stop reason: HOSPADM

## 2023-05-20 RX ORDER — METHOCARBAMOL 500 MG/1
500 TABLET, FILM COATED ORAL EVERY 6 HOURS PRN
Status: DISCONTINUED | OUTPATIENT
Start: 2023-05-20 | End: 2023-05-24 | Stop reason: HOSPADM

## 2023-05-20 RX ORDER — ACETAMINOPHEN 325 MG/1
975 TABLET ORAL EVERY 8 HOURS
Status: COMPLETED | OUTPATIENT
Start: 2023-05-20 | End: 2023-05-23

## 2023-05-20 RX ORDER — HYDROMORPHONE HYDROCHLORIDE 1 MG/ML
0.4 INJECTION, SOLUTION INTRAMUSCULAR; INTRAVENOUS; SUBCUTANEOUS
Status: DISCONTINUED | OUTPATIENT
Start: 2023-05-20 | End: 2023-05-24 | Stop reason: HOSPADM

## 2023-05-20 RX ORDER — ASPIRIN 81 MG/1
81 TABLET ORAL 2 TIMES DAILY
Status: DISCONTINUED | OUTPATIENT
Start: 2023-05-20 | End: 2023-05-24 | Stop reason: HOSPADM

## 2023-05-20 RX ORDER — VANCOMYCIN HYDROCHLORIDE 1 G/20ML
INJECTION, POWDER, LYOPHILIZED, FOR SOLUTION INTRAVENOUS PRN
Status: DISCONTINUED | OUTPATIENT
Start: 2023-05-20 | End: 2023-05-20 | Stop reason: HOSPADM

## 2023-05-20 RX ORDER — DEXAMETHASONE SODIUM PHOSPHATE 10 MG/ML
INJECTION, SOLUTION INTRAMUSCULAR; INTRAVENOUS PRN
Status: DISCONTINUED | OUTPATIENT
Start: 2023-05-20 | End: 2023-05-20

## 2023-05-20 RX ORDER — PROPOFOL 10 MG/ML
INJECTION, EMULSION INTRAVENOUS CONTINUOUS PRN
Status: DISCONTINUED | OUTPATIENT
Start: 2023-05-20 | End: 2023-05-20

## 2023-05-20 RX ORDER — CEFAZOLIN SODIUM 1 G/3ML
1 INJECTION, POWDER, FOR SOLUTION INTRAMUSCULAR; INTRAVENOUS EVERY 8 HOURS
Status: COMPLETED | OUTPATIENT
Start: 2023-05-20 | End: 2023-05-21

## 2023-05-20 RX ORDER — PROPOFOL 10 MG/ML
INJECTION, EMULSION INTRAVENOUS PRN
Status: DISCONTINUED | OUTPATIENT
Start: 2023-05-20 | End: 2023-05-20

## 2023-05-20 RX ORDER — CEPHALEXIN 500 MG/1
500 CAPSULE ORAL EVERY 6 HOURS SCHEDULED
Status: DISCONTINUED | OUTPATIENT
Start: 2023-05-21 | End: 2023-05-24 | Stop reason: HOSPADM

## 2023-05-20 RX ORDER — PROCHLORPERAZINE MALEATE 5 MG
5 TABLET ORAL EVERY 6 HOURS PRN
Status: DISCONTINUED | OUTPATIENT
Start: 2023-05-20 | End: 2023-05-24 | Stop reason: HOSPADM

## 2023-05-20 RX ORDER — BUPIVACAINE HYDROCHLORIDE 5 MG/ML
INJECTION, SOLUTION EPIDURAL; INTRACAUDAL
Status: COMPLETED | OUTPATIENT
Start: 2023-05-20 | End: 2023-05-20

## 2023-05-20 RX ORDER — CEFAZOLIN SODIUM/WATER 2 G/20 ML
2 SYRINGE (ML) INTRAVENOUS SEE ADMIN INSTRUCTIONS
Status: DISCONTINUED | OUTPATIENT
Start: 2023-05-20 | End: 2023-05-20 | Stop reason: HOSPADM

## 2023-05-20 RX ORDER — AMOXICILLIN 250 MG
1 CAPSULE ORAL 2 TIMES DAILY
Status: DISCONTINUED | OUTPATIENT
Start: 2023-05-20 | End: 2023-05-24 | Stop reason: HOSPADM

## 2023-05-20 RX ORDER — OXYCODONE HYDROCHLORIDE 5 MG/1
10 TABLET ORAL EVERY 4 HOURS PRN
Status: DISCONTINUED | OUTPATIENT
Start: 2023-05-20 | End: 2023-05-24 | Stop reason: HOSPADM

## 2023-05-20 RX ORDER — MAGNESIUM HYDROXIDE 1200 MG/15ML
LIQUID ORAL PRN
Status: DISCONTINUED | OUTPATIENT
Start: 2023-05-20 | End: 2023-05-20 | Stop reason: HOSPADM

## 2023-05-20 RX ORDER — KETOROLAC TROMETHAMINE 30 MG/ML
15 INJECTION, SOLUTION INTRAMUSCULAR; INTRAVENOUS EVERY 6 HOURS
Status: COMPLETED | OUTPATIENT
Start: 2023-05-20 | End: 2023-05-21

## 2023-05-20 RX ORDER — SODIUM CHLORIDE, SODIUM LACTATE, POTASSIUM CHLORIDE, CALCIUM CHLORIDE 600; 310; 30; 20 MG/100ML; MG/100ML; MG/100ML; MG/100ML
INJECTION, SOLUTION INTRAVENOUS CONTINUOUS
Status: DISCONTINUED | OUTPATIENT
Start: 2023-05-20 | End: 2023-05-20 | Stop reason: HOSPADM

## 2023-05-20 RX ORDER — SODIUM CHLORIDE, SODIUM LACTATE, POTASSIUM CHLORIDE, CALCIUM CHLORIDE 600; 310; 30; 20 MG/100ML; MG/100ML; MG/100ML; MG/100ML
INJECTION, SOLUTION INTRAVENOUS CONTINUOUS
Status: DISCONTINUED | OUTPATIENT
Start: 2023-05-20 | End: 2023-05-22

## 2023-05-20 RX ORDER — HALOPERIDOL 5 MG/ML
1 INJECTION INTRAMUSCULAR
Status: DISCONTINUED | OUTPATIENT
Start: 2023-05-20 | End: 2023-05-20 | Stop reason: HOSPADM

## 2023-05-20 RX ORDER — POLYETHYLENE GLYCOL 3350 17 G/17G
17 POWDER, FOR SOLUTION ORAL DAILY
Status: DISCONTINUED | OUTPATIENT
Start: 2023-05-21 | End: 2023-05-23

## 2023-05-20 RX ORDER — LIDOCAINE 40 MG/G
CREAM TOPICAL
Status: DISCONTINUED | OUTPATIENT
Start: 2023-05-20 | End: 2023-05-24 | Stop reason: HOSPADM

## 2023-05-20 RX ORDER — HYDROXYZINE HYDROCHLORIDE 10 MG/1
10 TABLET, FILM COATED ORAL EVERY 6 HOURS PRN
Status: DISCONTINUED | OUTPATIENT
Start: 2023-05-20 | End: 2023-05-24 | Stop reason: HOSPADM

## 2023-05-20 RX ORDER — ONDANSETRON 2 MG/ML
4 INJECTION INTRAMUSCULAR; INTRAVENOUS EVERY 30 MIN PRN
Status: DISCONTINUED | OUTPATIENT
Start: 2023-05-20 | End: 2023-05-20 | Stop reason: HOSPADM

## 2023-05-20 RX ORDER — ONDANSETRON 4 MG/1
4 TABLET, ORALLY DISINTEGRATING ORAL EVERY 30 MIN PRN
Status: DISCONTINUED | OUTPATIENT
Start: 2023-05-20 | End: 2023-05-20 | Stop reason: HOSPADM

## 2023-05-20 RX ORDER — HYDROMORPHONE HYDROCHLORIDE 1 MG/ML
0.4 INJECTION, SOLUTION INTRAMUSCULAR; INTRAVENOUS; SUBCUTANEOUS EVERY 5 MIN PRN
Status: DISCONTINUED | OUTPATIENT
Start: 2023-05-20 | End: 2023-05-20 | Stop reason: HOSPADM

## 2023-05-20 RX ORDER — CEFAZOLIN SODIUM/WATER 2 G/20 ML
2 SYRINGE (ML) INTRAVENOUS
Status: COMPLETED | OUTPATIENT
Start: 2023-05-20 | End: 2023-05-20

## 2023-05-20 RX ORDER — FENTANYL CITRATE 50 UG/ML
25 INJECTION, SOLUTION INTRAMUSCULAR; INTRAVENOUS EVERY 5 MIN PRN
Status: DISCONTINUED | OUTPATIENT
Start: 2023-05-20 | End: 2023-05-20 | Stop reason: HOSPADM

## 2023-05-20 RX ADMIN — GABAPENTIN 600 MG: 300 CAPSULE ORAL at 21:10

## 2023-05-20 RX ADMIN — PROPOFOL 30 MG: 10 INJECTION, EMULSION INTRAVENOUS at 10:48

## 2023-05-20 RX ADMIN — MIDAZOLAM 1 MG: 1 INJECTION INTRAMUSCULAR; INTRAVENOUS at 10:30

## 2023-05-20 RX ADMIN — ENOXAPARIN SODIUM 40 MG: 40 INJECTION SUBCUTANEOUS at 21:09

## 2023-05-20 RX ADMIN — Medication 2 G: at 10:45

## 2023-05-20 RX ADMIN — SODIUM CHLORIDE, POTASSIUM CHLORIDE, SODIUM LACTATE AND CALCIUM CHLORIDE: 600; 310; 30; 20 INJECTION, SOLUTION INTRAVENOUS at 10:38

## 2023-05-20 RX ADMIN — ACETAMINOPHEN 975 MG: 325 TABLET ORAL at 08:36

## 2023-05-20 RX ADMIN — MIDAZOLAM 1 MG: 1 INJECTION INTRAMUSCULAR; INTRAVENOUS at 10:48

## 2023-05-20 RX ADMIN — TRANEXAMIC ACID 1950 MG: 650 TABLET ORAL at 10:43

## 2023-05-20 RX ADMIN — PHENYLEPHRINE HYDROCHLORIDE 100 MCG: 10 INJECTION INTRAVENOUS at 11:00

## 2023-05-20 RX ADMIN — PROPOFOL 75 MCG/KG/MIN: 10 INJECTION, EMULSION INTRAVENOUS at 10:52

## 2023-05-20 RX ADMIN — SODIUM CHLORIDE, POTASSIUM CHLORIDE, SODIUM LACTATE AND CALCIUM CHLORIDE: 600; 310; 30; 20 INJECTION, SOLUTION INTRAVENOUS at 12:32

## 2023-05-20 RX ADMIN — MEPIVACAINE HYDROCHLORIDE 20 ML: 15 INJECTION, SOLUTION EPIDURAL; INFILTRATION at 10:30

## 2023-05-20 RX ADMIN — CEFAZOLIN 1 G: 1 INJECTION, POWDER, FOR SOLUTION INTRAMUSCULAR; INTRAVENOUS at 20:10

## 2023-05-20 RX ADMIN — BUPIVACAINE HYDROCHLORIDE 2.6 ML: 5 INJECTION, SOLUTION EPIDURAL; INTRACAUDAL; PERINEURAL at 10:49

## 2023-05-20 RX ADMIN — KETOROLAC TROMETHAMINE 15 MG: 30 INJECTION, SOLUTION INTRAMUSCULAR; INTRAVENOUS at 21:09

## 2023-05-20 RX ADMIN — PHENYLEPHRINE HYDROCHLORIDE 0.4 MCG/KG/MIN: 10 INJECTION INTRAVENOUS at 11:01

## 2023-05-20 RX ADMIN — SODIUM CHLORIDE, POTASSIUM CHLORIDE, SODIUM LACTATE AND CALCIUM CHLORIDE: 600; 310; 30; 20 INJECTION, SOLUTION INTRAVENOUS at 10:29

## 2023-05-20 RX ADMIN — ACETAMINOPHEN 975 MG: 325 TABLET ORAL at 15:17

## 2023-05-20 RX ADMIN — Medication 1 MG: at 01:08

## 2023-05-20 RX ADMIN — ASPIRIN 81 MG: 81 TABLET, COATED ORAL at 21:10

## 2023-05-20 RX ADMIN — FENTANYL CITRATE 50 MCG: 50 INJECTION INTRAVENOUS at 10:30

## 2023-05-20 RX ADMIN — DEXAMETHASONE SODIUM PHOSPHATE 10 MG: 10 INJECTION, SOLUTION INTRAMUSCULAR; INTRAVENOUS at 11:00

## 2023-05-20 RX ADMIN — FENTANYL CITRATE 50 MCG: 50 INJECTION INTRAVENOUS at 10:48

## 2023-05-20 RX ADMIN — ONDANSETRON 4 MG: 2 INJECTION INTRAMUSCULAR; INTRAVENOUS at 12:40

## 2023-05-20 RX ADMIN — SENNOSIDES AND DOCUSATE SODIUM 1 TABLET: 50; 8.6 TABLET ORAL at 21:10

## 2023-05-20 RX ADMIN — KETOROLAC TROMETHAMINE 15 MG: 30 INJECTION, SOLUTION INTRAMUSCULAR; INTRAVENOUS at 15:18

## 2023-05-20 RX ADMIN — ALBUMIN HUMAN: 0.05 INJECTION, SOLUTION INTRAVENOUS at 11:40

## 2023-05-20 RX ADMIN — LEVOTHYROXINE SODIUM 50 MCG: 0.03 TABLET ORAL at 06:48

## 2023-05-20 RX ADMIN — OXYCODONE HYDROCHLORIDE 5 MG: 5 TABLET ORAL at 01:08

## 2023-05-20 ASSESSMENT — ACTIVITIES OF DAILY LIVING (ADL)
ADLS_ACUITY_SCORE: 41
ADLS_ACUITY_SCORE: 35
ADLS_ACUITY_SCORE: 41
ADLS_ACUITY_SCORE: 35
ADLS_ACUITY_SCORE: 35
ADLS_ACUITY_SCORE: 41
ADLS_ACUITY_SCORE: 41
ADLS_ACUITY_SCORE: 35

## 2023-05-20 NOTE — ANESTHESIA CARE TRANSFER NOTE
Patient: Lesly Hutchinson    Procedure: Procedure(s):  ARTHROPLASTY, HIP, TOTAL, LEFT       Diagnosis: Hip fracture, left, closed, initial encounter (H) [S72.002A]  Diagnosis Additional Information: No value filed.    Anesthesia Type:   Spinal     Note:    Oropharynx: oropharynx clear of all foreign objects  Level of Consciousness: awake  Oxygen Supplementation: face mask  Level of Supplemental Oxygen (L/min / FiO2): 6  Independent Airway: airway patency satisfactory and stable  Dentition: dentition unchanged  Vital Signs Stable: post-procedure vital signs reviewed and stable  Report to RN Given: handoff report given  Patient transferred to: PACU    Handoff Report: Identifed the Patient, Identified the Reponsible Provider, Reviewed the pertinent medical history, Discussed the surgical course, Reviewed Intra-OP anesthesia mangement and issues during anesthesia, Set expectations for post-procedure period and Allowed opportunity for questions and acknowledgement of understanding      Vitals:  Vitals Value Taken Time   /59 05/20/23 1321   Temp 36.6  C (97.8  F) 05/20/23 1321   Pulse 78 05/20/23 1321   Resp 20 05/20/23 1321   SpO2 100 % 05/20/23 1321       Electronically Signed By: SISSY Lamb CRNA  May 20, 2023  1:22 PM

## 2023-05-20 NOTE — PROGRESS NOTES
Care Management Follow Up    Length of Stay (days): 1    Expected Discharge Date: 05/22/2023     Concerns to be Addressed:     Patient to have surgery today. Will await therapy evaluations and recommendations for discharge needs.  Patient plan of care discussed at interdisciplinary rounds: Yes    Anticipated Discharge Disposition:  To be determined once evaluated by therapy post-operatively     Anticipated Discharge Services:  To be determined - possible need for TCU  Anticipated Discharge DME:  As per therapy recommendations    Patient/family educated on Medicare website which has current facility and service quality ratings:    Education Provided on the Discharge Plan:    Patient/Family in Agreement with the Plan:      Referrals Placed by CM/SW:  None as of yet  Private pay costs discussed: Not applicable    Additional Information:  Patient is an active 72 yo female who normally works and lives independently. She fell and sustained a hip fracture which will be repaired today at 1100. CM to await therapy evaluations and recommendations for discharge.     VIKY AustinSW

## 2023-05-20 NOTE — PLAN OF CARE
"  Problem: Plan of Care - These are the overarching goals to be used throughout the patient stay.    Goal: Plan of Care Review  Description: The Plan of Care Review/Shift note should be completed every shift.  The Outcome Evaluation is a brief statement about your assessment that the patient is improving, declining, or no change.  This information will be displayed automatically on your shift note.  Outcome: Progressing  Goal: Patient-Specific Goal (Individualized)  Description: You can add care plan individualizations to a care plan. Examples of Individualization might be:  \"Parent requests to be called daily at 9am for status\", \"I have a hard time hearing out of my right ear\", or \"Do not touch me to wake me up as it startles me\".  Outcome: Progressing  Goal: Absence of Hospital-Acquired Illness or Injury  Outcome: Progressing  Intervention: Identify and Manage Fall Risk  Recent Flowsheet Documentation  Taken 5/20/2023 0108 by Brandy Gray, RN  Safety Promotion/Fall Prevention: activity supervised  Intervention: Prevent Skin Injury  Recent Flowsheet Documentation  Taken 5/20/2023 0108 by Brandy Gray, RN  Body Position: position maintained  Goal: Optimal Comfort and Wellbeing  Outcome: Progressing  Intervention: Monitor Pain and Promote Comfort  Recent Flowsheet Documentation  Taken 5/20/2023 0108 by Brandy Gray, RN  Pain Management Interventions: medication (see MAR)  Goal: Readiness for Transition of Care  Outcome: Progressing   Goal Outcome Evaluation:       Oxycodone x 1 for left hip pain 5/10. Cms+. NPO for surgery at 1100 am. Bp elevated 204/84. Recheck after pain medication 131/60.                 "

## 2023-05-20 NOTE — OP NOTE
Operative Report    PATIENT Lesly Hutchinson   DATE OF SURGERY:  5/19/2023 - 5/20/2023    PREOPERATIVE DIAGNOSIS   Hip fracture, left, closed, initial encounter (H) [S72.002A].    POSTOPERATIVE DIAGNOSIS   Hip fracture, left, closed, initial encounter (H) [S72.002A].    PROCEDURE PERFORMED   Left posterior approach total hip arthroplasty.    IMPLANTS  Implant Name Type Inv. Item Serial No.  Lot No. LRB No. Used Action   IMP CABLE SYN ORTHO COCR W/CRIMP [id number] 611.105.01S - [id number] Metallic Hardware/[place] IMP CABLE SYN ORTHO COCR W/CRIMP [id number] 611.105.01S 611.105.01S [employer] [id number] Left 1 Implanted   TRIDENT II TRITANIUM CLUSTERHOLE 48D - [id number] Total Joint Component/Insert TRIDENT II TRITANIUM CLUSTERHOLE 48D 702-04-48D [employer] [id number] Left 1 Implanted   INSERT ACE 36MM 0DEG X3 723-00-36D - [id number] Total Joint Component/Insert INSERT ACE 36MM 0DEG X3 723-00-36D 723-00-36D [employer] [id number] Left 1 Implanted   IMP SCR STRK LOW PROFILE HEX 6.5X30MM 1409-3180 - -[id number] Metallic Hardware/[place] IMP SCR STRK LOW PROFILE HEX 6.5X30MM [id number] [employer] U9S Left 1 Implanted   IMPLANT HIP STM 105MM NK 38.5MM INSG 5 HI STM HI OFST STRL - -4729 Total Joint Component/Insert IMPLANT HIP STM 105MM NK 38.5MM INSG 5 HI STM HI OFST STRL 0221-5497 [employer] [id number] Left 1 Implanted   IMP HEAD FEMORAL STRK BIOLOX DELTA CERAMIC 36MM +0MM - -5-961 Total Joint Component/Insert IMP HEAD FEMORAL STRK BIOLOX DELTA CERAMIC 36MM +0MM [id number] [employer] [id number] Left 1 Implanted     SURGEON  Willem Ohara MD MD    ASSISTANT   Tim Barger PA-C; assistant was required for patient positioning, surgical assistance, wound closure and monitoring patient's safety throughout the case.    ANESTHESIA  Spinal      FINDINGS:  1.  Subcapital femoral neck fracture.  2.  Osteopenia.    SPECIMENS:  none    ESTIMATED BLOOD LOSS:  300  cc    COMPLICATIONS   None.      INDICATION FOR PROCEDURE  Lesly Hutchinson is a 73 year old female who presented to the [facility] emergency room after being seen in [facility] orthopedics urgent care.  She was noted to have a left femoral neck fracture after a fall directly onto her left side.  At baseline, she is an active female.  She still works as an , likes to walk and ride a bike.  Given her relatively young age as well as activity level, surgical intervention was discussed.  I discussed both percutaneous cannulated screw fixation as well as total hip arthroplasty again given her young age and high activity level.  We discussed the risks and benefits, pros and cons of both procedures.  Ultimately, utilizing shared decision making, we elected to move forward with a left total hip arthroplasty.    PREOPERATIVE EXAMINATION:   Skin overlying the left hip is intact.    INFORMED CONSENT  Lesly Hutchinson was identified in the preoperative holding area and was identified using medical record number, name, and date of birth, all of which were confirmed. The operative extremity was marked using an indelible marker. Once again, all risks and benefits as well as alternatives to surgical intervention were discussed with the patient in detail and all their questions were answered. Risks discussed included but were not limited to: instability, leg length discrepancy, infection, wound healing issues, persistent pain, bleeding, scarring, stiffness, thromboembolic events, fracture, malalignment/malrotation, implant complications, severe limb dysfunction, loss of limb, and loss of life. The patient signed informed consent and wished to proceed with surgery as scheduled.     DESCRIPTION OF PROCEDURE   Lesly Hutchinson was brought back to the operating room.  Spinal anesthesia was achieved without difficulty.  [facility] lateral decubitus position,  with the left side up.  The left lower extremity was then prepped and draped in the usual sterile fashion.    A timeout was performed correct identifying the patient, procedure, operative extremity, administration of antibiotics as well as TXA.  Following this, I utilized a standard posterior approach to the hip.  The IT band was divided.  Posterior capsular structures were taken down 1 continuous sleeve for later repair.  I made note of the location of the sciatic nerve and took special precaution to protect it throughout the case.    The hip was rotated up and out.  The femoral neck fracture was noted.  Fracture hematoma was evacuated.  I made a provisional neck cut at roughly 15 mm above the lesser trochanter and remove the bone fragments.  The femoral head was removed from the acetabulum.    Given her relatively young age and high activity level, I did wish to press-fit her hip if at all possible.  She did have some osteopenia, but this did not feel inappropriate.  To be safe against further periprosthetic fracture, I wanted to pass a prophylactic cable.  This was done inferior to the vastus ridge but superior to the lesser trochanter, being sure to keep the tip of the cable passer on bone the entire time.  The cable was passed, tensioned, crimped and the tail was cut.    I then turned my attention to broaching.  I broached up to a size 5 insignia stem with good fit, fill, rotational stability.  I planed the neck.  At this point, I turned my attention to the acetabulum.    The acetabulum was appropriately exposed, and the circumferential soft tissue was cleaned.  Pulmonary tissue was resected.  The femoral head head measured about 46 to 47 mm.  As such, I began reaming with a 46 mm reamer, followed by 48 mm reamer which had good fit and on completion of reaming I had good bleeding cancellous bone.  A 48 mm cluster hole Trident 2 acetabular component was then impacted in an appropriate position in terms of both  anteversion and inclination.  This was ensured to be down through the central hole.  Trial liner was then placed.    Attention was then turned to trialing.  The broach was already in the femur.  High offset neck was placed along with a 0 mm neck length 36 mm femoral head.  The hip was reduced.  Length felt clinically appropriate, without a significant kick which would indicate over lengthening.  At 90 degrees of flexion, I had internal rotation stability to at least 70 degrees before impingement and dislocation.  I was unable to dislocate anteriorly with extension and external rotation.  As such, I was happy with the stability exam.    The hip was then dislocated.  Trial head and neck were removed.  The acetabulum was exposed.  Trial liner removed.  Acetabular component thoroughly irrigated.  A single screw was placed in the superior posterior quadrant with excellent purchase.  Final 36 mm inner diameter neutral liner was then impacted ensured to be fully seated.  At this point, the proximal femur was exposed and I removed the broach.  I inspected very closely the calcar for fractures and did not appreciate any.  A size 5 insignia high offset stem was then impacted down into the same version.  I again inspected for fractures and did not appreciate any.  A final 0 mm ceramic femoral head was placed onto a clean and dry trunnion, the hip was reduced, and stability exam was unchanged.    The hip was thoroughly irrigated.  Local injection was utilized throughout.  I again felt the sciatic nerve which had been protected and was without over tensioning.  Serial Irrisept soaks were performed.  1 g of vancomycin powder was placed against the implants.  Posterior capsule was closed with a #5 Ethibond.  IT band was closed with a #5 Ethibond followed by running #1 strata fix.  Subcutaneous fat layer was closed with an 0 Vicryl followed by 2-0 Vicryl followed by running 3-0 Monocryl followed by skin glue.  Mepilex dressing was  placed.    She tolerated procedure well and was taken recovery in stable condition.    POSTOPERATIVE PLAN:  -Pain control  -PT/OT, WBAT, posterior hip precautions  -24 hours IV antibiotics, continue p.o. Keflex while inpatient, discharged on 2 weeks of oral cefadroxil  -ASA for DVT ppx  -X-rays to be obtained in PACU    Willem Ohara MD  [facility]

## 2023-05-20 NOTE — CONSULTS
"Care Management Initial Consult    General Information  Assessment completed with: Patient, Lesly  Type of CM/SW Visit: Initial Assessment    Primary Care Provider verified and updated as needed: Yes   Readmission within the last 30 days: no previous admission in last 30 days      Reason for Consult: discharge planning  Advance Care Planning: Advance Care Planning Reviewed: no concerns identified          Communication Assessment  Patient's communication style: spoken language (English or Bilingual)             Cognitive  Cognitive/Neuro/Behavioral:                        Living Environment:   People in home: alone     Current living Arrangements: house (\"I have 1 step into the house from the garage. Then it is a split entry house. I have to use the steps upstairs for the bedroom and bathroom. Downstairs would be for the laundry\".)      Able to return to prior arrangements: other (see comments) (may need TCU)       Family/Social Support:  Care provided by: self  Provides care for: no one  Marital Status:   Sibling(s), Children          Description of Support System: Supportive, Involved    Support Assessment: Adequate family and caregiver support, Adequate social supports, Patient communicates needs well met    Current Resources:   Patient receiving home care services: No     Community Resources: None  Equipment currently used at home: none  Supplies currently used at home: Hearing Aid Batteries, Other (\"glasses\")    Employment/Financial:  Employment Status: retired     Employment/ Comments: \"no  benefits\"  Financial Concerns:     Referral to Financial Worker: No       Does the patient's insurance plan have a 3 day qualifying hospital stay waiver?  No    Lifestyle & Psychosocial Needs:  Social Determinants of Health     Tobacco Use: Medium Risk (1/27/2023)    Patient History      Smoking Tobacco Use: Former      Smokeless Tobacco Use: Former      Passive Exposure: Not on file   Alcohol Use: " "Not on file   Financial Resource Strain: Not on file   Food Insecurity: Not on file   Transportation Needs: Not on file   Physical Activity: Not on file   Stress: Not on file   Social Connections: Not on file   Intimate Partner Violence: Not on file   Depression: Not at risk (1/27/2023)    PHQ-2      PHQ-2 Score: 0   Housing Stability: Not on file       Functional Status:  Prior to admission patient needed assistance:   Dependent ADLs:: Independent, Ambulation-no assistive device  Dependent IADLs:: Independent  Assesssment of Functional Status: Not at baseline with ADL Functioning, Not at baseline with mobility, Not at  functional baseline    Mental Health Status:          Chemical Dependency Status:                Values/Beliefs:  Spiritual, Cultural Beliefs, Worship Practices, Values that affect care:                 Additional Information:  Lesly lives in a house alone. \"I have 1 step into the house from the garage. Then it is a split entry house. I have to use the steps upstairs for the bedroom and bathroom. Downstairs would be for the laundry\".    She is independent with ADLs and IADLs. She still drives.    She may need a TCU depending on surgery plan and PT/OT recommendations. She wants to stay in this area of town around the hospital if possible since her daughter lives near here.    [facility] transport at discharge.    CM to follow for medical progression of care, discharge recommendations, and final discharge plan.    Hattie Rome RN      "

## 2023-05-20 NOTE — ANESTHESIA PREPROCEDURE EVALUATION
Anesthesia Pre-Procedure Evaluation    Patient: Lesly Hutchinson   MRN: [id number] : 1950        Procedure : Procedure(s):  ARTHROPLASTY, HIP, TOTAL, LEFT          Past Medical History:   Diagnosis Date     Rheumatoid arthritis (H)       Past Surgical History:   Procedure Laterality Date     ARTHROSCOPY KNEE Left 2016     FINGER GANGLION CYST EXCISION Right 10/10/2016    mucinous cyst     HC CORRECT BUNION,DOUBLE OSTEOTOMY      Description: Hallux Valgus (Bunion) Correction;  Recorded: 2009;     HC DILATION/CURETTAGE DIAG/THER NON OB      Description: Dilation And Curettage;  Recorded: 2009;     HC OSTEOTOMY METATARSAL, 1ST      Description: Metatarsal Osteotomy Of The First Metatarsal;  Recorded: 2009;     HYSTERECTOMY  ?     OOPHORECTOMY Bilateral ?      Allergies   Allergen Reactions     Sulfa (Sulfonamide Antibiotics) [Sulfa Antibiotics] Unknown      Social History     Tobacco Use     Smoking status: Former     Smokeless tobacco: Former     Tobacco comments:     quit 21yrs ago   Vaping Use     Vaping status: Not on file   Substance Use Topics     Alcohol use: Not on file      Wt Readings from Last 1 Encounters:   23 63 kg (138 lb 14.2 oz)        Anesthesia Evaluation   Pt has had prior anesthetic.     No history of anesthetic complications       ROS/MED HX  ENT/Pulmonary:  - neg pulmonary ROS     Neurologic:  - neg neurologic ROS     Cardiovascular:     (+) Dyslipidemia ----- (-) hypertension and CAD   METS/Exercise Tolerance: >4 METS    Hematologic:    (-) anemia   Musculoskeletal:   (+) arthritis, fracture, Fracture location: LLE,     GI/Hepatic:  - neg GI/hepatic ROS  (-) GERD   Renal/Genitourinary:  - neg Renal ROS     Endo:     (+) thyroid problem,     Psychiatric/Substance Use:       Infectious Disease:  - neg infectious disease ROS     Malignancy:       Other:            Physical Exam    Airway        Mallampati: III   TM distance: < 3 FB   Neck ROM: full   Mouth  opening: > 3 cm    Respiratory Devices and Support         Dental       (+) Minor Abnormalities - some fillings, tiny chips      Cardiovascular   cardiovascular exam normal          Pulmonary   pulmonary exam normal                OUTSIDE LABS:  CBC:   Lab Results   Component Value Date    WBC 17.7 (H) 05/19/2023    WBC 6.0 01/27/2023    HGB 15.4 05/19/2023    HGB 15.2 01/27/2023    HCT 46.6 05/19/2023    HCT 44.8 01/27/2023     05/19/2023     01/27/2023     BMP:   Lab Results   Component Value Date     05/19/2023     01/27/2023    POTASSIUM 3.6 05/19/2023    POTASSIUM 5.0 01/27/2023    CHLORIDE 99 05/19/2023    CHLORIDE 103 01/27/2023    CO2 24 05/19/2023    CO2 24 01/27/2023    BUN 24.3 (H) 05/19/2023    BUN 20.8 01/27/2023    CR 0.84 05/19/2023    CR 0.84 01/27/2023     (H) 05/19/2023     (H) 01/27/2023     COAGS: No results found for: PTT, INR, FIBR  POC: No results found for: BGM, HCG, HCGS  HEPATIC:   Lab Results   Component Value Date    ALBUMIN 4.7 05/19/2023    PROTTOTAL 8.3 05/19/2023    ALT 25 05/19/2023    AST 28 05/19/2023    ALKPHOS 76 05/19/2023    BILITOTAL 0.5 05/19/2023     OTHER:   Lab Results   Component Value Date    ELTON 9.9 05/19/2023    TSH 2.28 01/27/2023       Anesthesia Plan    ASA Status:  2   NPO Status:  NPO Appropriate    Anesthesia Type: Spinal.   Induction: Propofol.   Maintenance: TIVA.        Consents    Anesthesia Plan(s) and associated risks, benefits, and realistic alternatives discussed. Questions answered and patient/representative(s) expressed understanding.    - Discussed:     - Discussed with:  Patient         Postoperative Care    Pain management: Multi-modal analgesia, Peripheral nerve block (Single Shot).   PONV prophylaxis: Ondansetron (or other 5HT-3), Dexamethasone or Solumedrol, Background Propofol Infusion     Comments:    Other Comments: SAB  Propofol gtt  Phenylephrine gtt  Decadron 10, zofran 4  Preop FI PNB - mepivacaine  per surgeon request due to goal of ambulation today            Caryn Alejo MD

## 2023-05-20 NOTE — PLAN OF CARE
Goal Outcome Evaluation:       Pt is alert and oriented x4. Lung sounds were clear and equal bilaterally. Pedal pulses were 2+. Pt is voiding with the external purewick catheter. Pt reports 8/10 pain. Gave oxycodone and tylenol and pt reported improved pain.

## 2023-05-20 NOTE — ANESTHESIA PROCEDURE NOTES
"Intrathecal injection Procedure Note    Pre-Procedure   Staff -        Anesthesiologist:  Caryn Gray MD       Performed By: anesthesiologist       Location: OR       Procedure Start/Stop Times: 5/20/2023 10:49 AM and 5/20/2023 10:52 AM       Pre-Anesthestic Checklist: patient identified, IV checked, risks and benefits discussed, informed consent, monitors and equipment checked, pre-op evaluation, at physician/surgeon's request and post-op pain management  Timeout:       Correct Patient: Yes        Correct Procedure: Yes        Correct Site: Yes        Correct Position: Yes   Procedure Documentation  Procedure: intrathecal injection       Patient Position: RLD       Patient Prep/Sterile Barriers: sterile gloves, mask, patient draped       Skin prep: Chloraprep       Insertion Site: L3-4. (midline approach).       Needle Gauge: 24.        Needle Length (Inches): 4        Spinal Needle Type: Pencan       Introducer used       Introducer: 20 G       # of attempts: 1 and  # of redirects:  0    Assessment/Narrative         Paresthesias: No.       Sensory Level: T8       CSF fluid: clear.       Opening pressure was cmH2O while  Lateral.      Medication(s) Administered   0.5% Bupivacaine PF (Intrathecal) - Intrathecal   2.6 mL - 5/20/2023 10:49:00 AM  Medication Administration Time: 5/20/2023 10:49 AM      FOR [facility] ONLY:   Pain Team Contact information: please page the Pain Team Via [employer]. Search \"Pain\". During daytime hours, please page the attending first. At night please page the resident first.      "

## 2023-05-20 NOTE — PROGRESS NOTES
Daily Progress Note        CODE STATUS:  Full Code    05/20/23  Assessment/Plan:  Lesly Hutchinson is a 73 year old female with history of hypothyroidism and rheumatoid arthritis who presented to ER for left hip pain after a fall.      Acute left hip fracture:   -2/2 mechanical fall. CT hip with subcapital fracture left femoral neck with minimal impaction but no significant angulation deformity.  -Pain control: Scheduled Tylenol, oxycodone as needed  -Orthopedic surgery consulted. For total hip replacement today.  -Patient reports no history of hypertension, diabetes, CKD, cardiac and pulmonary diseases.  She has good functional status.  She is medically optimized for the planned surgical intervention.  -Patient lives alone.  May need TCU placement.     Elevated blood pressure without the diagnosis of hypertension:   -Patient had elevated blood pressure up to 192/114 in ER due to anxiety.  She does not have history of hypertension.  -Well controlled today  -Hydralazine as needed.     Hypothyroidism:   -Continue PTA levothyroxine     History of rheumatoid arthritis:   -Patient reports that she takes ibuprofen as needed joint pain.        Diet:  NPO after midnight for surgery today  DVT Prophylaxis: Enoxaparin (Lovenox) subcutaneous after surgery tomorrow  Donald Catheter: Not present  Lines: None     Cardiac Monitoring: None  Code Status:  Full code      Disposition; 2-3 days, may need TCU  Barrier to discharge; Surgery today.      LOS: 1 day     Subjective:  Interval History: Patient seen and examined this morning. Notes, labs, imaging reports personally reviewed. Patient reports no pain while staying still. Denies any other symptoms, specifically no chest pain, shortness of breath or palpitations.     Review of Systems:   As mentioned in subjective.    Patient Active Problem List   Diagnosis     Benign Adenomatous Polyp Of The Large Intestine     Menopause     Osteopenia with high risk of fracture     Hypothyroidism      Primary osteoarthritis of both knees     Primary osteoarthritis involving multiple joints     Wears hearing aid in both ears     Elevated BP without diagnosis of hypertension     Hyperlipidemia LDL goal <100 ascvd risk 17%     Hip fracture, left, closed, initial encounter (H)       Scheduled Meds:    [Auto Hold] acetaminophen  975 mg Oral TID     ceFAZolin  2 g Intravenous Pre-Op/Pre-procedure x 1 dose     ceFAZolin  2 g Intravenous See Admin Instructions     [Auto Hold] enoxaparin ANTICOAGULANT  40 mg Subcutaneous Q24H     [Auto Hold] gabapentin  600 mg Oral At Bedtime     [Auto Hold] levothyroxine  50 mcg Oral QAM AC     ropivacaine 300 mg, ketorolac 30 mg, EPHINEPHrine 0.6 mg in sodium chloride 0.9%   INTRA-ARTICULAR On Call to OR     sodium chloride (PF)  3 mL Intracatheter Q8H     [Auto Hold] sodium chloride (PF)  3 mL Intracatheter Q8H     Continuous Infusions:    lactated ringers       lactated ringers 100 mL/hr at 05/20/23 1038     PRN Meds:.[Auto Hold] bisacodyl **OR** [Auto Hold] bisacodyl, fentaNYL, fentaNYL, fentaNYL, fentaNYL, haloperidol lactate, [Auto Hold] hydrALAZINE, HYDROmorphone, HYDROmorphone, [Auto Hold] ibuprofen, lidocaine 4%, [Auto Hold] lidocaine 4%, lidocaine (buffered or not buffered), [Auto Hold] lidocaine (buffered or not buffered), [Auto Hold] melatonin, midazolam, midazolam, naloxone **OR** naloxone **OR** naloxone **OR** naloxone, ondansetron **OR** ondansetron, [Auto Hold] oxyCODONE, prochlorperazine, sodium chloride (PF), [Auto Hold] sodium chloride (PF)    Objective:  Vital signs in last 24 hours:  Temp:  [97.3  F (36.3  C)-98.5  F (36.9  C)] 98.5  F (36.9  C)  Pulse:  [] 81  Resp:  [12-17] 16  BP: (125-204)/() 125/67  SpO2:  [90 %-98 %] 90 %      No intake or output data in the 24 hours ending 05/20/23 1102    Physical Exam:    General: Not in obvious distress.  HEENT: NC, AT   Chest: Clear to auscultation bilaterally  Heart: S1S2 normal, regular. No  M/R/G  Abdomen: Soft. NT, ND. Bowel sounds- active.  Extremities: No legs swelling  Neuro: Alert and awake, grossly non-focal      Lab Results:(I have personally reviewed the results)    Recent Results (from the past 24 hour(s))   Comprehensive metabolic panel    Collection Time: 05/19/23  6:08 PM   Result Value Ref Range    Sodium 138 136 - 145 mmol/L    Potassium 3.6 3.4 - 5.3 mmol/L    Chloride 99 98 - 107 mmol/L    Carbon Dioxide (CO2) 24 22 - 29 mmol/L    Anion Gap 15 7 - 15 mmol/L    Urea Nitrogen 24.3 (H) 8.0 - 23.0 mg/dL    Creatinine 0.84 0.51 - 0.95 mg/dL    Calcium 9.9 8.8 - 10.2 mg/dL    Glucose 135 (H) 70 - 99 mg/dL    Alkaline Phosphatase 76 35 - 104 U/L    AST 28 10 - 35 U/L    ALT 25 10 - 35 U/L    Protein Total 8.3 6.4 - 8.3 g/dL    Albumin 4.7 3.5 - 5.2 g/dL    Bilirubin Total 0.5 <=1.2 mg/dL    GFR Estimate 73 >60 mL/min/1.73m2   CBC with platelets and differential    Collection Time: 05/19/23  6:08 PM   Result Value Ref Range    WBC Count 17.7 (H) 4.0 - 11.0 10e3/uL    RBC Count 5.02 3.80 - 5.20 10e6/uL    Hemoglobin 15.4 11.7 - 15.7 g/dL    Hematocrit 46.6 35.0 - 47.0 %    MCV 93 78 - 100 fL    MCH 30.7 26.5 - 33.0 pg    MCHC 33.0 31.5 - 36.5 g/dL    RDW 12.3 10.0 - 15.0 %    Platelet Count 246 150 - 450 10e3/uL    % Neutrophils 88 %    % Lymphocytes 7 %    % Monocytes 4 %    % Eosinophils 0 %    % Basophils 0 %    % Immature Granulocytes 1 %    NRBCs per 100 WBC 0 <1 /100    Absolute Neutrophils 15.5 (H) 1.6 - 8.3 10e3/uL    Absolute Lymphocytes 1.2 0.8 - 5.3 10e3/uL    Absolute Monocytes 0.7 0.0 - 1.3 10e3/uL    Absolute Eosinophils 0.0 0.0 - 0.7 10e3/uL    Absolute Basophils 0.1 0.0 - 0.2 10e3/uL    Absolute Immature Granulocytes 0.1 <=0.4 10e3/uL    Absolute NRBCs 0.0 10e3/uL       All laboratory and imaging data in the past 24 hours reviewed  Serum Glucose range:   Recent Labs   Lab 05/19/23  1808   *     ABG: No lab results found in last 7 days.  CBC:   Recent Labs   Lab  "05/19/23  1808   WBC 17.7*   HGB 15.4   HCT 46.6   MCV 93      NEUTROPHIL 88   LYMPH 7   MONOCYTE 4   EOSINOPHIL 0     Chemistry:   Recent Labs   Lab 05/19/23  1808      POTASSIUM 3.6   CHLORIDE 99   CO2 24   BUN 24.3*   CR 0.84   GFRESTIMATED 73   ELTON 9.9   PROTTOTAL 8.3   ALBUMIN 4.7   AST 28   ALT 25   ALKPHOS 76   BILITOTAL 0.5     Coags:  No results for input(s): INR, PROTIME, PTT in the last 168 hours.    Invalid input(s): APTT  Cardiac Markers:  No results for input(s): CKTOTAL, TROPONINI in the last 168 hours.       POC US Guidance Needle Placement    Result Date: 5/20/2023  Ultrasound was performed as guidance to an anesthesia procedure.  Click \"PACS images\" hyperlink below to view any stored images.  For specific procedure details, view procedure note authored by anesthesia.    POC US Guidance Needle Placement    Result Date: 5/20/2023  Ultrasound was performed as guidance to an anesthesia procedure.  Click \"PACS images\" hyperlink below to view any stored images.  For specific procedure details, view procedure note authored by anesthesia.    POC US Guidance Needle Placement    Result Date: 5/20/2023  Ultrasound was performed as guidance to an anesthesia procedure.  Click \"PACS images\" hyperlink below to view any stored images.  For specific procedure details, view procedure note authored by anesthesia.    CT Hip Left w/o Contrast    Result Date: 5/19/2023  EXAM: CT HIP LEFT W/O CONTRAST LOCATION: [facility] DATE/TIME: 5/19/2023 6:21 PM CDT INDICATION: Left femoral neck fracture. Surgical planning purposes. COMPARISON: None. TECHNIQUE: Noncontrast. Axial, sagittal and coronal thin-section reconstruction. Dose reduction techniques were used. FINDINGS: BONES: -Subcapital fracture left femoral neck. Minimal impaction. No evidence for intertrochanteric extension. Degenerative change at the left hip joint itself. No dislocation. Degenerative change at the left SI joint. No " additional fractures are identified within the visualized portion of the left hemipelvis. SOFT TISSUES: -Fatty infiltration and atrophy of the left gluteus minimus and medius. No evidence for significant effusion. No evidence for significant hematoma or fluid collection about the fracture.     IMPRESSION: 1.  Subcapital fracture left femoral neck with minimal impaction but no significant angulation deformity. No evidence for intertrochanteric extension. 2.  Degenerative change left hip joint. 3.  Visualized left hemipelvis negative for fracture. 4.  Degenerative change at the left SI joint.       Latest radiology report personally reviewed.    Note created using dragon voice recognition software so sounds alike errors may have escaped editing.      05/20/2023   Jorge Diallo MD  Hospitalist, [facility]  Pager: [phone]

## 2023-05-20 NOTE — ANESTHESIA PROCEDURE NOTES
Fascia iliaca Procedure Note    Pre-Procedure   Staff -        Anesthesiologist:  Caryn Gray MD       Performed By: anesthesiologist       Location: pre-op       Procedure Start/Stop Times: 5/20/2023 10:30 AM and 5/20/2023 10:33 AM       Pre-Anesthestic Checklist: patient identified, IV checked, site marked, risks and benefits discussed, informed consent, monitors and equipment checked, pre-op evaluation, at physician/surgeon's request and post-op pain management  Timeout:       Correct Patient: Yes        Correct Procedure: Yes        Correct Site: Yes        Correct Position: Yes        Correct Laterality: Yes        Site Marked: Yes  Procedure Documentation  Procedure: Fascia iliaca       Laterality: left       Patient Position: supine       Patient Prep/Sterile Barriers: sterile gloves, mask       Skin prep: Chloraprep       Needle type: stimuplex.       Needle Gauge: 21.        Needle Length (Inches): 4        Ultrasound guided       1. Ultrasound was used to identify targeted nerve, plexus, vascular marker, or fascial plane and place a needle adjacent to it in real-time.       2. Ultrasound was used to visualize the spread of anesthetic in close proximity to the above referenced structure.       3. A permanent image is entered into the patient's record.       4. The visualized anatomic structures appeared normal.       5. There were no apparent abnormal pathologic findings.    Assessment/Narrative         The placement was negative for: blood aspirated, painful injection and site bleeding       Paresthesias: No.       Bolus given via needle. no blood aspirated via catheter.        Secured via.        Insertion/Infusion Method: Single Shot       Complications: none       Injection made incrementally with aspirations every 5 mL.    Medication(s) Administered   Mepivacaine 1.5% PF (Perineural) - Perineural   20 mL - 5/20/2023 10:30:00 AM  Medication Administration Time: 5/20/2023 10:30 AM      FOR  "[facility] ONLY:   Pain Team Contact information: please page the Pain Team Via [employer]. Search \"Pain\". During daytime hours, please page the attending first. At night please page the resident first.      "

## 2023-05-20 NOTE — ANESTHESIA POSTPROCEDURE EVALUATION
Patient: Lesly Hutchinson    Procedure: Procedure(s):  ARTHROPLASTY, HIP, TOTAL, LEFT       Anesthesia Type:  Spinal    Note:  Disposition: Inpatient   Postop Pain Control: Uneventful            Sign Out: Well controlled pain   PONV: No   Neuro/Psych: Uneventful            Sign Out: Acceptable/Baseline neuro status   Airway/Respiratory: Uneventful            Sign Out: Acceptable/Baseline resp. status   CV/Hemodynamics: Uneventful            Sign Out: Acceptable CV status; No obvious hypovolemia; No obvious fluid overload   Other NRE: NONE   DID A NON-ROUTINE EVENT OCCUR?            Last vitals:  Vitals Value Taken Time   /67 05/20/23 1416   Temp 37.2  C (98.9  F) 05/20/23 1416   Pulse 81 05/20/23 1417   Resp 27 05/20/23 1417   SpO2 96 % 05/20/23 1418   Vitals shown include unvalidated device data.    Electronically Signed By: Caryn Alejo MD  May 20, 2023  2:58 PM

## 2023-05-20 NOTE — CONSULTS
ORTHOPEDIC CONSULTATION      IMPRESSION:  Left femoral neck fracture.      PLAN:  Given her relatively young age and functional status, would lean towards total hip arthroplasty in this setting.  I discussed the risks and benefits of this procedure with her.  An alternative option would be cannulated screw fixation.  This would necessitate 6 weeks of partial weightbearing in the left lower extremity as well as potential for fracture nonunion.  Ultimately, we elected to proceed with posterior total hip arthroplasty.       CHIEF COMPLAINT: Hip fracture, left, closed, initial encounter (H)    HISTORY OF PRESENT ILLNESS:  Tripped on a curb yesterday and fell onto left hip. Presented to [facility] [place] urgent care and found to have left femoral neck fracture. Transferred to [facility] for further management.     She is an active lady who enjoys walking.  She works as an .  She also enjoys riding a bike.    PAST MEDICAL HISTORY:  Past Medical History:   Diagnosis Date     Rheumatoid arthritis (H)        ALLERGIES:   Review of patient's allergies indicates   Allergies   Allergen Reactions     Sulfa (Sulfonamide Antibiotics) [Sulfa Antibiotics] Unknown       MEDICATIONS UPON ADMISSION:  Medications Prior to Admission   Medication Sig Dispense Refill Last Dose     albuterol (PROAIR HFA/PROVENTIL HFA/VENTOLIN HFA) 108 (90 Base) MCG/ACT inhaler Inhale 2 puffs into the lungs every 6 hours as needed for shortness of breath 18 g 0 Unknown at prn     calcium-vitamin D (CALCIUM-VITAMIN D) 500 mg(1,250mg) -200 unit per tablet [CALCIUM-VITAMIN D (CALCIUM-VITAMIN D) 500 MG(1,250MG) -200 UNIT PER TABLET] Take 1 tablet by mouth.   5/18/2023 at pm     gabapentin (NEURONTIN) 600 MG tablet TAKE 1 TABLET AT BEDTIME (NEED TO BE SEEN FOR ANNUAL PHYSICAL/LAB BEFORE FURTHER REFILL, PLEASE MAKE APPOINTMENT) 90 tablet 3 5/18/2023 at hs     ibuprofen (ADVIL,MOTRIN) 600 MG tablet [IBUPROFEN (ADVIL,MOTRIN) 600 MG TABLET] Take  600 mg by mouth every 6 (six) hours as needed for pain.   5/19/2023 at 1400     levothyroxine (SYNTHROID/LEVOTHROID) 50 MCG tablet TAKE 1 TABLET DAILY 90 tablet 3 5/19/2023 at am       SOCIAL HISTORY:   she  reports that she has quit smoking. She has quit using smokeless tobacco.    FAMILY HISTORY:  family history includes Lung Cancer in her father; Throat cancer in her maternal uncle.    REVIEW OF SYSTEMS:   Reviewed with patient. See HPI, otherwise negative.    PHYSICAL EXAMINATION:  Vitals: Temp:  [97.3  F (36.3  C)-98.2  F (36.8  C)] 97.3  F (36.3  C)  Pulse:  [] 73  Resp:  [12-17] 17  BP: (125-204)/() 125/67  SpO2:  [92 %-98 %] 96 %  No acute distress, breathing underway, normal affect.  Examination of the left lower extremity reveals no skin lesions overlying her left hip.  She wiggles her toes.  Good sensation the dorsal and plantar foot which is warm and well-perfused on the left.    RADIOGRAPHIC EVALUATION:   1.  AP pelvis and frog lateral of her left hip obtained at our orthopedic urgent care show a subcapital femoral neck fracture, valgus impacted.  2.  CT scan is reviewed and shows valgus impacted left subcapital femoral neck fracture without extension.    PERTINENT LABS:  Reviewed.      Willem Ohara MD   [facility]  5/20/2023

## 2023-05-21 ENCOUNTER — APPOINTMENT (OUTPATIENT)
Dept: OCCUPATIONAL THERAPY | Facility: HOSPITAL | Age: 73
DRG: 522 | End: 2023-05-21
Attending: ORTHOPAEDIC SURGERY
Payer: COMMERCIAL

## 2023-05-21 ENCOUNTER — APPOINTMENT (OUTPATIENT)
Dept: PHYSICAL THERAPY | Facility: HOSPITAL | Age: 73
DRG: 522 | End: 2023-05-21
Attending: ORTHOPAEDIC SURGERY
Payer: COMMERCIAL

## 2023-05-21 LAB
ATRIAL RATE - MUSE: 67 BPM
DIASTOLIC BLOOD PRESSURE - MUSE: NORMAL MMHG
GLUCOSE BLDC GLUCOMTR-MCNC: 120 MG/DL (ref 70–99)
HGB BLD-MCNC: 10.5 G/DL (ref 11.7–15.7)
HGB BLD-MCNC: 10.9 G/DL (ref 11.7–15.7)
HOLD SPECIMEN: NORMAL
INTERPRETATION ECG - MUSE: NORMAL
P AXIS - MUSE: 59 DEGREES
PR INTERVAL - MUSE: 204 MS
QRS DURATION - MUSE: 88 MS
QT - MUSE: 410 MS
QTC - MUSE: 433 MS
R AXIS - MUSE: 69 DEGREES
SYSTOLIC BLOOD PRESSURE - MUSE: NORMAL MMHG
T AXIS - MUSE: 71 DEGREES
TROPONIN T SERPL HS-MCNC: 8 NG/L
VENTRICULAR RATE- MUSE: 67 BPM

## 2023-05-21 PROCEDURE — 97161 PT EVAL LOW COMPLEX 20 MIN: CPT | Mod: GP

## 2023-05-21 PROCEDURE — 97166 OT EVAL MOD COMPLEX 45 MIN: CPT | Mod: GO

## 2023-05-21 PROCEDURE — 85018 HEMOGLOBIN: CPT | Performed by: INTERNAL MEDICINE

## 2023-05-21 PROCEDURE — 36415 COLL VENOUS BLD VENIPUNCTURE: CPT | Performed by: ORTHOPAEDIC SURGERY

## 2023-05-21 PROCEDURE — 36415 COLL VENOUS BLD VENIPUNCTURE: CPT | Performed by: INTERNAL MEDICINE

## 2023-05-21 PROCEDURE — 97535 SELF CARE MNGMENT TRAINING: CPT | Mod: GO

## 2023-05-21 PROCEDURE — 99232 SBSQ HOSP IP/OBS MODERATE 35: CPT | Performed by: INTERNAL MEDICINE

## 2023-05-21 PROCEDURE — 93005 ELECTROCARDIOGRAM TRACING: CPT

## 2023-05-21 PROCEDURE — 97110 THERAPEUTIC EXERCISES: CPT | Mod: GP

## 2023-05-21 PROCEDURE — 250N000011 HC RX IP 250 OP 636: Performed by: ORTHOPAEDIC SURGERY

## 2023-05-21 PROCEDURE — 250N000011 HC RX IP 250 OP 636: Performed by: INTERNAL MEDICINE

## 2023-05-21 PROCEDURE — 97116 GAIT TRAINING THERAPY: CPT | Mod: GP

## 2023-05-21 PROCEDURE — 250N000013 HC RX MED GY IP 250 OP 250 PS 637: Performed by: ORTHOPAEDIC SURGERY

## 2023-05-21 PROCEDURE — 84484 ASSAY OF TROPONIN QUANT: CPT | Performed by: INTERNAL MEDICINE

## 2023-05-21 PROCEDURE — 85018 HEMOGLOBIN: CPT | Performed by: ORTHOPAEDIC SURGERY

## 2023-05-21 PROCEDURE — 250N000013 HC RX MED GY IP 250 OP 250 PS 637: Performed by: INTERNAL MEDICINE

## 2023-05-21 PROCEDURE — 93010 ELECTROCARDIOGRAM REPORT: CPT | Mod: RTG | Performed by: INTERNAL MEDICINE

## 2023-05-21 PROCEDURE — 120N000001 HC R&B MED SURG/OB

## 2023-05-21 RX ADMIN — ACETAMINOPHEN 975 MG: 325 TABLET ORAL at 17:01

## 2023-05-21 RX ADMIN — ASPIRIN 81 MG: 81 TABLET, COATED ORAL at 08:54

## 2023-05-21 RX ADMIN — ACETAMINOPHEN 975 MG: 325 TABLET ORAL at 08:54

## 2023-05-21 RX ADMIN — GABAPENTIN 600 MG: 300 CAPSULE ORAL at 22:01

## 2023-05-21 RX ADMIN — ACETAMINOPHEN 975 MG: 325 TABLET ORAL at 01:17

## 2023-05-21 RX ADMIN — CEFAZOLIN 1 G: 1 INJECTION, POWDER, FOR SOLUTION INTRAMUSCULAR; INTRAVENOUS at 03:21

## 2023-05-21 RX ADMIN — HYDROXYZINE HYDROCHLORIDE 10 MG: 10 TABLET ORAL at 12:12

## 2023-05-21 RX ADMIN — LEVOTHYROXINE SODIUM 50 MCG: 0.03 TABLET ORAL at 06:51

## 2023-05-21 RX ADMIN — HYDROXYZINE HYDROCHLORIDE 10 MG: 10 TABLET ORAL at 22:00

## 2023-05-21 RX ADMIN — CEPHALEXIN 500 MG: 500 CAPSULE ORAL at 17:01

## 2023-05-21 RX ADMIN — ENOXAPARIN SODIUM 40 MG: 40 INJECTION SUBCUTANEOUS at 22:01

## 2023-05-21 RX ADMIN — Medication 1 MG: at 22:00

## 2023-05-21 RX ADMIN — ASPIRIN 81 MG: 81 TABLET, COATED ORAL at 22:01

## 2023-05-21 RX ADMIN — SENNOSIDES AND DOCUSATE SODIUM 1 TABLET: 50; 8.6 TABLET ORAL at 22:00

## 2023-05-21 RX ADMIN — SENNOSIDES AND DOCUSATE SODIUM 1 TABLET: 50; 8.6 TABLET ORAL at 08:54

## 2023-05-21 RX ADMIN — CEPHALEXIN 500 MG: 500 CAPSULE ORAL at 06:51

## 2023-05-21 RX ADMIN — CEPHALEXIN 500 MG: 500 CAPSULE ORAL at 12:12

## 2023-05-21 RX ADMIN — KETOROLAC TROMETHAMINE 15 MG: 30 INJECTION, SOLUTION INTRAMUSCULAR; INTRAVENOUS at 03:20

## 2023-05-21 RX ADMIN — IBUPROFEN 600 MG: 600 TABLET, FILM COATED ORAL at 12:12

## 2023-05-21 RX ADMIN — KETOROLAC TROMETHAMINE 15 MG: 30 INJECTION, SOLUTION INTRAMUSCULAR; INTRAVENOUS at 08:52

## 2023-05-21 RX ADMIN — IBUPROFEN 600 MG: 600 TABLET, FILM COATED ORAL at 22:00

## 2023-05-21 ASSESSMENT — ACTIVITIES OF DAILY LIVING (ADL)
ADLS_ACUITY_SCORE: 41
ADLS_ACUITY_SCORE: 37
ADLS_ACUITY_SCORE: 41
ADLS_ACUITY_SCORE: 37

## 2023-05-21 NOTE — PLAN OF CARE
Goal Outcome Evaluation:     Pr is A&Ox4, SBA with walker for ambulation, using call light for staff assist and participating with therapies. Eating/drinking well. Denied SOB. Pulses intact, denied numbness or tingling in LLE. Pt reports pain 6/10, received scheduled tordol and tylenol, prn ibuprofen and hydroxyzine which has been effective. Pt is ambulating to BR and voiding, passing gas, no bm.   Problem: Plan of Care - These are the overarching goals to be used throughout the patient stay.    Goal: Optimal Comfort and Wellbeing  Outcome: Progressing  Intervention: Monitor Pain and Promote Comfort  Recent Flowsheet Documentation  Taken 5/21/2023 0854 by Melissa Harper RN  Pain Management Interventions:   medication (see MAR)   ambulation/increased activity   breathing exercises   cold applied   distraction   emotional support   repositioned   relaxation techniques promoted     Problem: Hip Fracture Medical Management  Goal: Absence of Bleeding  Outcome: Progressing  Goal: Effective Bowel Elimination  Outcome: Progressing  Goal: Baseline Cognitive Function Maintained  Outcome: Progressing  Goal: Optimal Pain Control and Function  Outcome: Progressing  Intervention: Manage Acute Orthopaedic-Related Pain  Recent Flowsheet Documentation  Taken 5/21/2023 0854 by Melissa Harper RN  Pain Management Interventions:   medication (see MAR)   ambulation/increased activity   breathing exercises   cold applied   distraction   emotional support   repositioned   relaxation techniques promoted  Goal: Effective Urinary Elimination  Outcome: Progressing     Problem: Plan of Care - These are the overarching goals to be used throughout the patient stay.    Goal: Absence of Hospital-Acquired Illness or Injury  Intervention: Prevent Skin Injury  Recent Flowsheet Documentation  Taken 5/21/2023 0859 by Melissa Harper RN  Body Position: position changed independently

## 2023-05-21 NOTE — PLAN OF CARE
Goal Outcome Evaluation:     Pt is A&Ox4, able to make needs known and using call light for staff assist as needed. Pt denied pain, reported some numbness to left leg, pt is able to feel touch and lift left leg off bed, pulses intact,  dressing is clean, dry and intact. Pt iced, received scheduled medications. Pt up to commode x3, good urine output. Up to chair for dinner with assist x1. Tolerating reg diet, drinking well. VSS  Problem: Plan of Care - These are the overarching goals to be used throughout the patient stay.    Goal: Optimal Comfort and Wellbeing  Outcome: Progressing     Problem: Hip Fracture Medical Management  Goal: Absence of Bleeding  Outcome: Progressing  Goal: Effective Bowel Elimination  Outcome: Progressing  Goal: Baseline Cognitive Function Maintained  Outcome: Progressing  Goal: Optimal Pain Control and Function  Outcome: Progressing  Goal: Effective Urinary Elimination  Outcome: Progressing     Problem: Plan of Care - These are the overarching goals to be used throughout the patient stay.    Goal: Absence of Hospital-Acquired Illness or Injury  Intervention: Identify and Manage Fall Risk  Recent Flowsheet Documentation  Taken 5/20/2023 0829 by Melissa Harper RN  Safety Promotion/Fall Prevention:   patient and family education   nonskid shoes/slippers when out of bed  Intervention: Prevent Skin Injury  Recent Flowsheet Documentation  Taken 5/20/2023 2000 by Melissa Harper RN  Body Position: supine, head elevated  Taken 5/20/2023 1553 by Melissa Harper RN  Body Position: supine  Taken 5/20/2023 1443 by Melissa Harper, RN  Body Position: supine

## 2023-05-21 NOTE — PROGRESS NOTES
"   05/21/23 0945   Appointment Info   Signing Clinician's Name / Credentials (PT) Paula Sampson PT DPT   Living Environment   People in Home alone   Current Living Arrangements house   Home Accessibility stairs within home   Number of Stairs, Within Home, Primary seven   Stair Railings, Within Home, Primary railing on right side (ascending)   Transportation Anticipated car, drives self   Living Environment Comments Patient lives in a split level home   Self-Care   Usual Activity Tolerance good   Current Activity Tolerance moderate   Equipment Currently Used at Home none   Fall history within last six months yes   Number of times patient has fallen within last six months   (1)   Activity/Exercise/Self-Care Comment Pt tripped on a sidewalk/curb, resulting in left femoral fracture   General Information   Onset of Illness/Injury or Date of Surgery 05/19/23   Referring Physician Willem Ohara MD   Patient/Family Therapy Goals Statement (PT) Resolve pain   Pertinent History of Current Problem (include personal factors and/or comorbidities that impact the POC) Per pt's chart: \"73 year old female with history of hypothyroidism and rheumatoid arthritis who presented to ER for left hip pain after a fall.\"   Existing Precautions/Restrictions fall   Weight-Bearing Status - LLE weight-bearing as tolerated   Cognition   Affect/Mental Status (Cognition) WFL   Orientation Status (Cognition) oriented x 4   Follows Commands (Cognition) WFL   Pain Assessment   Patient Currently in Pain Yes, see Vital Sign flowsheet   Posture    Posture Forward head position;Protracted shoulders   Range of Motion (ROM)   Range of Motion ROM deficits secondary to surgical procedure;ROM deficits secondary to pain   Strength (Manual Muscle Testing)   Strength (Manual Muscle Testing) Deficits observed during functional mobility   Bed Mobility   Bed Mobility supine-sit;sit-supine   Supine-Sit [place] (Bed Mobility) modified independence "   Sit-Supine [place] (Bed Mobility) modified independence   Bed Mobility Limitations decreased ability to use legs for bridging/pushing;impaired ability to control trunk for mobility   Impairments Contributing to Impaired Bed Mobility impaired balance;pain;decreased strength   Assistive Device (Bed Mobility) bed rails  (elevated HOB)   Transfers   Transfers sit-stand transfer   Maintains Weight-bearing Status (Transfers) able to maintain   Transfer Safety Concerns Noted decreased step length;decreased weight-shifting ability   Impairments Contributing to Impaired Transfers impaired balance;pain;decreased strength   Sit-Stand Transfer   Sit-Stand [place] (Transfers) contact guard   Assistive Device (Sit-Stand Transfers) walker, front-wheeled   Gait/Stairs (Locomotion)   [place] Level (Gait) contact guard   Assistive Device (Gait) walker, front-wheeled   Distance in Feet 150 feet   Distance in Feet (Gait) 150 feet   Pattern (Gait) step-to   Deviations/Abnormal Patterns (Gait) antalgic;left sided deviations;base of support, narrow;festinating/shuffling;gait speed decreased;stride length decreased;weight shifting decreased   Maintains Weight-bearing Status (Gait) able to maintain   Balance   Balance Comments Requires UE support in standing   Clinical Impression   Criteria for Skilled Therapeutic Intervention Yes, treatment indicated   PT Diagnosis (PT) Impaired functional mobility   Influenced by the following impairments recent surgery, pain, range of motion, strength   Functional limitations due to impairments bed mobility, transfers, ambulation, stairs, community participation, driving   Clinical Presentation (PT Evaluation Complexity) Stable/Uncomplicated   Clinical Presentation Rationale Pt presents as medically diagnosed   Clinical Decision Making (Complexity) low complexity   Planned Therapy Interventions (PT) balance training;bed mobility training;gait training;patient/family education;ROM (range  of motion);stair training;strengthening;transfer training;progressive activity/exercise;home program guidelines   Anticipated Equipment Needs at Discharge (PT) walker, rolling  (FWW)   Risk & Benefits of therapy have been explained evaluation/treatment results reviewed;care plan/treatment goals reviewed;risks/benefits reviewed;patient   PT Total Evaluation Time   PT Eval, Low Complexity Minutes (85536) 13   Plan of Care Review   Plan of Care Reviewed With patient   Physical Therapy Goals   PT Frequency 2x/day   PT Predicted Duration/Target Date for Goal Attainment 05/28/23   PT Goals Bed Mobility;Transfers;Gait;Stairs   PT: Bed Mobility Modified independent;Supine to/from sit   PT: Transfers Modified independent;Sit to/from stand;Assistive device   PT: Gait Modified independent;Assistive device;150 feet   PT: Stairs Modified independent;7 stairs   Interventions   Interventions Quick Adds Gait Training;Therapeutic Procedure   Therapeutic Procedure/Exercise   Ther. Procedure: strength, endurance, ROM, flexibillity Minutes (91983) 10   Symptoms Noted During/After Treatment fatigue;increased pain   Treatment Detail/Skilled Intervention PT: Pt instructed in seated LE TE with decreased AROM to maintain pt's tolerance. Pt performed x 15 repetitions of LAQ, marching, hip ABD, ankle pumps, SBA provided.   Gait Training   Gait Training Minutes (23504) 13   Symptoms Noted During/After Treatment (Gait Training) fatigue;increased pain   Treatment Detail/Skilled Intervention PT: Pt facilitated in ambulation using FWW, CGA provided x 150 feet. Pt demonstrates decreased weight shifting onto LLE due to pain, step-to gait pattern, and decreased step length on right. 2 standing rest breaks due to fatigue. Able to look around and navigate busy charles, no signs of LOB.   [place] Level (Gait Training) contact guard   Physical Assistance Level (Gait Training) verbal cues   Weight Bearing (Gait Training) weight-bearing as tolerated    Assistive Device (Gait Training) rolling walker  (FWW)   Gait Analysis Deviations decreased faizan;increased time in double stance;decreased step length;decreased stride length;decreased toe-to-floor clearance;decreased weight-shifting ability   Impairments (Gait Analysis/Training) balance impaired;pain;strength decreased   PT Discharge Planning   PT Plan Progress gait, trial stairs, standing tolerance   PT Discharge Recommendation (DC Rec) home;home with home care physical therapy   PT Rationale for DC Rec Pt is currently below baseline mobility, though she is aware of deficits and need for assist. Once pt is able to safely demonstrate stair navigation, she will be appropriate for home disharge. She would benefit from home PT to maximize independence and establish HEP to continue building strength and endurance.   PT Brief overview of current status Amb 150 feet using FWW with CGA.   Total Session Time   Timed Code Treatment Minutes 23   Total Session Time (sum of timed and untimed services) 36      [FreeTextEntry1] : Cathleen is a 36-year-old female four pregnancy with five children, last pregnancy BP rising so induced at 37 weeks.She was having headaches during pregnancy with elevated BP.  Her BP has been high since 3/24/20. She was  started on nifedipine 30mg on 4/21/20 and increased to 60mg on Friday. Bp readings better. Not working yet.  100

## 2023-05-21 NOTE — PROGRESS NOTES
Occupational Therapy     05/21/23 0830   Appointment Info   Signing Clinician's Name / Credentials (OT) Bren Cueto OTR/L   Living Environment   People in Home alone   Current Living Arrangements house   Home Accessibility stairs within home   Number of Stairs, Within Home, Primary seven   Stair Railings, Within Home, Primary railings safe and in good condition   Living Environment Comments Patient reports she is independent; works full time as administrative assistance   Self-Care   Equipment Currently Used at Home none   Fall history within last six months yes   Number of times patient has fallen within last six months 1   General Information   Onset of Illness/Injury or Date of Surgery 05/19/23   Referring Physician Willem Ohara MD   Patient/Family Therapy Goal Statement (OT) get home   Additional Occupational Profile Info/Pertinent History of Current Problem Lesly Hutchinson is a 73 year old female with history of hypothyroidism and rheumatoid arthritis who presented to ER for left hip pain after a fall.   Existing Precautions/Restrictions 90 degree hip flexion;no hip IR;no hip ADD past midline   Left Lower Extremity (Weight-bearing Status) weight-bearing as tolerated (WBAT)   Cognitive Status Examination   Orientation Status orientation to person, place and time   Range of Motion Comprehensive   General Range of Motion no range of motion deficits identified   Strength Comprehensive (MMT)   General Manual Muscle Testing (MMT) Assessment no strength deficits identified   Bed Mobility   Bed Mobility supine-sit;sit-supine   Supine-Sit [place] (Bed Mobility) maximum assist (25% patient effort)   Comment (Bed Mobility) Initially unable, after VC patient able, see treatment below.   Transfers   Transfers sit-stand transfer   Sit-Stand Transfer   Sit-Stand [place] (Transfers) minimum assist (75% patient effort)   Sit/Stand Transfer Comments Pivot trsf to valentino wtih FWW and CGA   Activities of Daily  Living   BADL Assessment/Intervention lower body dressing;toileting   Lower Body Dressing Assessment/Training   [place] Level (Lower Body Dressing) maximum assist (25% patient effort)   Toileting   Comment, (Toileting) OT educated patient on hip precautions, patient verbalized understanding.   [place] (Toileting) minimum assist (75% patient effort)   Clinical Impression   Criteria for Skilled Therapeutic Interventions Met (OT) Yes, treatment indicated   OT Diagnosis decreased ADL independence   Influenced by the following impairments left hip fracture   OT Problem List-Impairments impacting ADL problems related to;activity tolerance impaired;balance;strength;post-surgical precautions   Assessment of Occupational Performance 3-5 Performance Deficits   Identified Performance Deficits dressing, toileting, bed mob, trsfs   Planned Therapy Interventions (OT) ADL retraining;transfer training;home program guidelines   Clinical Decision Making Complexity (OT) moderate complexity   Risk & Benefits of therapy have been explained evaluation/treatment results reviewed;care plan/treatment goals reviewed   OT Total Evaluation Time   OT Eval, Moderate Complexity Minutes (95873) 20   OT Goals   Therapy Frequency (OT) 2 times/day   OT Predicted Duration/Target Date for Goal Attainment 05/28/23   OT Goals Lower Body Dressing;Transfers;Toilet Transfer/Toileting   OT: Lower Body Dressing Modified independent;using adaptive equipment   OT: Transfer Supervision/stand-by assist   OT: Toilet Transfer/Toileting Supervision/stand-by assist;cleaning and garment management;using adaptive equipment   Self-Care/Home Management   Self-Care/Home Mgmt/ADL, Compensatory, Meal Prep Minutes (50575) 25   Treatment Detail/Skilled Intervention Eval completed, treatment initiated. Patient supine in bed when OT arrived, agreeable to therapy. VC for body mechnaics for bed mobility, able to complete with HOB elevated, bedrail and SBA. STS  from bed with VC for body mechanics. Practiced STS from chair with VC for hip precautions and CGA. Practiced LE dressing with AE (sock-aid and reacher), patient able to complete with modified independence after OT demonstration. Patient left in chair with call light and alarm on.   OT Discharge Planning   OT Plan Trsfs, toileting, bed mobility   OT Discharge Recommendation (DC Rec) home with home care occupational therapy   OT Rationale for DC Rec Patient moving well, patient's daughter may be able to assist with some IADLs as needed.   OT Brief overview of current status CGA for ADLs, trsfs   Total Session Time   Timed Code Treatment Minutes 25   Total Session Time (sum of timed and untimed services) 45

## 2023-05-21 NOTE — PROGRESS NOTES
Daily Progress Note        CODE STATUS:  Full Code    05/20/23  Assessment/Plan:  Lesly Hutchinson is a 73 year old female with history of hypothyroidism and rheumatoid arthritis who presented to ER for left hip pain after a fall.      Acute left hip fracture:   -2/2 mechanical fall. CT hip with subcapital fracture left femoral neck with minimal impaction but no significant angulation deformity.  -Pain control: Scheduled Tylenol, oxycodone as needed  -Orthopedic surgery consulted.   -S/P total hip arthroplasty 5/20. DVT prophylaxis per ortho  -Patient lives alone.  May need TCU placement.     ABL anemia  -Hgb drop from 15 to 10 noted. EBL 300ml noted.   -No e/o gi bleeding. No need for transfusion   -Monitor    Elevated blood pressure without the diagnosis of hypertension:   -Patient had elevated blood pressure up to 192/114 in ER due to anxiety.  She does not have history of hypertension.  -Well controlled today  -Hydralazine as needed.     Hypothyroidism:   -Continue PTA levothyroxine     History of rheumatoid arthritis:   -Patient reports that she takes ibuprofen as needed joint pain.        Diet:  Regular  DVT Prophylaxis: Enoxaparin (Lovenox) subcutaneous after surgery tomorrow  Donald Catheter: Not present  Lines: None     Cardiac Monitoring: None  Code Status:  Full code      Disposition; 1-2 days, may need TCU  Barrier to discharge; Surgery today.     Addendum: 2:38Pm  A rapid response was called at the rehab unit at around 2:25 when the patient got light headed, dizzy and passed out briefly after doing stairs. Afterwards, she had a small emesis. No fall reported. When I arrived, she was on a stretcher bed, alert and oriented. Already feeling much better. Blood sugar is 120. Vitals are stable. Patient is not tachycardic or hypoxic to suggest PE. No chest pain or shortness of breath.  I suspect this is a vasovagal event. EKG- NSR. Will check hgb and trop. Ok to keep her on the 4th floor for now.      Subjective:  Interval History: Patient seen and examined this morning. Working with therapy. Reports doing well. Pain is well controlled. No other issues reported. Wanted to know when she can drive to work. Deferred this to ortho.    Review of Systems:   As mentioned in subjective.    Patient Active Problem List   Diagnosis     Benign Adenomatous Polyp Of The Large Intestine     Menopause     Osteopenia with high risk of fracture     Hypothyroidism     Primary osteoarthritis of both knees     Primary osteoarthritis involving multiple joints     Wears hearing aid in both ears     Elevated BP without diagnosis of hypertension     Hyperlipidemia LDL goal <100 ascvd risk 17%     Hip fracture, left, closed, initial encounter (H)       Scheduled Meds:    acetaminophen  975 mg Oral Q8H     aspirin  81 mg Oral BID     cephALEXin  500 mg Oral Q6H NARENDRA     enoxaparin ANTICOAGULANT  40 mg Subcutaneous Q24H     gabapentin  600 mg Oral At Bedtime     levothyroxine  50 mcg Oral QAM AC     polyethylene glycol  17 g Oral Daily     senna-docusate  1 tablet Oral BID     sodium chloride (PF)  3 mL Intracatheter Q8H     Continuous Infusions:    lactated ringers       PRN Meds:.[START ON 5/23/2023] acetaminophen, sore throat, bisacodyl **OR** bisacodyl, bisacodyl, hydrALAZINE, HYDROmorphone **OR** HYDROmorphone, hydrOXYzine, ibuprofen, lidocaine 4%, lidocaine (buffered or not buffered), lidocaine (buffered or not buffered), magnesium hydroxide, melatonin, methocarbamol, naloxone **OR** naloxone **OR** naloxone **OR** naloxone, ondansetron **OR** ondansetron, oxyCODONE **OR** oxyCODONE, oxyCODONE, prochlorperazine **OR** prochlorperazine, sodium chloride (PF)    Objective:  Vital signs in last 24 hours:  Temp:  [97.7  F (36.5  C)-98.9  F (37.2  C)] 98.4  F (36.9  C)  Pulse:  [70-86] 82  Resp:  [16-25] 16  BP: (115-141)/(23-69) 129/65  SpO2:  [94 %-100 %] 95 %      No intake or output data in the 24 hours ending 05/20/23  "1102    Physical Exam:    General: Not in obvious distress.  HEENT: NC, AT   Chest: Clear to auscultation bilaterally  Heart: S1S2 normal, regular. No M/R/G  Abdomen: Soft. NT, ND. Bowel sounds- active.  Extremities: No legs swelling  Neuro: Alert and awake, grossly non-focal      Lab Results:(I have personally reviewed the results)    Recent Results (from the past 24 hour(s))   Hemoglobin    Collection Time: 05/21/23  5:00 AM   Result Value Ref Range    Hemoglobin 10.5 (L) 11.7 - 15.7 g/dL   Extra Green Top (Lithium Heparin) Tube    Collection Time: 05/21/23  5:00 AM   Result Value Ref Range    Hold Specimen JIC        All laboratory and imaging data in the past 24 hours reviewed  Serum Glucose range:   Recent Labs   Lab 05/19/23  1808   *     ABG: No lab results found in last 7 days.  CBC:   Recent Labs   Lab 05/21/23  0500 05/19/23  1808   WBC  --  17.7*   HGB 10.5* 15.4   HCT  --  46.6   MCV  --  93   PLT  --  246   NEUTROPHIL  --  88   LYMPH  --  7   MONOCYTE  --  4   EOSINOPHIL  --  0     Chemistry:   Recent Labs   Lab 05/19/23  1808      POTASSIUM 3.6   CHLORIDE 99   CO2 24   BUN 24.3*   CR 0.84   GFRESTIMATED 73   ELTON 9.9   PROTTOTAL 8.3   ALBUMIN 4.7   AST 28   ALT 25   ALKPHOS 76   BILITOTAL 0.5     Coags:  No results for input(s): INR, PROTIME, PTT in the last 168 hours.    Invalid input(s): APTT  Cardiac Markers:  No results for input(s): CKTOTAL, TROPONINI in the last 168 hours.       POC US Guidance Needle Placement    Result Date: 5/20/2023  Ultrasound was performed as guidance to an anesthesia procedure.  Click \"PACS images\" hyperlink below to view any stored images.  For specific procedure details, view procedure note authored by anesthesia.    POC US Guidance Needle Placement    Result Date: 5/20/2023  Ultrasound was performed as guidance to an anesthesia procedure.  Click \"PACS images\" hyperlink below to view any stored images.  For specific procedure details, view procedure note " "authored by anesthesia.    POC US Guidance Needle Placement    Result Date: 5/20/2023  Ultrasound was performed as guidance to an anesthesia procedure.  Click \"PACS images\" hyperlink below to view any stored images.  For specific procedure details, view procedure note authored by anesthesia.    CT Hip Left w/o Contrast    Result Date: 5/19/2023  EXAM: CT HIP LEFT W/O CONTRAST LOCATION: [facility] DATE/TIME: 5/19/2023 6:21 PM CDT INDICATION: Left femoral neck fracture. Surgical planning purposes. COMPARISON: None. TECHNIQUE: Noncontrast. Axial, sagittal and coronal thin-section reconstruction. Dose reduction techniques were used. FINDINGS: BONES: -Subcapital fracture left femoral neck. Minimal impaction. No evidence for intertrochanteric extension. Degenerative change at the left hip joint itself. No dislocation. Degenerative change at the left SI joint. No additional fractures are identified within the visualized portion of the left hemipelvis. SOFT TISSUES: -Fatty infiltration and atrophy of the left gluteus minimus and medius. No evidence for significant effusion. No evidence for significant hematoma or fluid collection about the fracture.     IMPRESSION: 1.  Subcapital fracture left femoral neck with minimal impaction but no significant angulation deformity. No evidence for intertrochanteric extension. 2.  Degenerative change left hip joint. 3.  Visualized left hemipelvis negative for fracture. 4.  Degenerative change at the left SI joint.       Latest radiology report personally reviewed.    Note created using dragon voice recognition software so sounds alike errors may have escaped editing.      05/21/2023   Jorge Diallo MD  Hospitalist, [facility]  Pager: [phone]                "

## 2023-05-21 NOTE — PROGRESS NOTES
Care Management Follow Up     Length of Stay (days): 2     Expected Discharge Date: 05/22/2023     Concerns to be Addressed:     Patient underwent surgery yesterday. Will await therapy evaluations and recommendations for discharge needs.  Patient plan of care discussed at interdisciplinary rounds: Yes     Anticipated Discharge Disposition:  To be determined once evaluated by therapy post-operatively     Anticipated Discharge Services:  To be determined - possible need for TCU  Anticipated Discharge DME:  As per therapy recommendations     Patient/family educated on Medicare website which has current facility and service quality ratings:    Education Provided on the Discharge Plan:    Patient/Family in Agreement with the Plan:       Referrals Placed by CM/SW:  None as of yet  Private pay costs discussed: Not applicable     Additional Information:  Patient is an active 74 yo female who normally works and lives independently. She fell and sustained a hip fracture which was repaired yesterday. She is WBAT. CM to await therapy evaluations and recommendations for discharge.      Lona Barros LGSW

## 2023-05-21 NOTE — PROGRESS NOTES
Orthopaedic Surgery Progress Note   5/21/2023    Subjective: No acute events overnight. Pain well controlled. Tolerating diet. Voiding spontaneously. Denies fever or chills, CP, SOB, numbness or tingling, motor dysfunction or weakness.  No reported concerns from the nursing team overnight.  Overall seems to be doing well this morning.    Objective: /65 (BP Location: Left arm)   Pulse 82   Temp 98.4  F (36.9  C) (Oral)   Resp 16   Wt 63 kg (138 lb 14.2 oz)   SpO2 95%   BMI 23.84 kg/m      General: NAD, alert and oriented, cooperative with exam.   Cardio: RRR, extremities wwp.   Respiratory: Non-labored breathing.  MSK: LLE: Toes wwp, DP 2+, bcr in all toes. Compartments soft at the thigh. +EHL/FHL/GSC/TA. SILT SP/DP/Sa/[place]/T. Dressing c/d/i.     Labs:  Lab Results   Component Value Date    WBC 17.7 (H) 05/19/2023    HGB 10.5 (L) 05/21/2023     05/19/2023     Assessment and Plan: Lesly Hutchinson is a 73 year old female now POD 1 s/p left total hip arthroplasty for femoral neck fracture. Doing well post-operatively.     Activity: Up with assist and assistive device until independent.  --Posterior hip precautions  Weight bearing status: WBAT LLE.  Pain management: Transition from IV to PO as tolerated.    Antibiotics: Ancef x 24 hrs, PO kefle while in patient and discharge of 2 weeks of oral cefadroxil  Diet: Begin with clear fluids and progress diet as tolerated.   DVT prophylaxis: ASA and mechanical while in the hospital  Dressings: Keep clean, dry and intact until follow up.   Physical Therapy/Occupational Therapy: Eval and treat.  Follow-up: Clinic in 2 weeks with Dr. Ohara team.   Disposition: Pending progress with therapies, pain control on orals, and medical stability    Natalio Romano MD  Orthopedic Surgery

## 2023-05-21 NOTE — PLAN OF CARE
"  Problem: Plan of Care - These are the overarching goals to be used throughout the patient stay.    Goal: Plan of Care Review  Description: The Plan of Care Review/Shift note should be completed every shift.  The Outcome Evaluation is a brief statement about your assessment that the patient is improving, declining, or no change.  This information will be displayed automatically on your shift note.  Outcome: Progressing  Goal: Patient-Specific Goal (Individualized)  Description: You can add care plan individualizations to a care plan. Examples of Individualization might be:  \"Parent requests to be called daily at 9am for status\", \"I have a hard time hearing out of my right ear\", or \"Do not touch me to wake me up as it startles me\".  Outcome: Progressing  Goal: Absence of Hospital-Acquired Illness or Injury  Outcome: Progressing  Intervention: Identify and Manage Fall Risk  Recent Flowsheet Documentation  Taken 5/21/2023 0100 by Brandy Gray, RN  Safety Promotion/Fall Prevention: activity supervised  Intervention: Prevent Skin Injury  Recent Flowsheet Documentation  Taken 5/21/2023 0100 by Brandy Gray, RN  Body Position: position maintained  Goal: Optimal Comfort and Wellbeing  Outcome: Progressing  Goal: Readiness for Transition of Care  Outcome: Progressing   Goal Outcome Evaluation:      Pt denies pain except with movement. Declines prn pain medication. Scheduled tylenol and toradol given. Pt reports full sensation in left leg. Block appears to have worn off.                  "

## 2023-05-22 ENCOUNTER — APPOINTMENT (OUTPATIENT)
Dept: OCCUPATIONAL THERAPY | Facility: HOSPITAL | Age: 73
DRG: 522 | End: 2023-05-22
Payer: COMMERCIAL

## 2023-05-22 ENCOUNTER — APPOINTMENT (OUTPATIENT)
Dept: ULTRASOUND IMAGING | Facility: HOSPITAL | Age: 73
DRG: 522 | End: 2023-05-22
Attending: INTERNAL MEDICINE
Payer: COMMERCIAL

## 2023-05-22 ENCOUNTER — APPOINTMENT (OUTPATIENT)
Dept: PHYSICAL THERAPY | Facility: HOSPITAL | Age: 73
DRG: 522 | End: 2023-05-22
Payer: COMMERCIAL

## 2023-05-22 ENCOUNTER — APPOINTMENT (OUTPATIENT)
Dept: CARDIOLOGY | Facility: HOSPITAL | Age: 73
DRG: 522 | End: 2023-05-22
Attending: INTERNAL MEDICINE
Payer: COMMERCIAL

## 2023-05-22 LAB
CREAT SERPL-MCNC: 0.88 MG/DL (ref 0.51–0.95)
ERYTHROCYTE [DISTWIDTH] IN BLOOD BY AUTOMATED COUNT: 12.4 % (ref 10–15)
GFR SERPL CREATININE-BSD FRML MDRD: 69 ML/MIN/1.73M2
GLUCOSE BLDC GLUCOMTR-MCNC: 97 MG/DL (ref 70–99)
HCT VFR BLD AUTO: 29.6 % (ref 35–47)
HGB BLD-MCNC: 9.9 G/DL (ref 11.7–15.7)
LVEF ECHO: NORMAL
MCH RBC QN AUTO: 30.7 PG (ref 26.5–33)
MCHC RBC AUTO-ENTMCNC: 33.4 G/DL (ref 31.5–36.5)
MCV RBC AUTO: 92 FL (ref 78–100)
PLATELET # BLD AUTO: 146 10E3/UL (ref 150–450)
RBC # BLD AUTO: 3.22 10E6/UL (ref 3.8–5.2)
WBC # BLD AUTO: 8.3 10E3/UL (ref 4–11)

## 2023-05-22 PROCEDURE — 93306 TTE W/DOPPLER COMPLETE: CPT

## 2023-05-22 PROCEDURE — 97535 SELF CARE MNGMENT TRAINING: CPT | Mod: GO

## 2023-05-22 PROCEDURE — 97530 THERAPEUTIC ACTIVITIES: CPT | Mod: GP

## 2023-05-22 PROCEDURE — 97116 GAIT TRAINING THERAPY: CPT | Mod: GP

## 2023-05-22 PROCEDURE — 120N000001 HC R&B MED SURG/OB

## 2023-05-22 PROCEDURE — 93880 EXTRACRANIAL BILAT STUDY: CPT

## 2023-05-22 PROCEDURE — 97110 THERAPEUTIC EXERCISES: CPT | Mod: GP

## 2023-05-22 PROCEDURE — 82565 ASSAY OF CREATININE: CPT | Performed by: INTERNAL MEDICINE

## 2023-05-22 PROCEDURE — 250N000013 HC RX MED GY IP 250 OP 250 PS 637: Performed by: INTERNAL MEDICINE

## 2023-05-22 PROCEDURE — 36415 COLL VENOUS BLD VENIPUNCTURE: CPT | Performed by: INTERNAL MEDICINE

## 2023-05-22 PROCEDURE — 93306 TTE W/DOPPLER COMPLETE: CPT | Mod: 26 | Performed by: INTERNAL MEDICINE

## 2023-05-22 PROCEDURE — 99233 SBSQ HOSP IP/OBS HIGH 50: CPT | Performed by: INTERNAL MEDICINE

## 2023-05-22 PROCEDURE — 85014 HEMATOCRIT: CPT | Performed by: INTERNAL MEDICINE

## 2023-05-22 PROCEDURE — 250N000013 HC RX MED GY IP 250 OP 250 PS 637: Performed by: ORTHOPAEDIC SURGERY

## 2023-05-22 RX ORDER — AMOXICILLIN 250 MG
1 CAPSULE ORAL 2 TIMES DAILY
Qty: 60 TABLET | Refills: 0 | Status: SHIPPED | OUTPATIENT
Start: 2023-05-22 | End: 2024-03-08

## 2023-05-22 RX ORDER — ACETAMINOPHEN 325 MG/1
650 TABLET ORAL EVERY 4 HOURS PRN
Qty: 100 TABLET | Refills: 0 | Status: SHIPPED | OUTPATIENT
Start: 2023-05-23 | End: 2024-03-08

## 2023-05-22 RX ORDER — OXYCODONE HYDROCHLORIDE 5 MG/1
2.5-5 TABLET ORAL EVERY 4 HOURS PRN
Qty: 15 TABLET | Refills: 0 | Status: SHIPPED | OUTPATIENT
Start: 2023-05-22 | End: 2023-05-23

## 2023-05-22 RX ORDER — HYDROXYZINE HYDROCHLORIDE 10 MG/1
10 TABLET, FILM COATED ORAL EVERY 6 HOURS PRN
Qty: 60 TABLET | Refills: 0 | Status: SHIPPED | OUTPATIENT
Start: 2023-05-22 | End: 2024-03-08

## 2023-05-22 RX ADMIN — ACETAMINOPHEN 975 MG: 325 TABLET ORAL at 00:17

## 2023-05-22 RX ADMIN — GABAPENTIN 600 MG: 300 CAPSULE ORAL at 21:53

## 2023-05-22 RX ADMIN — ASPIRIN 81 MG: 81 TABLET, COATED ORAL at 08:00

## 2023-05-22 RX ADMIN — IBUPROFEN 600 MG: 600 TABLET, FILM COATED ORAL at 21:58

## 2023-05-22 RX ADMIN — IBUPROFEN 600 MG: 600 TABLET, FILM COATED ORAL at 10:38

## 2023-05-22 RX ADMIN — CEPHALEXIN 500 MG: 500 CAPSULE ORAL at 18:35

## 2023-05-22 RX ADMIN — LEVOTHYROXINE SODIUM 50 MCG: 0.03 TABLET ORAL at 06:22

## 2023-05-22 RX ADMIN — POLYETHYLENE GLYCOL 3350 17 G: 17 POWDER, FOR SOLUTION ORAL at 07:59

## 2023-05-22 RX ADMIN — SENNOSIDES AND DOCUSATE SODIUM 1 TABLET: 50; 8.6 TABLET ORAL at 08:00

## 2023-05-22 RX ADMIN — CEPHALEXIN 500 MG: 500 CAPSULE ORAL at 00:16

## 2023-05-22 RX ADMIN — CEPHALEXIN 500 MG: 500 CAPSULE ORAL at 11:05

## 2023-05-22 RX ADMIN — BISACODYL 5 MG: 5 TABLET, COATED ORAL at 18:49

## 2023-05-22 RX ADMIN — ACETAMINOPHEN 975 MG: 325 TABLET ORAL at 16:21

## 2023-05-22 RX ADMIN — CEPHALEXIN 500 MG: 500 CAPSULE ORAL at 05:45

## 2023-05-22 RX ADMIN — ACETAMINOPHEN 975 MG: 325 TABLET ORAL at 08:00

## 2023-05-22 RX ADMIN — SENNOSIDES AND DOCUSATE SODIUM 1 TABLET: 50; 8.6 TABLET ORAL at 21:54

## 2023-05-22 RX ADMIN — ASPIRIN 81 MG: 81 TABLET, COATED ORAL at 21:54

## 2023-05-22 ASSESSMENT — ACTIVITIES OF DAILY LIVING (ADL)
ADLS_ACUITY_SCORE: 37
ADLS_ACUITY_SCORE: 35
ADLS_ACUITY_SCORE: 37
ADLS_ACUITY_SCORE: 37
ADLS_ACUITY_SCORE: 35
ADLS_ACUITY_SCORE: 37

## 2023-05-22 NOTE — PLAN OF CARE
"  Problem: Plan of Care - These are the overarching goals to be used throughout the patient stay.    Goal: Plan of Care Review  Description: The Plan of Care Review/Shift note should be completed every shift.  The Outcome Evaluation is a brief statement about your assessment that the patient is improving, declining, or no change.  This information will be displayed automatically on your shift note.  Outcome: Progressing     Problem: Plan of Care - These are the overarching goals to be used throughout the patient stay.    Goal: Patient-Specific Goal (Individualized)  Description: You can add care plan individualizations to a care plan. Examples of Individualization might be:  \"Parent requests to be called daily at 9am for status\", \"I have a hard time hearing out of my right ear\", or \"Do not touch me to wake me up as it startles me\".  Outcome: Progressing     Problem: Plan of Care - These are the overarching goals to be used throughout the patient stay.    Goal: Optimal Comfort and Wellbeing  Intervention: Monitor Pain and Promote Comfort  Recent Flowsheet Documentation  Taken 5/22/2023 0017 by Ami Chapman RN  Pain Management Interventions: medication (see MAR)     Problem: Plan of Care - These are the overarching goals to be used throughout the patient stay.    Goal: Readiness for Transition of Care  Outcome: Progressing     Problem: Hip Fracture Medical Management  Goal: Optimal Pain Control and Function  Intervention: Manage Acute Orthopaedic-Related Pain  Recent Flowsheet Documentation  Taken 5/22/2023 0017 by Ami Chapman, RN  Pain Management Interventions: medication (see MAR)   Goal Outcome Evaluation:       Pt is alert and oriented x4. Ambulatory using walker A1 going to the bathroom. No c/o pain, oral Tylenol effective. Continent with bowel and bladder. VS WNL. Comfortable and slept good.                 "

## 2023-05-22 NOTE — PLAN OF CARE
"  Problem: Hip Fracture Medical Management  Goal: Optimal Pain Control and Function  Outcome: Progressing  Intervention: Manage Acute Orthopaedic-Related Pain  Recent Flowsheet Documentation  Taken 5/21/2023 1900 by Rosita Subramanian, RN  Pain Management Interventions: (pt does not want narcs) declines   Goal Outcome Evaluation:  Pt up to bathroom x 2 with SBA this shift. Pt denies pain unless pt is up and ambulating or moving. Pt requested PRN pain medication at HS after ambulating to bathroom for pain rated at a \"7\". Pt denies pain at rest. Cont to monitor and assist.                       "

## 2023-05-22 NOTE — PROGRESS NOTES
Orthopaedic Surgery Progress Note   5/21/2023    Subjective: Patient had vasovagal event last night after doing stairs with PT.  Cardiac eval today. Pain well controlled. Tolerating diet. Voiding spontaneously. Denies fever or chills, CP, SOB, numbness or tingling, motor dysfunction or weakness.  No reported concerns from the nursing team overnight.  Overall seems to be doing well this morning.    Objective: /57 (BP Location: Left arm)   Pulse 73   Temp 99  F (37.2  C) (Oral)   Resp 16   Wt 63 kg (138 lb 14.2 oz)   SpO2 96%   BMI 23.84 kg/m      General: NAD, alert and oriented, cooperative with exam.   Cardio: RRR, extremities wwp.   Respiratory: Non-labored breathing.  MSK: LLE: Toes wwp, DP 2+, bcr in all toes. Compartments soft at the thigh. +EHL/FHL/GSC/TA. SILT SP/DP/Sa/Butler/T. Dressing c/d/i.     Labs:  Lab Results   Component Value Date    WBC 8.3 05/22/2023    HGB 9.9 (L) 05/22/2023     (L) 05/22/2023     Assessment and Plan: Lesly Hutchinson is a 73 year old female now POD 2 s/p left total hip arthroplasty for femoral neck fracture. Doing well post-operatively.     Activity: Up with assist and assistive device until independent.  --Posterior hip precautions  Weight bearing status: WBAT LLE.  Pain management: Transition from IV to PO as tolerated.    Antibiotics: Ancef x 24 hrs, PO kefle while in patient and discharge of 2 weeks of oral cefadroxil  Diet: Begin with clear fluids and progress diet as tolerated.   DVT prophylaxis: ASA and mechanical while in the hospital  Dressings: Keep clean, dry and intact until follow up.   Physical Therapy/Occupational Therapy: Eval and treat.  Follow-up: Clinic in 2 weeks with Dr. Ohara team.   Disposition: Discharge pending further work with PT, medicine clearance.  Plan to discharge home    Chad Garcia PA-C  Orthopedic Surgery

## 2023-05-22 NOTE — PLAN OF CARE
Occupational Therapy Discharge Summary    Reason for therapy discharge:    Discharged to remains in hospital    Progress towards therapy goal(s). See goals on Care Plan in [facility] electronic health record for goal details.  Goals met    Therapy recommendation(s):    Patient would benefit from home care OT services. Patient safe to discharge home

## 2023-05-22 NOTE — PLAN OF CARE
Problem: Plan of Care - These are the overarching goals to be used throughout the patient stay.    Goal: Plan of Care Review  Description: The Plan of Care Review/Shift note should be completed every shift.  The Outcome Evaluation is a brief statement about your assessment that the patient is improving, declining, or no change.  This information will be displayed automatically on your shift note.  Outcome: Progressing     Problem: Plan of Care - These are the overarching goals to be used throughout the patient stay.    Goal: Absence of Hospital-Acquired Illness or Injury  Intervention: Identify and Manage Fall Risk  Recent Flowsheet Documentation  Taken 5/22/2023 1000 by Krunal Dumont, RN  Safety Promotion/Fall Prevention: activity supervised     Problem: Plan of Care - These are the overarching goals to be used throughout the patient stay.    Goal: Absence of Hospital-Acquired Illness or Injury  Intervention: Prevent Skin Injury  Recent Flowsheet Documentation  Taken 5/22/2023 1000 by Krunal Dumont, RN  Body Position: position changed independently   Goal Outcome Evaluation:       Ibuprofen given for left hip pain which was effective, assist of 1 ambulation, continue PO ABX's. Echocardiogram scheduled.

## 2023-05-22 NOTE — PROGRESS NOTES
[facility]    PROGRESS NOTE - Hospitalist Service    Assessment and Plan  Patient is new to me, Lesly Hutchinson is a 73 year old female with a past medical history of hypothyroidism and rheumatoid arthritis who presented to ER for left hip pain after a fall.  Found to have hip fracture     Acute left hip fracture:   - Secondary to mechanical fall.   - CT hip with subcapital fracture left femoral neck with minimal impaction but no significant angulation deformity.  - Pain control: Scheduled Tylenol, oxycodone as needed  - Orthopedic surgery consulted.   Appreciate input  -S/P total hip arthroplasty 5/20.   - POD#2  - DVT prophylaxis per ortho, aspirin twice daily  - Patient lives alone.  May need TCU placement.    Syncopal episode  - Patient had episode of syncope while doing physical therapy  - Etiology is probably possibly  - Start telemetry monitoring  - Check echo and carotid ultrasound     Acute blood loss anemia  - Hgb drop from 15 to 10 noted. EBL 300ml noted.   - No e/o gi bleeding. No need for transfusion at this point  - Continue to monitor hemoglobin     Elevated blood pressure without the diagnosis of hypertension:   - Patient had elevated blood pressure up to 192/114 in ER due to anxiety and/or pain.    - She does not have history of hypertension.  - Well controlled today  - Hydralazine as needed.     Hypothyroidism:   -Continue PTA levothyroxine     History of rheumatoid arthritis:   - Patient reports that she takes ibuprofen as needed joint pain      50 MINUTES SPENT BY ME on the date of service doing chart review, history, exam, documentation & further activities per the note    Principal Problem:    Hip fracture, left, closed, initial encounter (H)      VTE prophylaxis: Aspirin twice daily and SCDs  DIET: Orders Placed This Encounter      Advance Diet as Tolerated: Regular Diet Adult      Discharge Instruction - Regular Diet Adult      Disposition/Barriers to discharge:  Home/TCU, postsurgical care, syncope work-up.  Code Status: Full Code    Subjective:  Lesly is feeling better today, denies any chest pain or shortness of breath.  Hip pain is controlled.    PHYSICAL EXAM  Vitals:    05/19/23 1746   Weight: 63 kg (138 lb 14.2 oz)     B/P:114/57 T:99 P:73 R:16     Intake/Output Summary (Last 24 hours) at 5/22/2023 1443  Last data filed at 5/22/2023 0800  Gross per 24 hour   Intake 846 ml   Output 300 ml   Net 546 ml      Body mass index is 23.84 kg/m .    Constitutional: awake, alert, cooperative, no apparent distress, and appears stated age  Respiratory: No increased work of breathing, good air exchange, clear to auscultation bilaterally, no crackles or wheezing  Cardiovascular: Normal apical impulse, regular rate and rhythm, normal S1 and S2, no S3 or S4, and no murmur noted  GI: No scars, normal bowel sounds, soft, non-distended, non-tender, no masses palpated, no hepatosplenomegally  Skin: no bruising or bleeding and normal skin color, texture, turgor  Musculoskeletal: There is no redness, warmth, or swelling of the joints.  Full range of motion noted.  no lower extremity pitting edema present, intact surgical incision on left hip covered with dressing.  Neurologic: Awake, alert, oriented to name, place and time.  Cranial nerves II-XII are grossly intact.  Motor is 5 out of 5 bilaterally.   Sensory is intact.    Neuropsychiatric: Appropriate with examiner      PERTINENT LABS/IMAGING:    I have personally reviewed the following data over the past 24 hrs:    8.3  \   9.9 (L)   / 146 (L)     N/A N/A N/A /  97   N/A N/A 0.88 \       Trop: 8 BNP: N/A       Imaging results reviewed over the past 24 hrs:     Discussed with patient, family, orthopedics, nursing staff and discharge planner    Adam Kumar MD  [facility] Medicine Service  [phone]

## 2023-05-23 ENCOUNTER — APPOINTMENT (OUTPATIENT)
Dept: PHYSICAL THERAPY | Facility: HOSPITAL | Age: 73
DRG: 522 | End: 2023-05-23
Payer: COMMERCIAL

## 2023-05-23 LAB
ANION GAP SERPL CALCULATED.3IONS-SCNC: 9 MMOL/L (ref 7–15)
BUN SERPL-MCNC: 17.5 MG/DL (ref 8–23)
CALCIUM SERPL-MCNC: 8.7 MG/DL (ref 8.8–10.2)
CHLORIDE SERPL-SCNC: 106 MMOL/L (ref 98–107)
CREAT SERPL-MCNC: 0.76 MG/DL (ref 0.51–0.95)
DEPRECATED HCO3 PLAS-SCNC: 25 MMOL/L (ref 22–29)
ERYTHROCYTE [DISTWIDTH] IN BLOOD BY AUTOMATED COUNT: 12.5 % (ref 10–15)
GFR SERPL CREATININE-BSD FRML MDRD: 82 ML/MIN/1.73M2
GLUCOSE SERPL-MCNC: 100 MG/DL (ref 70–99)
HCT VFR BLD AUTO: 29.3 % (ref 35–47)
HGB BLD-MCNC: 9.4 G/DL (ref 11.7–15.7)
MAGNESIUM SERPL-MCNC: 1.8 MG/DL (ref 1.7–2.3)
MCH RBC QN AUTO: 29.6 PG (ref 26.5–33)
MCHC RBC AUTO-ENTMCNC: 32.1 G/DL (ref 31.5–36.5)
MCV RBC AUTO: 92 FL (ref 78–100)
PLATELET # BLD AUTO: 158 10E3/UL (ref 150–450)
POTASSIUM SERPL-SCNC: 3.9 MMOL/L (ref 3.4–5.3)
RBC # BLD AUTO: 3.18 10E6/UL (ref 3.8–5.2)
SODIUM SERPL-SCNC: 140 MMOL/L (ref 136–145)
WBC # BLD AUTO: 6.6 10E3/UL (ref 4–11)

## 2023-05-23 PROCEDURE — 36415 COLL VENOUS BLD VENIPUNCTURE: CPT | Performed by: INTERNAL MEDICINE

## 2023-05-23 PROCEDURE — 120N000001 HC R&B MED SURG/OB

## 2023-05-23 PROCEDURE — 97530 THERAPEUTIC ACTIVITIES: CPT | Mod: GP | Performed by: PHYSICAL THERAPIST

## 2023-05-23 PROCEDURE — 99232 SBSQ HOSP IP/OBS MODERATE 35: CPT | Performed by: INTERNAL MEDICINE

## 2023-05-23 PROCEDURE — 250N000013 HC RX MED GY IP 250 OP 250 PS 637: Performed by: ORTHOPAEDIC SURGERY

## 2023-05-23 PROCEDURE — 250N000013 HC RX MED GY IP 250 OP 250 PS 637: Performed by: INTERNAL MEDICINE

## 2023-05-23 PROCEDURE — 97116 GAIT TRAINING THERAPY: CPT | Mod: GP | Performed by: PHYSICAL THERAPIST

## 2023-05-23 PROCEDURE — 82310 ASSAY OF CALCIUM: CPT | Performed by: INTERNAL MEDICINE

## 2023-05-23 PROCEDURE — 85014 HEMATOCRIT: CPT | Performed by: INTERNAL MEDICINE

## 2023-05-23 PROCEDURE — 83735 ASSAY OF MAGNESIUM: CPT | Performed by: INTERNAL MEDICINE

## 2023-05-23 RX ORDER — BISACODYL 10 MG
10 SUPPOSITORY, RECTAL RECTAL DAILY
Status: DISCONTINUED | OUTPATIENT
Start: 2023-05-23 | End: 2023-05-24 | Stop reason: HOSPADM

## 2023-05-23 RX ORDER — OXYCODONE HYDROCHLORIDE 5 MG/1
2.5-5 TABLET ORAL EVERY 4 HOURS PRN
Qty: 26 TABLET | Refills: 0 | Status: SHIPPED | OUTPATIENT
Start: 2023-05-23 | End: 2024-03-08

## 2023-05-23 RX ORDER — POLYETHYLENE GLYCOL 3350 17 G/17G
17 POWDER, FOR SOLUTION ORAL 2 TIMES DAILY
Status: DISCONTINUED | OUTPATIENT
Start: 2023-05-23 | End: 2023-05-24 | Stop reason: HOSPADM

## 2023-05-23 RX ADMIN — BISACODYL 10 MG: 10 SUPPOSITORY RECTAL at 10:27

## 2023-05-23 RX ADMIN — LEVOTHYROXINE SODIUM 50 MCG: 0.03 TABLET ORAL at 06:06

## 2023-05-23 RX ADMIN — POLYETHYLENE GLYCOL 3350 17 G: 17 POWDER, FOR SOLUTION ORAL at 08:32

## 2023-05-23 RX ADMIN — ACETAMINOPHEN 975 MG: 325 TABLET ORAL at 00:32

## 2023-05-23 RX ADMIN — GABAPENTIN 600 MG: 300 CAPSULE ORAL at 21:15

## 2023-05-23 RX ADMIN — IBUPROFEN 600 MG: 600 TABLET, FILM COATED ORAL at 10:26

## 2023-05-23 RX ADMIN — ASPIRIN 81 MG: 81 TABLET, COATED ORAL at 21:14

## 2023-05-23 RX ADMIN — CEPHALEXIN 500 MG: 500 CAPSULE ORAL at 12:49

## 2023-05-23 RX ADMIN — CEPHALEXIN 500 MG: 500 CAPSULE ORAL at 23:53

## 2023-05-23 RX ADMIN — ACETAMINOPHEN 975 MG: 325 TABLET ORAL at 08:32

## 2023-05-23 RX ADMIN — ASPIRIN 81 MG: 81 TABLET, COATED ORAL at 08:32

## 2023-05-23 RX ADMIN — SENNOSIDES AND DOCUSATE SODIUM 1 TABLET: 50; 8.6 TABLET ORAL at 08:32

## 2023-05-23 RX ADMIN — CEPHALEXIN 500 MG: 500 CAPSULE ORAL at 05:53

## 2023-05-23 RX ADMIN — CEPHALEXIN 500 MG: 500 CAPSULE ORAL at 00:32

## 2023-05-23 RX ADMIN — CEPHALEXIN 500 MG: 500 CAPSULE ORAL at 18:04

## 2023-05-23 ASSESSMENT — ACTIVITIES OF DAILY LIVING (ADL)
ADLS_ACUITY_SCORE: 37

## 2023-05-23 NOTE — PLAN OF CARE
Problem: Plan of Care - These are the overarching goals to be used throughout the patient stay.    Goal: Plan of Care Review  Description: The Plan of Care Review/Shift note should be completed every shift.  The Outcome Evaluation is a brief statement about your assessment that the patient is improving, declining, or no change.  This information will be displayed automatically on your shift note.  5/23/2023 1652 by Krunal Dumont, RN  Outcome: Progressing     Problem: Plan of Care - These are the overarching goals to be used throughout the patient stay.    Goal: Absence of Hospital-Acquired Illness or Injury  Intervention: Identify and Manage Fall Risk  Recent Flowsheet Documentation  Taken 5/23/2023 1600 by Krunal Dumont, RN  Safety Promotion/Fall Prevention: activity supervised  Taken 5/23/2023 1335 by Krunal Dumont, RN  Safety Promotion/Fall Prevention: activity supervised  Taken 5/23/2023 1017 by Krunal Dumont, RN  Safety Promotion/Fall Prevention: activity supervised   Goal Outcome Evaluation:  Patient assist of 1 to bathroom, telemetry discontinued, surgical incision intact. VS WDL.

## 2023-05-23 NOTE — PROGRESS NOTES
Orthopaedic Surgery Progress Note   5/23/2023    Subjective: Patient reports that PT went well this morning. Daughter, Jessica, is at bedside today. Denies any lightheadedness, dizziness, nausea, vomiting. Reports she has been passing gas, but no BM yet. She's open to trying suppository. Discussed bowel plan for when she discharges. Overall, she's doing quite well. Questions and concerns answered today.     Objective: /64 (BP Location: Right arm)   Pulse 71   Temp 98.4  F (36.9  C) (Oral)   Resp 17   Wt 63 kg (138 lb 14.2 oz)   SpO2 98%   BMI 23.84 kg/m      General: NAD, alert and oriented, cooperative with exam.   Cardio: RRR, extremities wwp.   Respiratory: Non-labored breathing.  MSK: LLE: Toes wwp, DP 2+, bcr in all toes. Compartments soft at the thigh. +EHL/FHL/GSC/TA. SILT SP/DP/Sa/Butler/T. Dressing c/d/i.     Labs:  Lab Results   Component Value Date    WBC 6.6 05/23/2023    HGB 9.4 (L) 05/23/2023     05/23/2023     Assessment and Plan: Lesly Hutchinson is a 73 year old female now POD #3 s/p left total hip arthroplasty for femoral neck fracture. Doing well post-operatively.     Activity: Up with assist and assistive device until independent.  --Posterior hip precautions: no internal rotation, no adduction past midline, no flexion past 90 degrees.  Weight bearing status: WBAT LLE.  Antibiotics: upon discharge, 2 week of PO Duricef 500 mg BID  Diet: Begin with clear fluids and progress diet as tolerated.   DVT prophylaxis: aspirin 81 mg twice daily for 1 month  Dressings: Keep clean, dry and intact until follow up.   Physical Therapy/Occupational Therapy: Eval and treat.  Follow-up: Clinic in 2 weeks with Dr. Ohara team.   Disposition: stable to discharge, home with home therapies    Edith Wood PA-C  [facility]  5/23/2023 at 10:03 AM

## 2023-05-23 NOTE — PROGRESS NOTES
"Care Management Follow Up    Length of Stay (days): 4    Expected Discharge Date: 05/23/2023     Concerns to be Addressed: discharge planning        Patient plan of care discussed at interdisciplinary rounds: Yes    Anticipated Discharge Disposition:  Home care      Anticipated Discharge Services:  Home Care     Education Provided on the Discharge Plan:  Per Care Team   Patient/Family in Agreement with the Plan:  Yes    Referrals Placed by CM/SW:    Private pay costs discussed: Not applicable    Additional Information:  Chart reviewed.    Social Hx:   \"Patient is an active 72 yo female who normally works and lives independently. She fell and sustained a hip fracture which was repaired yesterday. She is WBAT.\"      CM updates:  Therapy recs home with assist, and home care.     Pt states she will likely be staying at her brother [place] for a couple days before returning home.     She is open to the home care idea.    RNCM sent home care referral (pending).      Pts brother lives in [place] and pt lives in [facility], this was placed in home care referral.      [facility] accepted pt for home PT/OT.       CM will continue to follow plan of care, review recommendations, and assist with any discharge needs anticipated.     Luana Zimmerman RN        "

## 2023-05-23 NOTE — PLAN OF CARE
Problem: Plan of Care - These are the overarching goals to be used throughout the patient stay.    Goal: Plan of Care Review  Description: The Plan of Care Review/Shift note should be completed every shift.  The Outcome Evaluation is a brief statement about your assessment that the patient is improving, declining, or no change.  This information will be displayed automatically on your shift note.  Outcome: Progressing     Problem: Plan of Care - These are the overarching goals to be used throughout the patient stay.    Goal: Absence of Hospital-Acquired Illness or Injury  Intervention: Identify and Manage Fall Risk  Recent Flowsheet Documentation  Taken 5/23/2023 0032 by Ami Chapman RN  Safety Promotion/Fall Prevention:   activity supervised   assistive device/personal items within reach   clutter free environment maintained   lighting adjusted   mobility aid in reach   nonskid shoes/slippers when out of bed   patient and family education   safety round/check completed   supervised activity     Problem: Plan of Care - These are the overarching goals to be used throughout the patient stay.    Goal: Optimal Comfort and Wellbeing  Outcome: Progressing     Problem: Hip Fracture Medical Management  Goal: Absence of Bleeding  Outcome: Progressing   Goal Outcome Evaluation:       Pt is alert and oriented. Relaxed and comfortable. VS WNL. A1 stanby assist to bathroom. Pt slept good. Tele on normal Sinus rhythm. Dressing to left hip is intact and clean.

## 2023-05-23 NOTE — PROGRESS NOTES
[facility]    PROGRESS NOTE - Hospitalist Service    Assessment and Plan  73 year old female with a past medical history of hypothyroidism and rheumatoid arthritis who presented to ER for left hip pain after a fall.  Found to have hip fracture     Acute left hip fracture:   - Secondary to mechanical fall.   - CT hip with subcapital fracture left femoral neck with minimal impaction but no significant angulation deformity.  - Pain control: Scheduled Tylenol, oxycodone as needed  - Orthopedic surgery consulted.   Appreciate input  -S/P total hip arthroplasty 5/20.   - POD#3  - DVT prophylaxis per ortho, aspirin twice daily  - Patient lives alone.    - Plan for home care on discharge     Syncopal episode  - Patient had episode of syncope while doing physical therapy  - Etiology is probably vasovagal  - Unremarkable telemetry monitoring  - Carotid ultrasound shows bilateral 50 to 69% stenosis  -Echo shows EF of 60 to 85% with no wall motion abnormalities.     Acute blood loss anemia  - Hgb drop from 15 to 10 noted. EBL 300ml noted.   - No signs or symptoms of GI bleeding. No need for transfusion at this point  - Continue to monitor hemoglobin     Elevated blood pressure without the diagnosis of hypertension:   - Patient had elevated blood pressure up to 192/114 in ER due to anxiety and/or pain.    - She does not have history of hypertension.  - Well controlled today  - Hydralazine as needed.     Hypothyroidism:   - Continue PTA levothyroxine     History of rheumatoid arthritis:   - Patient reports that she takes ibuprofen as needed joint pain  - Continue scheduled Tylenol 3 times daily    Constipation  - No bowel movement for the past few days  - Increase bowel regimen  - Start Dulcolax operatory     40 MINUTES SPENT BY ME on the date of service doing chart review, history, exam, documentation & further activities per the note    Principal Problem:    Hip fracture, left, closed, initial encounter  (H)      VTE prophylaxis: Aspirin twice daily as per Ortho  DIET: Orders Placed This Encounter      Advance Diet as Tolerated: Regular Diet Adult      Discharge Instruction - Regular Diet Adult      Disposition/Barriers to discharge: Postsurgical care, constipation, anticipate tomorrow  Code Status: Full Code    Subjective:  Lesly is feeling better today, no acute significant events overnight.  No bowel movement yet.    PHYSICAL EXAM  Vitals:    05/19/23 1746   Weight: 63 kg (138 lb 14.2 oz)     B/P:140/67 T:98.5 P:75 R:17     Intake/Output Summary (Last 24 hours) at 5/23/2023 1359  Last data filed at 5/23/2023 1300  Gross per 24 hour   Intake 600 ml   Output --   Net 600 ml      Body mass index is 23.84 kg/m .    Constitutional: awake, alert, cooperative, no apparent distress, and appears stated age  Respiratory: No increased work of breathing, good air exchange, clear to auscultation bilaterally, no crackles or wheezing  Cardiovascular: Normal apical impulse, regular rate and rhythm, normal S1 and S2, no S3 or S4, and no murmur noted  GI: No scars, normal bowel sounds, soft, non-distended, non-tender, no masses palpated, no hepatosplenomegally  Skin: no bruising or bleeding and normal skin color, texture, turgor  Musculoskeletal: There is no redness, warmth, or swelling of the joints.  Full range of motion noted.  intact surgical incision on left hip covered with dressing.  Neurologic: Awake, alert, oriented to name, place and time.  Cranial nerves II-XII are grossly intact.  Motor is 5 out of 5 bilaterally.   Sensory is intact.    Neuropsychiatric: Appropriate with examiner      PERTINENT LABS/IMAGING:    I have personally reviewed the following data over the past 24 hrs:    6.6  \   9.4 (L)   / 158     140 106 17.5 /  100 (H)   3.9 25 0.76 \       Imaging results reviewed over the past 24 hrs:   Recent Results (from the past 24 hour(s))   US Carotid Bilateral    Narrative    EXAM: US CAROTID BILATERAL  LOCATION:  [facility]  DATE/TIME: 5/22/2023 4:56 PM CDT    INDICATION: Syncope  COMPARISON: None.  TECHNIQUE: Duplex exam performed utilizing 2D gray-scale imaging, Doppler interrogation with color-flow and spectral waveform analysis. The percent diameter stenosis is determined using NASCET criteria and Society of Radiologists in Ultrasound Consensus   Criteria.    FINDINGS: Tortuous vessels which can decrease accuracy of velocity measurement.    RIGHT: Moderate plaque at the bifurcation. The peak systolic velocity in the ICA is 125-230 cm/sec, consistent with 50-69% stenosis. Normal velocities in the ECA. Antegrade flow within the vertebral artery.     LEFT: Moderate plaque at the bifurcation. The peak systolic velocity in the ICA is 125-230 cm/sec, consistent with 50-69% stenosis. Normal velocities in the ECA. Antegrade flow within the vertebral artery.    VELOCITY CHART:  CCA   Right: 180/24 cm/s   Left: 153/33 cm/s  ICA   Right: 177/42 cm/s   Left: 141/46 cm/s  ECA   Right: 152 cm/s   Left: 156 cm/s  ICA/CCA PSV Ratio   Right: 1.5   Left: 0.9      Impression    IMPRESSION:  1.  Torturous carotid vasculature which can decrease the accuracy of velocity measurement.     2.  Moderate plaque formation, velocities consistent with 50-69% stenosis in the right internal carotid artery.    3.  Moderate plaque formation, velocities consistent with 50-69% stenosis in the left internal carotid artery.    4.  Flow within the vertebral arteries is antegrade.       Discussed with patient, family, orthopedics, nursing staff and discharge planner    Adam Kumar MD  [facility] Medicine Service  [phone]

## 2023-05-23 NOTE — PLAN OF CARE
Problem: Plan of Care - These are the overarching goals to be used throughout the patient stay.    Goal: Absence of Hospital-Acquired Illness or Injury  Outcome: Progressing  Intervention: Identify and Manage Fall Risk  Recent Flowsheet Documentation  Taken 5/22/2023 1836 by Krunal Mccurdy RN  Safety Promotion/Fall Prevention:   activity supervised   assistive device/personal items within reach   clutter free environment maintained   lighting adjusted   mobility aid in reach   nonskid shoes/slippers when out of bed   patient and family education   safety round/check completed   supervised activity     Problem: Plan of Care - These are the overarching goals to be used throughout the patient stay.    Goal: Optimal Comfort and Wellbeing  Outcome: Progressing  Intervention: Monitor Pain and Promote Comfort  Recent Flowsheet Documentation  Taken 5/22/2023 1621 by Krunal Mccurdy RN  Pain Management Interventions: medication (see MAR)     Problem: Risk for Delirium  Goal: Optimal Coping  Outcome: Progressing  Goal: Improved Behavioral Control  Outcome: Progressing  Goal: Improved Attention and Thought Clarity  Outcome: Progressing  Goal: Improved Sleep  Outcome: Progressing     Problem: Hip Fracture Medical Management  Goal: Absence of Bleeding  Outcome: Progressing  Goal: Effective Bowel Elimination  Outcome: Progressing  Goal: Baseline Cognitive Function Maintained  Outcome: Progressing  Goal: Optimal Pain Control and Function  Outcome: Progressing  Intervention: Manage Acute Orthopaedic-Related Pain  Recent Flowsheet Documentation  Taken 5/22/2023 1621 by Krunal Mccurdy RN  Pain Management Interventions: medication (see MAR)  Goal: Effective Urinary Elimination  Outcome: Progressing     Pt alert and oriented. Pt c/o left hip pain that alleviates w/ rest and increases w/ activity. Dressing was clean, dry, and intact. Scheduled acetaminophen and PRN ibuprofen given this shift. Pt is a stand by assist w/ walker for  transfers. Tele showing NSR w/ HR of 81. US of carotid showed 50-69% stenosis bilaterally. Denied dizziness or lightheadedness this shift. Pt given PRN bisacodyl 5 mg tablet along w/ scheduled Senna S tablet at HS for constipation.

## 2023-05-23 NOTE — PLAN OF CARE
"  Problem: Plan of Care - These are the overarching goals to be used throughout the patient stay.    Goal: Plan of Care Review  Description: The Plan of Care Review/Shift note should be completed every shift.  The Outcome Evaluation is a brief statement about your assessment that the patient is improving, declining, or no change.  This information will be displayed automatically on your shift note.  Outcome: Progressing     Problem: Plan of Care - These are the overarching goals to be used throughout the patient stay.    Goal: Patient-Specific Goal (Individualized)  Description: You can add care plan individualizations to a care plan. Examples of Individualization might be:  \"Parent requests to be called daily at 9am for status\", \"I have a hard time hearing out of my right ear\", or \"Do not touch me to wake me up as it startles me\".  Outcome: Progressing     Problem: Plan of Care - These are the overarching goals to be used throughout the patient stay.    Goal: Absence of Hospital-Acquired Illness or Injury  Outcome: Progressing   Goal Outcome Evaluation:  Patient received PRN Ibuprofen this shift for left hip pain which was effective, assist of 1 ambulation to bathroom, suppository given for constipation. Telemetry NSR.                      "

## 2023-05-24 VITALS
OXYGEN SATURATION: 97 % | RESPIRATION RATE: 18 BRPM | TEMPERATURE: 98.2 F | SYSTOLIC BLOOD PRESSURE: 112 MMHG | WEIGHT: 138.89 LBS | HEART RATE: 77 BPM | BODY MASS INDEX: 23.84 KG/M2 | DIASTOLIC BLOOD PRESSURE: 76 MMHG

## 2023-05-24 PROCEDURE — 250N000013 HC RX MED GY IP 250 OP 250 PS 637: Performed by: INTERNAL MEDICINE

## 2023-05-24 PROCEDURE — 99238 HOSP IP/OBS DSCHRG MGMT 30/<: CPT | Performed by: INTERNAL MEDICINE

## 2023-05-24 PROCEDURE — 250N000013 HC RX MED GY IP 250 OP 250 PS 637: Performed by: ORTHOPAEDIC SURGERY

## 2023-05-24 RX ADMIN — CEPHALEXIN 500 MG: 500 CAPSULE ORAL at 05:37

## 2023-05-24 RX ADMIN — IBUPROFEN 600 MG: 600 TABLET, FILM COATED ORAL at 00:21

## 2023-05-24 RX ADMIN — LEVOTHYROXINE SODIUM 50 MCG: 0.03 TABLET ORAL at 06:07

## 2023-05-24 RX ADMIN — ASPIRIN 81 MG: 81 TABLET, COATED ORAL at 08:15

## 2023-05-24 ASSESSMENT — ACTIVITIES OF DAILY LIVING (ADL)
ADLS_ACUITY_SCORE: 37

## 2023-05-24 NOTE — DISCHARGE SUMMARY
[facility]  Hospitalist Discharge Summary      Date of Admission:  5/19/2023  Date of Discharge:  5/24/2023  Discharging Provider: Kimberly Mcconnell MD  Discharge Service: Hospitalist Service    Discharge Diagnoses   Principal Problem:    Hip fracture, left, closed, initial encounter (H)        Clinically Significant Risk Factors          Follow-ups Needed After Discharge   Follow-up Appointments     Follow Up Care      Please follow-up with Dr. Ohara/Carrie Avendaño PA-C in 2 weeks at [facility]. Call our scheduling line at [phone] to make an   appointment if you do not already have one scheduled. Call [phone] to   reach Dr. Ohara' team with questions or concerns.         Follow-up and recommended labs and tests       Follow up with primary care provider, Lilliam Jones, within 7 days   to evaluate medication change, to evaluate after surgery, and for hospital   follow- up.             Unresulted Labs Ordered in the Past 30 Days of this Admission     No orders found from 4/19/2023 to 5/20/2023.          Discharge Disposition   Admited to home care: PT/OT  Discharged to home  Condition at discharge: Stable    Hospital Course   73 year old female with a past medical history of hypothyroidism and rheumatoid arthritis who presented to ER for left hip pain after a fall.  Found to have hip fracture     Acute left hip fracture:   - CT hip with subcapital fracture left femoral neck with minimal impaction but no significant angulation deformity.  -S/P total hip arthroplasty 5/20.   - DVT prophylaxis per ortho, aspirin twice daily  - pain controlled      Syncopal episode  - Patient had episode of syncope while doing physical therapy  - Etiology is probably vasovagal  - Unremarkable telemetry monitoring  - Carotid ultrasound shows bilateral 50 to 69% stenosis  -Echo shows EF of 60 to 85% with no wall motion abnormalities.     Acute blood loss anemia  - Hgb drop from 15 to 10 noted.  "EBL 300ml noted.   - No signs or symptoms of GI bleeding. No need for transfusion at this point  - Continue to monitor hemoglobin at PCP follow up      Elevated blood pressure without the diagnosis of hypertension:   - Patient had elevated blood pressure up to 192/114 in ER due to anxiety and/or pain.    - She does not have history of hypertension.  - Well controlled today     Hypothyroidism:   - Continue PTA levothyroxine     History of rheumatoid arthritis:   - Patient reports that she takes ibuprofen as needed joint pain  - Continue scheduled Tylenol 3 times daily     Constipation  - bowel regiment     Consultations This Hospital Stay   ORTHOPEDIC SURGERY IP CONSULT  CARE MANAGEMENT / SOCIAL WORK IP CONSULT  ORTHOPEDIC SURGERY IP CONSULT  PHYSICAL THERAPY ADULT IP CONSULT  OCCUPATIONAL THERAPY ADULT IP CONSULT    Code Status   Prior    Time Spent on this Encounter   I, Kimberly Mcconnell MD, personally saw the patient today and spent less than or equal to 30 minutes discharging this patient.       Kimberly Mcconnell MD  [address] [address]  Phone: [phone]  Fax: [phone]  ______________________________________________________________________    Physical Exam   Vital Signs:                    Weight: 138 lbs 14.24 oz         Primary Care Physician   Lilliam Jones    Discharge Orders      Home Care Referral      Primary Care - Care Coordination Referral      Reason for your hospital stay    Hip replacement     When to call - Contact Surgeon Team    You may experience symptoms that require follow-up before your scheduled appointment. Refer to the \"Stoplight Tool\" for instructions on when to contact your Surgeon Team if you are concerned about pain control, blood clots, constipation, or if you are unable to urinate.     When to call - Reach out to Urgent Care    If you are not able to reach your Surgeon Team and you need immediate care, go " to the Orthopedic Walk-in Clinic or Urgent Care at your Surgeon's office.  Do NOT go to the Emergency Room unless you have shortness of breath, chest pain, or other signs of a medical emergency.     When to call - Reasons to Call 911    Call 911 immediately if you experience sudden-onset chest pain, arm weakness/numbness, slurred speech, or shortness of breath     Discharge Instruction - Breathing exercises    Perform breathing exercises using your Incentive Spirometer 10 times per hour while awake for 2 weeks.     Symptoms - Fever Management    A low grade fever can be expected after surgery.  Use acetaminophen (TYLENOL) as needed for fever management.  Contact your Surgeon Team if you have a fever greater than 101.5 F, chills, and/or night sweats.     Symptoms - Constipation management    Constipation (hard, dry bowel movements) is expected after surgery due to the combination of being less active, the anesthetic, and the opioid pain medication.  You can do the following to help reduce constipation:  ~  FLUIDS:  Drink clear liquids (water or Gatorade), or juice (apple/prune).  ~  DIET:  Eat a fiber rich diet.    ~  ACTIVITY:  Get up and move around several times a day.  Increase your activity as you are able.  MEDICATIONS:  Reduce the risk of constipation by starting medications before you are constipated.  You can take Miralax   (1 packet as directed) and/or a stool softener (Senokot 1-2 tablets 1-2 times a day).  If you already have constipation and these medications are not working, you can get magnesium citrate and use as directed.  If you continue to have constipation you can try an over the counter suppository or enema.  Call your Surgeon Team if it has been greater than 3 days since your last bowel movement.     Symptoms - Reduced Urine Output    Changes in the amount of fluids you drank before and after surgery may result in problems urinating.  It is important to stay well-hydrated after surgery and drink  plenty of water. If it has been greater than 8 hours since you have urinated despite drinking plenty of water, call your Surgeon Team.     Medication instructions -  Anticoagulation - aspirin    Take the aspirin as prescribed for a total of four weeks after surgery.  This is given to help minimize your risk of blood clot.     Activity - Exercises to prevent blood clots    Unless otherwise directed by your Surgeon team, perform the following exercises at least three times per day for the first four weeks after surgery to prevent blood clots in your legs: 1) Point and flex your feet (Ankle Pumps), 2) Move your ankle around in big circles, 3) Wiggle your toes, 4) Walk, even for short distances, several times a day, will help decrease the risk of blood clots.     Comfort and Pain Management - Pain after Surgery    Pain after surgery is normal and expected.  You will have some amount of pain for several weeks after surgery.  Your pain will improve with time.  There are several things you can do to help reduce your pain including: rest, ice, elevation, and using pain medications as needed. Contact your Surgeon Team if you have pain that persists or worsens after surgery despite rest, ice, elevation, and taking your medication(s) as prescribed. Contact your Surgeon Team if you have new numbness, tingling, or weakness in your operative extremity.     Comfort and Pain Management - Swelling after Surgery    Swelling and/or bruising of the surgical extremity is common and may persist for several months after surgery. In addition to frequent icing and elevation, gentle compressive support with an ACE wrap or tubigrip may help with swelling. Apply compression regularly, removing at least twice daily to perform skin checks. Contact your Surgeon Team if your swelling increases and is NOT associated with an increase in your activity level, or if your swelling increases and is associated with redness and pain.     Comfort and Pain  Management - Cold therapy    Ice can be used to control swelling and discomfort after surgery. Place a thin towel over your operative site and apply the ice pack overtop. Leave ice pack in place for 20 minutes, then remove for 20 minutes. Repeat this 20 minutes on/20 minutes off routine as often as tolerated.     Medication Instructions - Acetaminophen (TYLENOL) Instructions    You were discharged with acetaminophen (TYLENOL) for pain management after surgery. Acetaminophen most effectively manages pain symptoms when it is taken on a schedule without missing doses (every four, six, or eight hours). Your Provider will prescribe a safe daily dose between 3000 - 4000 mg.  Do NOT exceed this daily dose. Most patients use acetaminophen for pain control for the first four weeks after surgery.  You can wean from this medication as your pain decreases.     Medication Instructions - Opioids - Tapering Instructions    In the first three days following surgery, your symptoms may warrant use of the narcotic pain medication every four to six hours as prescribed. This is normal. As your pain symptoms improve, focus your efforts on decreasing (tapering) use of narcotic medications. The most successful tapering strategy is to first, decrease the number of tablets you take every 4-6 hours to the minimum prescribed. Then, increase the amount of time between doses.  For example:  First, taper to   or 1 tablet every 4-6 hours.  Then, taper to   or 1 tablet every 6-8 hours.  Then, taper to   or 1 tablet every 8-10 hours.  Then, taper to   or 1 tablet every 10-12 hours.  Then, taper to   or 1 tablet at bedtime.  The bedtime dose can help with comfort during sleep and is typically the last dose to be discontinued after surgery.     Follow Up Care    Please follow-up with Dr. Ohara/Carrie Avendaño PA-C in 2 weeks at [facility]. Call our scheduling line at [phone] to make an appointment if you do not already have one scheduled. Call  "[phone] to reach Dr. Ohara' team with questions or concerns.     Activity - Total Hip Arthroplasty    Refer to the [facility] \"Your Guide to Total Joint Replacement\" for recommendations on activities and Exercises.     Return to Driving    Return to driving - Driving is NOT permitted until directed by your provider. Under no circumstance are you permitted to drive while using narcotic pain medications.     No flexion past 90 degrees    No bending at waist past 90 degrees.     No internal rotation    No pivoting on your operative leg.     No adduction past midline    No crossing your operative leg.     Weight bearing as tolerated    Weight bearing as tolerated on your operative extremity.     Dressing / Wound Care - Wound    Leave surgical dressing clean, dry and in place until post-op visit with surgical team. Do not get dressing wet. Do not soak or saturate dressing or incision site. Cover dressing/incision site with a waterproof dressing for showering or bathing.     Dressing / Wound care - Shower with wound/dressing covered    You must COVER your dressing or incision with saran wrap (or any other non-permeable covering) to allow the incision to remain dry while showering.  You may shower anytime after surgery as long as the surgical wound stays dry. Continue to cover your dressing or incision for showering until your first office visit.  You are strictly prohibited from soaking or submerging the surgical wound underwater.     Dressing / Wound Care - NO Tub Bathing    Tub bathing, swimming, or any other activities that will cause your incision to be submerged in water should be avoided until allowed by your Surgeon.     Reason for your hospital stay    Principal Problem:    Hip fracture, left, closed, initial encounter (H)     Follow-up and recommended labs and tests     Follow up with primary care provider, Lilliam Jones, within 7 days to evaluate medication change, to evaluate after surgery, and " for hospital follow- up.     Activity    Your activity upon discharge: activity as tolerated     Crutches DME    DME Documentation: Describe the reason for need to support medical necessity: Impaired gait status post hip surgery. I, the undersigned, certify that the above prescribed supplies are medically necessary for this patient and is both reasonable and necessary in reference to accepted standards of medical practice in the treatment of this patient's condition and is not prescribed as a convenience.     Cane DME    DME Documentation: Describe the reason for need to support medical necessity: Impaired gait status post hip surgery. I, the undersigned, certify that the above prescribed supplies are medically necessary for this patient and is both reasonable and necessary in reference to accepted standards of medical practice in the treatment of this patient's condition and is not prescribed as a convenience.     Walker DME    : DME Documentation: Describe the reason for need to support medical necessity: Impaired gait status post hip surgery. I, the undersigned, certify that the above prescribed supplies are medically necessary for this patient and is both reasonable and necessary in reference to accepted standards of medical practice in the treatment of this patient's condition and is not prescribed as a convenience.     Walker Order for DME - ONLY FOR DME    I, the undersigned, certify that the above prescribed supplies are medically necessary for this patient and is both reasonable and necessary in reference to accepted standards of medical and necessary in reference to accepted standards of medical practice in the treatment of this patient's condition and is not prescribed as a convenience.      Discharge Instruction - Regular Diet Adult    Return to your pre-surgery diet unless instructed otherwise     Diet    Follow this diet upon discharge: Orders Placed This Encounter      Advance Diet as Tolerated:  Regular Diet Adult      Discharge Instruction - Regular Diet Adult       Significant Results and Procedures   Most Recent 3 CBC's:  Recent Labs   Lab Test 05/23/23  0500 05/22/23  0502 05/21/23  1503 05/21/23  0500 05/19/23  1808   WBC 6.6 8.3  --   --  17.7*   HGB 9.4* 9.9* 10.9*   < > 15.4   MCV 92 92  --   --  93    146*  --   --  246    < > = values in this interval not displayed.     Most Recent 3 BMP's:  Recent Labs   Lab Test 05/23/23  0500 05/22/23  0549 05/22/23  0502 05/21/23  1423 05/19/23  1808 01/27/23  1341     --   --   --  138 141   POTASSIUM 3.9  --   --   --  3.6 5.0   CHLORIDE 106  --   --   --  99 103   CO2 25  --   --   --  24 24   BUN 17.5  --   --   --  24.3* 20.8   CR 0.76  --  0.88  --  0.84 0.84   ANIONGAP 9  --   --   --  15 14   ELTON 8.7*  --   --   --  9.9 10.0   * 97  --  120* 135* 122*     Most Recent 2 LFT's:  Recent Labs   Lab Test 05/19/23 1808 01/27/23  1341   AST 28 25   ALT 25 18   ALKPHOS 76 65   BILITOTAL 0.5 0.5   ,   Results for orders placed or performed during the hospital encounter of 05/19/23   CT Hip Left w/o Contrast    Narrative    EXAM: CT HIP LEFT W/O CONTRAST  LOCATION: [facility]  DATE/TIME: 5/19/2023 6:21 PM CDT    INDICATION: Left femoral neck fracture. Surgical planning purposes.  COMPARISON: None.  TECHNIQUE: Noncontrast. Axial, sagittal and coronal thin-section reconstruction. Dose reduction techniques were used.     FINDINGS:     BONES:  -Subcapital fracture left femoral neck. Minimal impaction. No evidence for intertrochanteric extension. Degenerative change at the left hip joint itself. No dislocation.    Degenerative change at the left SI joint. No additional fractures are identified within the visualized portion of the left hemipelvis.    SOFT TISSUES:  -Fatty infiltration and atrophy of the left gluteus minimus and medius. No evidence for significant effusion. No evidence for significant hematoma or fluid  "collection about the fracture.      Impression    IMPRESSION:  1.  Subcapital fracture left femoral neck with minimal impaction but no significant angulation deformity. No evidence for intertrochanteric extension.  2.  Degenerative change left hip joint.  3.  Visualized left hemipelvis negative for fracture.  4.  Degenerative change at the left SI joint.     POC US Guidance Needle Placement    Narrative    Ultrasound was performed as guidance to an anesthesia procedure.  Click   \"PACS images\" hyperlink below to view any stored images.  For specific   procedure details, view procedure note authored by anesthesia.   POC US Guidance Needle Placement    Narrative    Ultrasound was performed as guidance to an anesthesia procedure.  Click   \"PACS images\" hyperlink below to view any stored images.  For specific   procedure details, view procedure note authored by anesthesia.   POC US Guidance Needle Placement    Narrative    Ultrasound was performed as guidance to an anesthesia procedure.  Click   \"PACS images\" hyperlink below to view any stored images.  For specific   procedure details, view procedure note authored by anesthesia.   XR Pelvis w Hip Port Left  1 View    Narrative    EXAM: XR PELVIS AND HIP PORTABLE LEFT 1 VIEW  LOCATION: [facility]  DATE/TIME: 5/20/2023 1:37 PM CDT    INDICATION: Status post hip surgery.  COMPARISON: 5/19/2023      Impression    IMPRESSION: Postoperative changes related to interval placement of a left total hip arthroplasty. The components project in the expected position without acute displaced periprosthetic fracture. Expected postop soft tissue gas left hip. Soft tissue   swelling. Right hip joint space is unchanged. No displaced pelvic fracture identified. Degenerative change at the symphysis pubis and in the lower lumbar spine.   US Carotid Bilateral    Narrative    EXAM: US CAROTID BILATERAL  LOCATION: [facility]  DATE/TIME: 5/22/2023 " 4:56 PM CDT    INDICATION: Syncope  COMPARISON: None.  TECHNIQUE: Duplex exam performed utilizing 2D gray-scale imaging, Doppler interrogation with color-flow and spectral waveform analysis. The percent diameter stenosis is determined using NASCET criteria and Society of Radiologists in Ultrasound Consensus   Criteria.    FINDINGS: Tortuous vessels which can decrease accuracy of velocity measurement.    RIGHT: Moderate plaque at the bifurcation. The peak systolic velocity in the ICA is 125-230 cm/sec, consistent with 50-69% stenosis. Normal velocities in the ECA. Antegrade flow within the vertebral artery.     LEFT: Moderate plaque at the bifurcation. The peak systolic velocity in the ICA is 125-230 cm/sec, consistent with 50-69% stenosis. Normal velocities in the ECA. Antegrade flow within the vertebral artery.    VELOCITY CHART:  CCA   Right: 180/24 cm/s   Left: 153/33 cm/s  ICA   Right: 177/42 cm/s   Left: 141/46 cm/s  ECA   Right: 152 cm/s   Left: 156 cm/s  ICA/CCA PSV Ratio   Right: 1.5   Left: 0.9      Impression    IMPRESSION:  1.  Torturous carotid vasculature which can decrease the accuracy of velocity measurement.     2.  Moderate plaque formation, velocities consistent with 50-69% stenosis in the right internal carotid artery.    3.  Moderate plaque formation, velocities consistent with 50-69% stenosis in the left internal carotid artery.    4.  Flow within the vertebral arteries is antegrade.   Echocardiogram Complete     Value    LVEF  60-65%    [facility]    [id number]  [id number]  [id number]  [id number]^PAULY^[place]ISHI^[place] [address]     Name: MIGUEL BABIN  MRN: [id number]  : 1950  Study Date: 2023 10:40 AM  Age: 73 yrs  Gender: Female  Patient Location: [facility]  Reason For Study: Syncope  Ordering Physician: NISHI COATS  Performed By: NAVEEN     BSA: 1.7 m2  Height: 64 in  Weight: 138 lb  HR: 77  BP: 114/57  mmHg  ______________________________________________________________________________  Procedure  Complete Portable Echo Adult.  ______________________________________________________________________________  Interpretation Summary     Technically difficult. Patient needed to be supine post hip surgery.  Normal left ventricular size.. Normal left ventricular systolic function. Left  ventricular ejection fraction is estimated at 60 to 65%. No regional wall  motion abnormality noted.  Normal right ventricular size and systolic function.  Mild mitral regurgitation.  Mild tricuspid regurgitation. Estimate of RV systolic pressure is normal at 23  mmHg plus right atrial pressure  ______________________________________________________________________________  I      WMSI = 1.00     % Normal = 100     X - Cannot   0 -                      (2) - Mildly 2 -          Segments  Size  Interpret    Hyperkinetic 1 - Normal  Hypokinetic  Hypokinetic  1-2     small                                                     7 -          3-5      moderate  3 - Akinetic 4 -          5 -         6 - Akinetic Dyskinetic   6-14    large               Dyskinetic   Aneurysmal  w/scar       w/scar       15-16   diffuse     Left Ventricle  The left ventricle is normal in size. There is normal left ventricular wall  thickness. Left ventricular systolic function is normal. The visual ejection  fraction is 60-65%. Diastolic Doppler findings (E/E' ratio and/or other  parameters) suggest left ventricular filling pressures are normal. No regional  wall motion abnormalities noted.     Right Ventricle  Normal right ventricle size and systolic function. TAPSE is normal, which is  consistent with normal right ventricular systolic function.     Atria  Normal left atrial size. Right atrium not well visualized.     Mitral Valve  Mitral valve leaflets appear normal. There is mild (1+) mitral regurgitation.     Tricuspid Valve  The tricuspid valve is not well  visualized, but is grossly normal. The right  ventricular systolic pressure is approximated at 22.8 mmHg plus the right  atrial pressure. There is mild (1+) tricuspid regurgitation.     Aortic Valve  The aortic valve is not well visualized. No hemodynamically significant  valvular aortic stenosis.     Pulmonic Valve  The pulmonic valve is not well visualized.     Vessels  The aorta root is normal. Normal size ascending aorta. Inferior vena cava not  well visualized for estimation of right atrial pressure.     Pericardium  There is no pericardial effusion.     ______________________________________________________________________________  MMode/2D Measurements & Calculations  RVDd: 3.1 cm  IVSd: 0.94 cm  LVIDd: 4.6 cm  LVIDs: 2.7 cm  LVPWd: 1.1 cm  FS: 40.6 %  LV mass(C)d: 162.8 grams  LV mass(C)dI: 97.4 grams/m2  Ao root diam: 3.3 cm  LA dimension: 2.9 cm  asc Aorta Diam: 2.8 cm     LA/Ao: 0.87  LVOT diam: 1.9 cm  LVOT area: 2.9 cm2  LA Volume Indexed (AL/bp): 14.6 ml/m2  RWT: 0.48  TAPSE: 2.2 cm     Time Measurements  MM HR: 73.0 BPM     Doppler Measurements & Calculations  MV E max adam: 69.5 cm/sec  MV A max adam: 88.6 cm/sec  MV E/A: 0.78     MV dec time: 0.24 sec  LV V1 max P.3 mmHg  LV V1 max: 125.3 cm/sec  LV V1 VTI: 24.7 cm  SV(LVOT): 71.2 ml  SI(LVOT): 42.6 ml/m2  PA acc time: 0.13 sec  TR max adam: 238.9 cm/sec  TR max P.8 mmHg  E/E' av.3  Lateral E/e': 7.9  Medial E/e': 8.8  RV S Adam: 19.7 cm/sec     ______________________________________________________________________________  Report approved by: Мария Dozier 2023 12:31 PM               Discharge Medications   Discharge Medication List as of 2023 10:09 AM      START taking these medications    Details   acetaminophen (TYLENOL) 325 MG tablet Take 2 tablets (650 mg) by mouth every 4 hours as needed for other (For optimal non-opioid multimodal pain management to improve pain control.), Disp-100 tablet, R-0, E-Prescribe       aspirin (ASA) 81 MG EC tablet Take 1 tablet (81 mg) by mouth 2 times daily, Disp-60 tablet, R-0, E-Prescribe      cefadroxil (DURICEF) 500 MG capsule Take 1 capsule (500 mg) by mouth 2 times daily, Disp-28 capsule, R-0, E-Prescribe      hydrOXYzine (ATARAX) 10 MG tablet Take 1 tablet (10 mg) by mouth every 6 hours as needed for other (adjuvant pain), Disp-60 tablet, R-0, E-Prescribe      senna-docusate (SENOKOT-S/PERICOLACE) 8.6-50 MG tablet Take 1 tablet by mouth 2 times daily, Disp-60 tablet, R-0, E-Prescribe         CONTINUE these medications which have CHANGED    Details   oxyCODONE (ROXICODONE) 5 MG tablet Take 0.5-1 tablets (2.5-5 mg) by mouth every 4 hours as needed for moderate pain or severe pain, Disp-26 tablet, R-0, E-Prescribe         CONTINUE these medications which have NOT CHANGED    Details   albuterol (PROAIR HFA/PROVENTIL HFA/VENTOLIN HFA) 108 (90 Base) MCG/ACT inhaler Inhale 2 puffs into the lungs every 6 hours as needed for shortness of breath, Disp-18 g, R-0, E-PrescribePharmacy may dispense brand covered by insurance (Proair, or proventil or ventolin or generic albuterol inhaler). Please provide teaching on inhale r use when pt picks up      calcium-vitamin D (CALCIUM-VITAMIN D) 500 mg(1,250mg) -200 unit per tablet [CALCIUM-VITAMIN D (CALCIUM-VITAMIN D) 500 MG(1,250MG) -200 UNIT PER TABLET] Take 1 tablet by mouth., Historical      gabapentin (NEURONTIN) 600 MG tablet TAKE 1 TABLET AT BEDTIME (NEED TO BE SEEN FOR ANNUAL PHYSICAL/LAB BEFORE FURTHER REFILL, PLEASE MAKE APPOINTMENT), Disp-90 tablet, R-3, E-Prescribe      ibuprofen (ADVIL,MOTRIN) 600 MG tablet [IBUPROFEN (ADVIL,MOTRIN) 600 MG TABLET] Take 600 mg by mouth every 6 (six) hours as needed for pain., Historical      levothyroxine (SYNTHROID/LEVOTHROID) 50 MCG tablet TAKE 1 TABLET DAILY, Disp-90 tablet, R-3, E-Prescribe           Allergies   Allergies   Allergen Reactions     Sulfa (Sulfonamide Antibiotics) [Sulfa Antibiotics] Unknown

## 2023-05-24 NOTE — PLAN OF CARE
Physical Therapy Discharge Summary    Reason for therapy discharge:    Discharged to home with home therapy.    Progress towards therapy goal(s). See goals on Care Plan in [facility] electronic health record for goal details.  Goals partially met.  Barriers to achieving goals:   discharge from facility.    Therapy recommendation(s):    Continued therapy is recommended.  Rationale/Recommendations:  Continue PT with home PT.

## 2023-05-24 NOTE — PLAN OF CARE
Problem: Plan of Care - These are the overarching goals to be used throughout the patient stay.    Goal: Plan of Care Review  Description: The Plan of Care Review/Shift note should be completed every shift.  The Outcome Evaluation is a brief statement about your assessment that the patient is improving, declining, or no change.  This information will be displayed automatically on your shift note.  Outcome: Progressing     Problem: Plan of Care - These are the overarching goals to be used throughout the patient stay.    Goal: Optimal Comfort and Wellbeing  Outcome: Progressing  Intervention: Monitor Pain and Promote Comfort  Recent Flowsheet Documentation  Taken 5/24/2023 0021 by Ami Chapman, RN  Pain Management Interventions: medication (see MAR)     Problem: Hip Fracture Medical Management  Goal: Optimal Pain Control and Function  Outcome: Progressing  Intervention: Manage Acute Orthopaedic-Related Pain  Recent Flowsheet Documentation  Taken 5/24/2023 0021 by Ami Chapman, RN  Pain Management Interventions: medication (see MAR)   Goal Outcome Evaluation:  Pt is pleasant and comfortable. VS WNL.  Ambulating to bathroom using a FFW with minimal pain. Pt requested Ibuprofen 60 mg PRN given. Effective, no c/o diarrhea this shift.

## 2023-05-24 NOTE — PROGRESS NOTES
"Care Management Follow Up    Length of Stay (days): 5    Expected Discharge Date: 05/24/2023     Concerns to be Addressed:   Discharge planning     Patient plan of care discussed at interdisciplinary rounds: Yes    Anticipated Discharge Disposition:  Home with home care     Anticipated Discharge Services:  Home with home care     Education Provided on the Discharge Plan:  Per Care Team   Patient/Family in Agreement with the Plan:  Yes    Referrals Placed by CM/SW:    Private pay costs discussed: Not applicable    Additional Information:  Chart reviewed.    Social Hx:   \"Patient is an active 74 yo female who normally works and lives independently. She fell and sustained a hip fracture which was repaired yesterday. She is WBAT.\"      CM updates:    [facility] can take pt on for home PT/OT.       CM will continue to follow plan of care, review recommendations, and assist with any discharge needs anticipated.        Luana Zimmerman RN     Care Management Discharge Note    Discharge Date: 05/24/2023       Discharge Disposition: Home Care    Discharge Services:  Home Care     Discharge Transportation: family or friend will provide    Private pay costs discussed: Not applicable    Does the patient's insurance plan have a 3 day qualifying hospital stay waiver?  Yes   Will the waiver be used for post-acute placement? No  Education Provided on the Discharge Plan:  Per Care Team   Persons Notified of Discharge Plans: Yes  Patient/Family in Agreement with the Plan:      Handoff Referral Completed: Yes    Additional Information:  Final discharge plan is for patient to go pts brother's house for awhile, with [facility] Following for home PT/OT. Writer gave [facility] the pts brothers address. Family to transport.     Provider, bedside, and pt notified.         Luana Zimmerman RN              "

## 2023-05-24 NOTE — PLAN OF CARE
Problem: Plan of Care - These are the overarching goals to be used throughout the patient stay.    Goal: Plan of Care Review  Description: The Plan of Care Review/Shift note should be completed every shift.  The Outcome Evaluation is a brief statement about your assessment that the patient is improving, declining, or no change.  This information will be displayed automatically on your shift note.  Outcome: Met     Problem: Plan of Care - These are the overarching goals to be used throughout the patient stay.    Goal: Absence of Hospital-Acquired Illness or Injury  Outcome: Met     Problem: Plan of Care - These are the overarching goals to be used throughout the patient stay.    Goal: Readiness for Transition of Care  Outcome: Met   Goal Outcome Evaluation:       No verbal or nonverbal expressions of pain, SBA with walker, VS WDL. [facility] will discharge home with belongings. Paperwork explained to patient, daughter will transport patient.

## 2023-05-24 NOTE — PROGRESS NOTES
Orthopaedic Surgery Progress Note   5/24/2023    Subjective: Lesly's doing great today. Had some diarrhea last night, but minimal pain. No new concerns today. Going to her brother's for a few days before going home. Denies n/v, fevers, chills, SOB.     Objective: /76 (BP Location: Right arm)   Pulse 77   Temp 98.2  F (36.8  C) (Oral)   Resp 18   Wt 63 kg (138 lb 14.2 oz)   SpO2 97%   BMI 23.84 kg/m      General: NAD, alert and oriented, cooperative with exam.   Cardio: RRR, extremities wwp.   Respiratory: Non-labored breathing.  MSK: LLE: Toes wwp, DP 2+, bcr in all toes. Compartments soft at the thigh. +EHL/FHL/GSC/TA. SILT SP/DP/Sa/Butler/T. Dressing c/d/i.     Labs:  Lab Results   Component Value Date    WBC 6.6 05/23/2023    HGB 9.4 (L) 05/23/2023     05/23/2023     Assessment and Plan: Lesly Hutchinson is a 73 year old female now POD #4 s/p left total hip arthroplasty for femoral neck fracture. Doing well post-operatively.     Activity: Up with assist and assistive device until independent.  --Posterior hip precautions: no internal rotation, no adduction past midline, no flexion past 90 degrees.  Weight bearing status: WBAT LLE.  Antibiotics: upon discharge, 2 week of PO Duricef 500 mg BID  Diet: Begin with clear fluids and progress diet as tolerated.   DVT prophylaxis: aspirin 81 mg twice daily for 1 month  Dressings: Keep clean, dry and intact until follow up.   Physical Therapy/Occupational Therapy: Eval and treat.  Follow-up: Clinic in 2 weeks with Dr. Ohara team. Appointment on 6/5/23 with Carrie Avendaño PA-C at [facility] at 11am.   Disposition: stable to discharge, home with home therapies    Edith Wood PA-C  [facility]  5/24/2023 at 8:38 AM

## 2023-05-25 ENCOUNTER — MEDICAL CORRESPONDENCE (OUTPATIENT)
Dept: HEALTH INFORMATION MANAGEMENT | Facility: CLINIC | Age: 73
End: 2023-05-25

## 2023-05-25 ENCOUNTER — PATIENT OUTREACH (OUTPATIENT)
Dept: CARE COORDINATION | Facility: CLINIC | Age: 73
End: 2023-05-25
Payer: COMMERCIAL

## 2023-05-25 ENCOUNTER — TELEPHONE (OUTPATIENT)
Dept: FAMILY MEDICINE | Facility: CLINIC | Age: 73
End: 2023-05-25
Payer: COMMERCIAL

## 2023-05-25 PROCEDURE — G0180 MD CERTIFICATION HHA PATIENT: HCPCS | Performed by: FAMILY MEDICINE

## 2023-05-25 PROCEDURE — G0179 MD RECERTIFICATION HHA PT: HCPCS | Performed by: FAMILY MEDICINE

## 2023-05-25 NOTE — PROGRESS NOTES
Clinic Care Coordination Contact  [facility]/Voicemail    Referral Source: IP Handoff  Clinical Data: Care Coordinator Outreach  Outreach attempted x 1.  Left message on patient's voicemail with call back information and requested return call.  Plan: Care Coordinator will try to reach patient again in 1-2 business days.    Tierra Summers, Casual RN Care Coordinator  [facility]  (Covering for Lead RN Care Coordinator)

## 2023-05-25 NOTE — LETTER
[facility] CARE COORDINATION  [address]  [place] [address]    May 26, 2023    Lesly Hutchinson  [address]  [place] [address]      Dear Amy Grace is the RN clinic care coordinator who works with Lilliam Jones MD with the [facility]. Our team has been trying to reach you recently to introduce Clinic Care Coordination. Below is a description of clinic care coordination and how we can further assist you.       The clinic care coordination team is made up of a registered nurse, , financial resource worker and community health worker who understand the health care system. The goal of clinic care coordination is to help you manage your health and improve access to the health care system. Our team works alongside your provider to assist you in determining your health and social needs. We can help you obtain health care and community resources, providing you with necessary information and education. We can work with you through any barriers and develop a care plan that helps coordinate and strengthen the communication between you and your care team.  Our services are voluntary and are offered without charge to you personally.    Please feel free to contact Amy Fan (phone [phone]) with any questions or concerns regarding care coordination and what we can offer.      We are focused on providing you with the highest-quality healthcare experience possible.    Sincerely,     [facility] Care Coordination Team

## 2023-05-25 NOTE — TELEPHONE ENCOUNTER
Order/Referral Request    Who is requesting: [facility] homecare PT    Orders being requested: Verbal orders for PT 2X week for 2 weeks, 1X week for 2 weeks,   OT eval ADL's  Medical social work eval for advance care planning.     Reason service is needed/diagnosis: Strength, balance and mobility    When are orders needed by: asap    Has this been discussed with Provider: Yes    Does patient have a preference on a Group/Provider/Facility? n    Does patient have an appointment scheduled?: No    Where to send orders: Verbal orders    Could we send this information to you in [facility] or would you prefer to receive a phone call?:   Patient would prefer a phone call   Okay to leave a detailed message?: Yes at Other phone number:  [phone]

## 2023-05-26 NOTE — PROGRESS NOTES
Clinic Care Coordination Contact  [facility]/Voicemail    Referral Source: IP Handoff  Clinical Data: Care Coordinator Outreach  Outreach attempted x 2.  Left message on patient's voicemail with call back information and requested return call.  Plan: Care Coordinator will send unable to contact letter with care coordinator contact information via [email or web address]. Care Coordinator will do no further outreaches at this time.    Tierra Summers, Casual RN Care Coordinator  [facility]  (Covering for Lead RN Care Coordinator)

## 2023-05-31 DIAGNOSIS — Z53.9 DIAGNOSIS NOT YET DEFINED: Primary | ICD-10-CM

## 2023-06-02 ENCOUNTER — MEDICAL CORRESPONDENCE (OUTPATIENT)
Dept: HEALTH INFORMATION MANAGEMENT | Facility: CLINIC | Age: 73
End: 2023-06-02
Payer: COMMERCIAL

## 2023-06-02 ENCOUNTER — TELEPHONE (OUTPATIENT)
Dept: FAMILY MEDICINE | Facility: CLINIC | Age: 73
End: 2023-06-02
Payer: COMMERCIAL

## 2023-06-02 DIAGNOSIS — Z53.9 DIAGNOSIS NOT YET DEFINED: Primary | ICD-10-CM

## 2023-06-02 NOTE — TELEPHONE ENCOUNTER
Home Care is calling regarding an established patient with [facility].       Requesting orders from: Lilliam Jones  Provider is following patient: Yes  Is this a 60-day recertification request?  No    Orders Requested    Occupational Therapy  Request for continuation of care with increase in frequency  Frequency: Once every other week for 7 weeks        Verbal orders given.  Home Care will send orders for provider to sign.    Chaparrita Fox RN

## 2023-06-02 NOTE — TELEPHONE ENCOUNTER
Order/Referral Request    Who is requesting: Mona with [facility] OT    Orders being requested: continued OT once every other week for 7 weeks.     Reason service is needed/diagnosis: Safety with cooking, laundry, and household things. Hip precautions    When are orders needed by: ASAP    Has this been discussed with Provider: No    Does patient have an appointment scheduled?: No    Where to send orders: verbal call back    Could we send this information to you in [facility] or would you prefer to receive a phone call?:   Patient would prefer a phone call   Okay to leave a detailed message?: Yes at Other phone number:  [phone]

## 2023-06-05 ENCOUNTER — MEDICAL CORRESPONDENCE (OUTPATIENT)
Dept: HEALTH INFORMATION MANAGEMENT | Facility: CLINIC | Age: 73
End: 2023-06-05
Payer: COMMERCIAL

## 2023-06-05 ENCOUNTER — TRANSFERRED RECORDS (OUTPATIENT)
Dept: HEALTH INFORMATION MANAGEMENT | Facility: CLINIC | Age: 73
End: 2023-06-05
Payer: COMMERCIAL

## 2023-06-14 ENCOUNTER — MEDICAL CORRESPONDENCE (OUTPATIENT)
Dept: HEALTH INFORMATION MANAGEMENT | Facility: CLINIC | Age: 73
End: 2023-06-14
Payer: COMMERCIAL

## 2023-06-29 ENCOUNTER — TRANSFERRED RECORDS (OUTPATIENT)
Dept: HEALTH INFORMATION MANAGEMENT | Facility: CLINIC | Age: 73
End: 2023-06-29
Payer: COMMERCIAL

## 2023-08-30 DIAGNOSIS — M06.9 RHEUMATOID ARTHRITIS, INVOLVING UNSPECIFIED SITE, UNSPECIFIED WHETHER RHEUMATOID FACTOR PRESENT (H): ICD-10-CM

## 2023-08-31 RX ORDER — GABAPENTIN 600 MG/1
600 TABLET ORAL AT BEDTIME
Qty: 90 TABLET | Refills: 0 | Status: SHIPPED | OUTPATIENT
Start: 2023-08-31 | End: 2023-11-16

## 2023-11-14 DIAGNOSIS — M06.9 RHEUMATOID ARTHRITIS, INVOLVING UNSPECIFIED SITE, UNSPECIFIED WHETHER RHEUMATOID FACTOR PRESENT (H): ICD-10-CM

## 2023-11-16 RX ORDER — GABAPENTIN 600 MG/1
600 TABLET ORAL AT BEDTIME
Qty: 90 TABLET | Refills: 3 | Status: SHIPPED | OUTPATIENT
Start: 2023-11-16 | End: 2024-02-27

## 2023-12-10 DIAGNOSIS — E03.9 HYPOTHYROIDISM, UNSPECIFIED TYPE: ICD-10-CM

## 2023-12-11 RX ORDER — LEVOTHYROXINE SODIUM 50 UG/1
TABLET ORAL
Qty: 90 TABLET | Refills: 0 | Status: SHIPPED | OUTPATIENT
Start: 2023-12-11 | End: 2024-02-27

## 2024-01-15 ENCOUNTER — PATIENT OUTREACH (OUTPATIENT)
Dept: GASTROENTEROLOGY | Facility: CLINIC | Age: 74
End: 2024-01-15
Payer: COMMERCIAL

## 2024-01-18 ENCOUNTER — PATIENT OUTREACH (OUTPATIENT)
Dept: CARE COORDINATION | Facility: CLINIC | Age: 74
End: 2024-01-18
Payer: COMMERCIAL

## 2024-02-01 ENCOUNTER — PATIENT OUTREACH (OUTPATIENT)
Dept: CARE COORDINATION | Facility: CLINIC | Age: 74
End: 2024-02-01
Payer: COMMERCIAL

## 2024-02-27 ENCOUNTER — MYC MEDICAL ADVICE (OUTPATIENT)
Dept: FAMILY MEDICINE | Facility: CLINIC | Age: 74
End: 2024-02-27
Payer: COMMERCIAL

## 2024-02-27 ENCOUNTER — PATIENT OUTREACH (OUTPATIENT)
Dept: FAMILY MEDICINE | Facility: CLINIC | Age: 74
End: 2024-02-27
Payer: COMMERCIAL

## 2024-02-27 DIAGNOSIS — E03.9 HYPOTHYROIDISM, UNSPECIFIED TYPE: ICD-10-CM

## 2024-02-27 DIAGNOSIS — M06.9 RHEUMATOID ARTHRITIS, INVOLVING UNSPECIFIED SITE, UNSPECIFIED WHETHER RHEUMATOID FACTOR PRESENT (H): ICD-10-CM

## 2024-02-27 RX ORDER — LEVOTHYROXINE SODIUM 50 UG/1
50 TABLET ORAL DAILY
Qty: 90 TABLET | Refills: 0 | Status: SHIPPED | OUTPATIENT
Start: 2024-02-27 | End: 2024-06-13

## 2024-02-27 RX ORDER — GABAPENTIN 600 MG/1
600 TABLET ORAL AT BEDTIME
Qty: 90 TABLET | Refills: 0 | Status: SHIPPED | OUTPATIENT
Start: 2024-02-27 | End: 2024-03-08

## 2024-02-27 NOTE — TELEPHONE ENCOUNTER
Patient Quality Outreach    Patient is due for the following:   Physical Annual Wellness Visit    Next Steps:   Schedule a Annual Wellness Visit    Type of outreach:    Sent [employer] message.      Questions for provider review:    None           Amparo Mathur MA

## 2024-03-02 ENCOUNTER — HEALTH MAINTENANCE LETTER (OUTPATIENT)
Age: 74
End: 2024-03-02

## 2024-03-04 SDOH — HEALTH STABILITY: PHYSICAL HEALTH: ON AVERAGE, HOW MANY DAYS PER WEEK DO YOU ENGAGE IN MODERATE TO STRENUOUS EXERCISE (LIKE A BRISK WALK)?: 0 DAYS

## 2024-03-04 ASSESSMENT — SOCIAL DETERMINANTS OF HEALTH (SDOH): HOW OFTEN DO YOU GET TOGETHER WITH FRIENDS OR RELATIVES?: ONCE A WEEK

## 2024-03-04 NOTE — COMMUNITY RESOURCES LIST (ENGLISH)
03/04/2024    [employer]  N/A  For questions about this resource list or additional care needs, please contact your primary care clinic or care manager.  Phone: [phone]   Email: N/A   Address: [address] [place] [id number]   Hours: N/A        Exercise and Recreation       Gym or workout facility  1  [employer] [facility] [place] [address] Distance: 1.79 miles      In-Person   [address] [place] [address]  Language: English  Hours: Mon - Sun Open 24 Hours  Fees: Insurance, Self Pay, Sliding Fee   Phone: (600) 985-1030 Email: [email or web address] Website: [email or web address]/     2  [facility] Distance: 4.69 miles      In-Person   [address] [place] [address]  Language: English  Hours: Mon - Fri 5:00 AM - 9:00 PM , Sat - Sun 7:00 AM - 5:00 PM  Fees: Free, Self Pay, Sliding Fee   Phone: (967) 237-3153 Email: [email or web address] Website: [email or web address]          Important Numbers & Websites       Emergency Services   911  [facility] Services   311  Poison Control   (956) 734-8677  Suicide Prevention Lifeline   (501) 341-4526 (TALK)  Child Abuse Hotline   (399) 141-2473 (4-A-Child)  Sexual Assault Hotline   (184) 983-5901 (HOPE)  National Runaway Safeline   (125) 843-8950 (RUNAWAY)  All-Options Talkline   (260) 765-5175  Substance Abuse Referral   (698) 694-3823 (HELP)

## 2024-03-08 ENCOUNTER — OFFICE VISIT (OUTPATIENT)
Dept: FAMILY MEDICINE | Facility: CLINIC | Age: 74
End: 2024-03-08
Payer: COMMERCIAL

## 2024-03-08 ENCOUNTER — ORDERS ONLY (AUTO-RELEASED) (OUTPATIENT)
Dept: FAMILY MEDICINE | Facility: CLINIC | Age: 74
End: 2024-03-08

## 2024-03-08 VITALS
TEMPERATURE: 97.8 F | DIASTOLIC BLOOD PRESSURE: 81 MMHG | SYSTOLIC BLOOD PRESSURE: 144 MMHG | BODY MASS INDEX: 24.75 KG/M2 | OXYGEN SATURATION: 95 % | WEIGHT: 145 LBS | HEIGHT: 64 IN | RESPIRATION RATE: 16 BRPM | HEART RATE: 81 BPM

## 2024-03-08 DIAGNOSIS — L65.9 HAIR THINNING: ICD-10-CM

## 2024-03-08 DIAGNOSIS — Z13.228 SCREENING FOR ENDOCRINE, METABOLIC AND IMMUNITY DISORDER: ICD-10-CM

## 2024-03-08 DIAGNOSIS — E55.9 VITAMIN D DEFICIENCY: ICD-10-CM

## 2024-03-08 DIAGNOSIS — M06.9 RHEUMATOID ARTHRITIS, INVOLVING UNSPECIFIED SITE, UNSPECIFIED WHETHER RHEUMATOID FACTOR PRESENT (H): ICD-10-CM

## 2024-03-08 DIAGNOSIS — E78.5 HYPERLIPIDEMIA LDL GOAL <100: ICD-10-CM

## 2024-03-08 DIAGNOSIS — Z13.29 SCREENING FOR ENDOCRINE, METABOLIC AND IMMUNITY DISORDER: ICD-10-CM

## 2024-03-08 DIAGNOSIS — Z00.00 ENCOUNTER FOR MEDICARE ANNUAL WELLNESS EXAM: Primary | ICD-10-CM

## 2024-03-08 DIAGNOSIS — M81.0 AGE-RELATED OSTEOPOROSIS WITHOUT CURRENT PATHOLOGICAL FRACTURE: ICD-10-CM

## 2024-03-08 DIAGNOSIS — E03.9 HYPOTHYROIDISM, UNSPECIFIED TYPE: ICD-10-CM

## 2024-03-08 DIAGNOSIS — Z12.11 SCREEN FOR COLON CANCER: ICD-10-CM

## 2024-03-08 DIAGNOSIS — Z13.0 SCREENING FOR ENDOCRINE, METABOLIC AND IMMUNITY DISORDER: ICD-10-CM

## 2024-03-08 LAB
ERYTHROCYTE [DISTWIDTH] IN BLOOD BY AUTOMATED COUNT: 12.3 % (ref 10–15)
HCT VFR BLD AUTO: 43.7 % (ref 35–47)
HGB BLD-MCNC: 14.7 G/DL (ref 11.7–15.7)
MCH RBC QN AUTO: 30.6 PG (ref 26.5–33)
MCHC RBC AUTO-ENTMCNC: 33.6 G/DL (ref 31.5–36.5)
MCV RBC AUTO: 91 FL (ref 78–100)
PLATELET # BLD AUTO: 268 10E3/UL (ref 150–450)
RBC # BLD AUTO: 4.8 10E6/UL (ref 3.8–5.2)
WBC # BLD AUTO: 4.8 10E3/UL (ref 4–11)

## 2024-03-08 PROCEDURE — 80061 LIPID PANEL: CPT | Performed by: FAMILY MEDICINE

## 2024-03-08 PROCEDURE — 83550 IRON BINDING TEST: CPT | Performed by: FAMILY MEDICINE

## 2024-03-08 PROCEDURE — 84443 ASSAY THYROID STIM HORMONE: CPT | Performed by: FAMILY MEDICINE

## 2024-03-08 PROCEDURE — G0439 PPPS, SUBSEQ VISIT: HCPCS | Performed by: FAMILY MEDICINE

## 2024-03-08 PROCEDURE — 99214 OFFICE O/P EST MOD 30 MIN: CPT | Mod: 25 | Performed by: FAMILY MEDICINE

## 2024-03-08 PROCEDURE — 83735 ASSAY OF MAGNESIUM: CPT | Performed by: FAMILY MEDICINE

## 2024-03-08 PROCEDURE — 84439 ASSAY OF FREE THYROXINE: CPT | Performed by: FAMILY MEDICINE

## 2024-03-08 PROCEDURE — 85027 COMPLETE CBC AUTOMATED: CPT | Performed by: FAMILY MEDICINE

## 2024-03-08 PROCEDURE — 83540 ASSAY OF IRON: CPT | Performed by: FAMILY MEDICINE

## 2024-03-08 PROCEDURE — 82306 VITAMIN D 25 HYDROXY: CPT | Performed by: FAMILY MEDICINE

## 2024-03-08 PROCEDURE — 82728 ASSAY OF FERRITIN: CPT | Performed by: FAMILY MEDICINE

## 2024-03-08 PROCEDURE — 36415 COLL VENOUS BLD VENIPUNCTURE: CPT | Performed by: FAMILY MEDICINE

## 2024-03-08 RX ORDER — ALENDRONATE SODIUM 70 MG/1
70 TABLET ORAL
Qty: 12 TABLET | Refills: 3 | Status: SHIPPED | OUTPATIENT
Start: 2024-03-08

## 2024-03-08 RX ORDER — GABAPENTIN 600 MG/1
600 TABLET ORAL AT BEDTIME
Qty: 90 TABLET | Refills: 0 | Status: SHIPPED | OUTPATIENT
Start: 2024-03-08 | End: 2024-06-26

## 2024-03-08 NOTE — PROGRESS NOTES
Preventive Care Visit  [facility]  Lilliam Jones MD, Family Medicine  Mar 8, 2024    Assessment & Plan     Encounter for Medicare annual wellness exam  Routine history and physical, updated in EMR.  Mammogram ordered for her.  Follow-up yearly.  Due for colon cancer screening, order Cologuard for her.  Last colonoscopy 10 years ago and unremarkable.     Rheumatoid arthritis, involving unspecified site, unspecified whether rheumatoid factor present (H) - rheumatology doesn't think she has RA  Stable.   - gabapentin (NEURONTIN) 600 MG tablet  Dispense: 90 tablet; Refill: 0    Hyperlipidemia LDL goal <100  Due for check  - Lipid panel reflex to direct LDL Non-fasting  - Lipid panel reflex to direct LDL Non-fasting    Hypothyroidism, unspecified type  Due for thyroid recheck  - TSH WITH FREE T4 REFLEX  - TSH WITH FREE T4 REFLEX    Age-related osteoporosis without current pathological fracture  Appointment to see endocrinology but not to the end of the year.  Discussed Fosamax side effects and what to expect.  Will start her on it for now and get endocrinology for their opinion if she should be on something else.  - Vitamin D Deficiency  - alendronate (FOSAMAX) 70 MG tablet  Dispense: 12 tablet; Refill: 3  - Vitamin D Deficiency    Vitamin D deficiency  - Vitamin D Deficiency  - Vitamin D Deficiency    Hair thinning  Check lab for further evaluation.  Can consider oral minoxidil.  Has some stress with her son losing his job due to workplace injury and patient having to support him financially  - CBC with platelets  - Iron and iron binding capacity  - Ferritin  - Magnesium  - CBC with platelets  - Iron and iron binding capacity  - Ferritin  - Magnesium    Screening for endocrine, metabolic and immunity disorder  - CBC with platelets  - Iron and iron binding capacity  - Ferritin  - Magnesium  - CBC with platelets  - Iron and iron binding capacity  - Ferritin  - Magnesium    Screen for  colon cancer  - COLOGUARD(EXACT SCIENCES)      Patient has been advised of split billing requirements and indicates understanding: Yes          Counseling  Appropriate preventive services were discussed with this patient, including applicable screening as appropriate for fall prevention, nutrition, physical activity, Tobacco-use cessation, weight loss and cognition.  Checklist reviewing preventive services available has been given to the patient.  Reviewed patient's diet, addressing concerns and/or questions.   The patient was provided with written information regarding signs of hearing loss.           Irwin Grace is a 74 year old, presenting for the following:  Wellness Visit        3/8/2024     9:12 AM   Additional Questions   Roomed by Alicia BANUELOS LPN         Health Care Directive  Patient does not have a Health Care Directive or Living Will: Discussed advance care planning with patient; information given to patient to review.    HPI  Chief Complaint   Patient presents with    Wellness Visit     Stressed. Her son had a work accident 5 months ago and hasn't been able to work so she's helping him financially. His has 2 children.   Had hip fracture 5/2023.   DEXA 1/2023 showed osteoporosis she has been referred to endocrinology but her appointment was cancelled and rescheduled for end of 2024. She's willing to start on fosamax in the meantime.             3/4/2024   General Health   How would you rate your overall physical health? Good   Feel stress (tense, anxious, or unable to sleep) Not at all         3/4/2024   Nutrition   Diet: Regular (no restrictions)         3/4/2024   Exercise   Days per week of moderate/strenous exercise 0 days   (!) EXERCISE CONCERN      3/4/2024   Social Factors   Frequency of gathering with friends or relatives Once a week   Worry food won't last until get money to buy more No   Food not last or not have enough money for food? No   Do you have housing?  Yes   Are you worried about  losing your housing? No   Lack of transportation? No   Unable to get utilities (heat,electricity)? No         3/8/2024   Fall Risk   Gait Speed Test (Document in seconds) 3   Gait Speed Test Interpretation Less than or equal to 5.00 seconds - PASS          3/4/2024   Activities of Daily Living- Home Safety   Needs help with the following daily activites None of the above   Safety concerns in the home None of the above         3/4/2024   Dental   Dentist two times every year? Yes         3/4/2024   Hearing Screening   Hearing concerns? (!) I FEEL THAT PEOPLE ARE MUMBLING OR NOT SPEAKING CLEARLY.    (!) I NEED TO ASK PEOPLE TO SPEAK UP OR REPEAT THEMSELVES.    (!) IT'S HARD TO FOLLOW A CONVERSATION IN A NOISY RESTAURANT OR CROWDED ROOM.    (!) TROUBLE UNDESTANDING A SPEAKER IN A PUBLIC MEETING OR [employer] SERVICE.    (!) TROUBLE UNDERSTANDING SOFT OR WHISPERED SPEECH.         3/4/2024   Driving Risk Screening   Patient/family members have concerns about driving No         3/4/2024   General Alertness/Fatigue Screening   Have you been more tired than usual lately? No         3/4/2024   Urinary Incontinence Screening   Bothered by leaking urine in past 6 months No         3/4/2024   TB Screening   Were you born outside of [place]?  No         Today's PHQ-2 Score:       3/8/2024     9:07 AM   PHQ-2 ( 1999 [employer])   Q1: Little interest or pleasure in doing things 0   Q2: Feeling down, depressed or hopeless 0   PHQ-2 Score 0   Q1: Little interest or pleasure in doing things Not at all   Q2: Feeling down, depressed or hopeless Not at all   PHQ-2 Score 0           3/4/2024   Substance Use   Alcohol more than 3/day or more than 7/wk No   Do you have a current opioid prescription? No   How severe/bad is pain from 1 to 10? 7/10   Do you use any other substances recreationally? No     Social History     Tobacco Use    Smoking status: Former    Smokeless tobacco: Former    Tobacco comments:     quit 21yrs ago           3/10/2023    LAST FHS-7 RESULTS   1st degree relative breast or ovarian cancer No   Any relative bilateral breast cancer No   Any male have breast cancer No   Any ONE woman have BOTH breast AND ovarian cancer No   Any woman with breast cancer before 50yrs No   2 or more relatives with breast AND/OR ovarian cancer No   2 or more relatives with breast AND/OR bowel cancer No        Mammogram Screening - After age 74- determine frequency with patient based on health status, life expectancy and patient goals    ASCVD Risk   The ASCVD Risk score (Amanda DK, et al., 2019) failed to calculate for the following reasons:    The valid HDL cholesterol range is 20 to 100 mg/dL            Reviewed and updated as needed this visit by Provider                    Past Medical History:   Diagnosis Date    Rheumatoid arthritis (H)      Past Surgical History:   Procedure Laterality Date    ARTHROPLASTY HIP Left 2023    Procedure: ARTHROPLASTY, HIP, TOTAL, LEFT;  Surgeon: Willem Ohara MD;  Location: [facility] [place]    ARTHROSCOPY KNEE Left 2016    FINGER GANGLION CYST EXCISION Right 10/10/2016    mucinous cyst    HC CORRECT BUNION,DOUBLE OSTEOTOMY      Description: Hallux Valgus (Bunion) Correction;  Recorded: 2009;    HC DILATION/CURETTAGE DIAG/THER NON OB      Description: Dilation And Curettage;  Recorded: 2009;    HC OSTEOTOMY METATARSAL, 1ST      Description: Metatarsal Osteotomy Of The First Metatarsal;  Recorded: 2009;    HYSTERECTOMY  ?    OOPHORECTOMY Bilateral ?     OB History    Para Term  AB Living   3 3 3 0 0 0   SAB IAB Ectopic Multiple Live Births   0 0 0 0 0      # Outcome Date GA Lbr Fan/2nd Weight Sex Delivery Anes PTL Lv   3 Term            2 Term            1 Term              Lab work is in process  Current providers sharing in care for this patient include:  Patient Care Team:  Lilliam Jones MD as PCP - General (Family Medicine)  Jack Summers, PharmD as  "Pharmacist (Pharmacist)  Lilliam Jones MD as Assigned PCP    The following health maintenance items are reviewed in Epic and correct as of today:  Health Maintenance   Topic Date Due    RSV VACCINE (Pregnancy & 60+) (1 - 1-dose 60+ series) Never done    ANNUAL REVIEW OF HM ORDERS  12/30/2022    MEDICARE ANNUAL WELLNESS VISIT  01/27/2024    LIPID  01/27/2024    TSH W/FREE T4 REFLEX  01/27/2024    COLORECTAL CANCER SCREENING  09/26/2024    FALL RISK ASSESSMENT  03/08/2025    DEXA  03/10/2025    GLUCOSE  05/23/2026    ADVANCE CARE PLANNING  01/27/2028    DTAP/TDAP/TD IMMUNIZATION (3 - Td or Tdap) 01/24/2029    HEPATITIS C SCREENING  Completed    PHQ-2 (once per calendar year)  Completed    INFLUENZA VACCINE  Completed    Pneumococcal Vaccine: 65+ Years  Completed    ZOSTER IMMUNIZATION  Completed    COVID-19 Vaccine  Completed    IPV IMMUNIZATION  Aged Out    HPV IMMUNIZATION  Aged Out    MENINGITIS IMMUNIZATION  Aged Out    RSV MONOCLONAL ANTIBODY  Aged Out    MAMMO SCREENING  Discontinued    LUNG CANCER SCREENING  Discontinued         Review of Systems  Constitutional, neuro, ENT, endocrine, pulmonary, cardiac, gastrointestinal, genitourinary, musculoskeletal, integument and psychiatric systems are negative, except as otherwise noted.     Objective    Exam  BP (!) 144/81   Pulse 81   Temp 97.8  F (36.6  C) (Oral)   Resp 16   Ht 1.626 m (5' 4\")   Wt 65.8 kg (145 lb)   SpO2 95%   BMI 24.89 kg/m     Estimated body mass index is 24.89 kg/m  as calculated from the following:    Height as of this encounter: 1.626 m (5' 4\").    Weight as of this encounter: 65.8 kg (145 lb).    Physical Exam  GENERAL: alert and no distress  EYES: Eyes grossly normal to inspection, PERRL and conjunctivae and sclerae normal  HENT: ear canals and TM's normal, nose and mouth without ulcers or lesions  NECK: no adenopathy, no asymmetry, masses, or scars  RESP: lungs clear to auscultation - no rales, rhonchi or wheezes  CV: regular " rate and rhythm, normal S1 S2, no S3 or S4, no murmur, click or rub  MS: no gross musculoskeletal defects noted, no edema  SKIN: no suspicious lesions or rashes  NEURO: Normal strength and tone, mentation intact and speech normal  PSYCH: mentation appears normal, affect normal/bright         3/8/2024   Mini Cog   Clock Draw Score 2 Normal   3 Item Recall 2 objects recalled   Mini Cog Total Score 4           Identified Health Risks:  I have reviewed Opioid Use Disorder and Substance Use Disorder risk factors and made any needed referrals.             Signed Electronically by: Lilliam Jones MD

## 2024-03-08 NOTE — PATIENT INSTRUCTIONS
Preventive Care Advice   This is general advice given by our system to help you stay healthy. However, your care team may have specific advice just for you. Please talk to your care team about your preventive care needs.  Nutrition  Eat 5 or more servings of fruits and vegetables each day.  Try wheat bread, brown rice and whole grain pasta (instead of white bread, rice, and pasta).  Get enough calcium and vitamin D. Check the label on foods and aim for 100% of the RDA (recommended daily allowance).  Lifestyle  Exercise at least 150 minutes each week   (30 minutes a day, 5 days a week).  Do muscle strengthening activities 2 days a week. These help control your weight and prevent disease.  No smoking.  Wear sunscreen to prevent skin cancer.  Have a dental exam and cleaning every 6 months.  Yearly exams  See your health care team every year to talk about:  Any changes in your health.  Any medicines your care team has prescribed.  Preventive care, family planning, and ways to prevent chronic diseases.  Shots (vaccines)   HPV shots (up to age 26), if you've never had them before.  Hepatitis B shots (up to age 59), if you've never had them before.  COVID-19 shot: Get this shot when it's due.  Flu shot: Get a flu shot every year.  Tetanus shot: Get a tetanus shot every 10 years.  Pneumococcal, hepatitis A, and RSV shots: Ask your care team if you need these based on your risk.  Shingles shot (for age 50 and up).  General health tests  Diabetes screening:  Starting at age 35, Get screened for diabetes at least every 3 years.  If you are younger than age 35, ask your care team if you should be screened for diabetes.  Cholesterol test: At age 39, start having a cholesterol test every 5 years, or more often if advised.  Bone density scan (DEXA): At age 50, ask your care team if you should have this scan for osteoporosis (brittle bones).  Hepatitis C: Get tested at least once in your life.  STIs (sexually transmitted  infections)  Before age 24: Ask your care team if you should be screened for STIs.  After age 24: Get screened for STIs if you're at risk. You are at risk for STIs (including HIV) if:  You are sexually active with more than one person.  You don't use condoms every time.  You or a partner was diagnosed with a sexually transmitted infection.  If you are at risk for HIV, ask about PrEP medicine to prevent HIV.  Get tested for HIV at least once in your life, whether you are at risk for HIV or not.  Cancer screening tests  Cervical cancer screening: If you have a cervix, begin getting regular cervical cancer screening tests at age 21. Most people who have regular screenings with normal results can stop after age 65. Talk about this with your provider.  Breast cancer scan (mammogram): If you've ever had breasts, begin having regular mammograms starting at age 40. This is a scan to check for breast cancer.  Colon cancer screening: It is important to start screening for colon cancer at age 45.  Have a colonoscopy test every 10 years (or more often if you're at risk) Or, ask your provider about stool tests like a FIT test every year or Cologuard test every 3 years.  To learn more about your testing options, visit: https://www.[employer]ec/027541.pdf.  For help making a decision, visit: https://bit.ly/sz06414.  Prostate cancer screening test: If you have a prostate and are age 55 to 69, ask your provider if you would benefit from a yearly prostate cancer screening test.  Lung cancer screening: If you are a current or former smoker age 50 to 80, ask your care team if ongoing lung cancer screenings are right for you.  For informational purposes only. Not to replace the advice of your health care provider. Copyright   2023 [employer]. All rights reserved. Clinically reviewed by the [facility] Transitions Program. S[employer] [place] [id number] - REV 01/24.    Preventing Falls: Care Instructions  Injuries and health  problems such as trouble walking or poor eyesight can increase your risk of falling. So can some medicines. But there are things you can do to help prevent falls. You can exercise to get stronger. You can also arrange your home to make it safer.    Talk to your doctor about the medicines you take. Ask if any of them increase the risk of falls and whether they can be changed or stopped.   Try to exercise regularly. It can help improve your strength and balance. This can help lower your risk of falling.     Practice fall safety and prevention.    Wear low-heeled shoes that fit well and give your feet good support. Talk to your doctor if you have foot problems that make this hard.  Carry a cellphone or wear a medical alert device that you can use to call for help.  Use stepladders instead of chairs to reach high objects. Don't climb if you're at risk for falls. Ask for help, if needed.  Wear the correct eyeglasses, if you need them.    Make your home safer.    Remove rugs, cords, clutter, and furniture from walkways.  Keep your house well lit. Use night-lights in hallways and bathrooms.  Install and use sturdy handrails on stairways.  Wear nonskid footwear, even inside. Don't walk barefoot or in socks without shoes.    Be safe outside.    Use handrails, curb cuts, and ramps whenever possible.  Keep your hands free by using a shoulder bag or backpack.  Try to walk in well-lit areas. Watch out for uneven ground, changes in pavement, and debris.  Be careful in the winter. Walk on the grass or gravel when sidewalks are slippery. Use de-icer on steps and walkways. Add non-slip devices to shoes.    Put grab bars and nonskid mats in your shower or tub and near the toilet. Try to use a shower chair or bath bench when bathing.   Get into a tub or shower by putting in your weaker leg first. Get out with your strong side first. Have a phone or medical alert device in the bathroom with you.   Where can you learn more?  Go to  "https://www.Perkle.net/patiented  Enter G117 in the search box to learn more about \"Preventing Falls: Care Instructions.\"  Current as of: July 18, 2023               Content Version: 13.8    9206-9863 [employer].   Care instructions adapted under license by your healthcare professional. If you have questions about a medical condition or this instruction, always ask your healthcare professional. [employer] disclaims any warranty or liability for your use of this information.      Hearing Loss: Care Instructions  Overview     Hearing loss is a sudden or slow decrease in how well you hear. It can range from slight to profound. Permanent hearing loss can occur with aging. It also can happen when you are exposed long-term to loud noise. Examples include listening to loud music, riding motorcycles, or being around other loud machines.  Hearing loss can affect your work and home life. It can make you feel lonely or depressed. You may feel that you have lost your independence. But hearing aids and other devices can help you hear better and feel connected to others.  Follow-up care is a key part of your treatment and safety. Be sure to make and go to all appointments, and call your doctor if you are having problems. It's also a good idea to know your test results and keep a list of the medicines you take.  How can you care for yourself at home?  Avoid loud noises whenever possible. This helps keep your hearing from getting worse.  Always wear hearing protection around loud noises.  Wear a hearing aid as directed.  A professional can help you pick a hearing aid that will work best for you.  You can also get hearing aids over the counter for mild to moderate hearing loss.  Have hearing tests as your doctor suggests. They can show whether your hearing has changed. Your hearing aid may need to be adjusted.  Use other devices as needed. These may include:  Telephone amplifiers and hearing aids that " "can connect to a television, stereo, radio, or microphone.  Devices that use lights or vibrations. These alert you to the doorbell, a ringing telephone, or a baby monitor.  Television closed-captioning. This shows the words at the bottom of the screen. Most new TVs can do this.  TTY (text telephone). This lets you type messages back and forth on the telephone instead of talking or listening. These devices are also called TDD. When messages are typed on the keyboard, they are sent over the phone line to a receiving TTY. The message is shown on a monitor.  Use text messaging, social media, and email if it is hard for you to communicate by telephone.  Try to learn a listening technique called speechreading. It is not lipreading. You pay attention to people's gestures, expressions, posture, and tone of voice. These clues can help you understand what a person is saying. Face the person you are talking to, and have them face you. Make sure the lighting is good. You need to see the other person's face clearly.  Think about counseling if you need help to adjust to your hearing loss.  When should you call for help?  Watch closely for changes in your health, and be sure to contact your doctor if:    You think your hearing is getting worse.     You have new symptoms, such as dizziness or nausea.   Where can you learn more?  Go to https://www.VivaSmart.net/patiented  Enter R798 in the search box to learn more about \"Hearing Loss: Care Instructions.\"  Current as of: February 28, 2023               Content Version: 13.8    [id number] [employer].   Care instructions adapted under license by your healthcare professional. If you have questions about a medical condition or this instruction, always ask your healthcare professional. [employer] disclaims any warranty or liability for your use of this information.      "

## 2024-03-09 LAB
CHOLEST SERPL-MCNC: 236 MG/DL
FASTING STATUS PATIENT QL REPORTED: YES
FERRITIN SERPL-MCNC: 56 NG/ML (ref 11–328)
HDLC SERPL-MCNC: 105 MG/DL
IRON BINDING CAPACITY (ROCHE): 348 UG/DL (ref 240–430)
IRON SATN MFR SERPL: 29 % (ref 15–46)
IRON SERPL-MCNC: 101 UG/DL (ref 37–145)
LDLC SERPL CALC-MCNC: 114 MG/DL
MAGNESIUM SERPL-MCNC: 2.1 MG/DL (ref 1.7–2.3)
NONHDLC SERPL-MCNC: 131 MG/DL
T4 FREE SERPL-MCNC: 1.19 NG/DL (ref 0.9–1.7)
TRIGL SERPL-MCNC: 86 MG/DL
TSH SERPL DL<=0.005 MIU/L-ACNC: 4.85 UIU/ML (ref 0.3–4.2)
VIT D+METAB SERPL-MCNC: 52 NG/ML (ref 20–50)

## 2024-03-11 ENCOUNTER — TRANSFERRED RECORDS (OUTPATIENT)
Dept: HEALTH INFORMATION MANAGEMENT | Facility: CLINIC | Age: 74
End: 2024-03-11
Payer: COMMERCIAL

## 2024-03-13 DIAGNOSIS — E03.9 HYPOTHYROIDISM, UNSPECIFIED TYPE: Primary | ICD-10-CM

## 2024-04-12 DIAGNOSIS — Z12.11 SCREEN FOR COLON CANCER: Primary | ICD-10-CM

## 2024-04-12 DIAGNOSIS — R19.5 POSITIVE COLORECTAL CANCER SCREENING USING COLOGUARD TEST: ICD-10-CM

## 2024-04-12 LAB — NONINV COLON CA DNA+OCC BLD SCRN STL QL: POSITIVE

## 2024-04-26 ENCOUNTER — ANCILLARY PROCEDURE (OUTPATIENT)
Dept: MAMMOGRAPHY | Facility: CLINIC | Age: 74
End: 2024-04-26
Attending: FAMILY MEDICINE
Payer: COMMERCIAL

## 2024-04-26 DIAGNOSIS — Z12.31 VISIT FOR SCREENING MAMMOGRAM: ICD-10-CM

## 2024-04-26 PROCEDURE — 77067 SCR MAMMO BI INCL CAD: CPT

## 2024-06-05 ENCOUNTER — LAB (OUTPATIENT)
Dept: LAB | Facility: CLINIC | Age: 74
End: 2024-06-05
Payer: COMMERCIAL

## 2024-06-05 DIAGNOSIS — E03.9 HYPOTHYROIDISM, UNSPECIFIED TYPE: ICD-10-CM

## 2024-06-05 PROCEDURE — 36415 COLL VENOUS BLD VENIPUNCTURE: CPT

## 2024-06-05 PROCEDURE — 84443 ASSAY THYROID STIM HORMONE: CPT

## 2024-06-05 PROCEDURE — 84439 ASSAY OF FREE THYROXINE: CPT

## 2024-06-06 LAB
T4 FREE SERPL-MCNC: 1.14 NG/DL (ref 0.9–1.7)
TSH SERPL DL<=0.005 MIU/L-ACNC: 6.97 UIU/ML (ref 0.3–4.2)

## 2024-06-13 DIAGNOSIS — E03.9 HYPOTHYROIDISM, UNSPECIFIED TYPE: ICD-10-CM

## 2024-06-13 RX ORDER — LEVOTHYROXINE SODIUM 75 UG/1
75 TABLET ORAL DAILY
Qty: 90 TABLET | Refills: 0 | Status: SHIPPED | OUTPATIENT
Start: 2024-06-13 | End: 2024-08-30

## 2024-06-17 ENCOUNTER — TRANSFERRED RECORDS (OUTPATIENT)
Dept: HEALTH INFORMATION MANAGEMENT | Facility: CLINIC | Age: 74
End: 2024-06-17
Payer: COMMERCIAL

## 2024-06-25 DIAGNOSIS — M06.9 RHEUMATOID ARTHRITIS, INVOLVING UNSPECIFIED SITE, UNSPECIFIED WHETHER RHEUMATOID FACTOR PRESENT (H): ICD-10-CM

## 2024-06-26 RX ORDER — GABAPENTIN 600 MG/1
TABLET ORAL
Qty: 90 TABLET | Refills: 0 | Status: SHIPPED | OUTPATIENT
Start: 2024-06-26 | End: 2024-09-11

## 2024-07-10 ENCOUNTER — TRANSFERRED RECORDS (OUTPATIENT)
Dept: HEALTH INFORMATION MANAGEMENT | Facility: CLINIC | Age: 74
End: 2024-07-10
Payer: COMMERCIAL

## 2024-08-19 ENCOUNTER — TRANSFERRED RECORDS (OUTPATIENT)
Dept: HEALTH INFORMATION MANAGEMENT | Facility: CLINIC | Age: 74
End: 2024-08-19
Payer: COMMERCIAL

## 2024-08-29 ENCOUNTER — TRANSFERRED RECORDS (OUTPATIENT)
Dept: HEALTH INFORMATION MANAGEMENT | Facility: CLINIC | Age: 74
End: 2024-08-29
Payer: COMMERCIAL

## 2024-08-30 DIAGNOSIS — E03.9 HYPOTHYROIDISM, UNSPECIFIED TYPE: ICD-10-CM

## 2024-08-30 RX ORDER — LEVOTHYROXINE SODIUM 75 UG/1
75 TABLET ORAL DAILY
Qty: 90 TABLET | Refills: 0 | Status: SHIPPED | OUTPATIENT
Start: 2024-08-30

## 2024-09-06 ENCOUNTER — TRANSFERRED RECORDS (OUTPATIENT)
Dept: HEALTH INFORMATION MANAGEMENT | Facility: CLINIC | Age: 74
End: 2024-09-06
Payer: COMMERCIAL

## 2024-09-10 DIAGNOSIS — M06.9 RHEUMATOID ARTHRITIS, INVOLVING UNSPECIFIED SITE, UNSPECIFIED WHETHER RHEUMATOID FACTOR PRESENT (H): ICD-10-CM

## 2024-09-11 RX ORDER — GABAPENTIN 600 MG/1
600 TABLET ORAL AT BEDTIME
Qty: 90 TABLET | Refills: 0 | Status: SHIPPED | OUTPATIENT
Start: 2024-09-11

## 2024-09-12 ENCOUNTER — TELEPHONE (OUTPATIENT)
Dept: FAMILY MEDICINE | Facility: CLINIC | Age: 74
End: 2024-09-12
Payer: COMMERCIAL

## 2024-09-12 NOTE — TELEPHONE ENCOUNTER
Reason for Call:  Appointment Request    Patient requesting this type of appt:  n/a    Requested provider: Lilliam Jones    Reason patient unable to be scheduled:  Unable to schedule as the message received did not specify what type visit was necessary.    When does patient want to be seen/preferred time:  n/a    Comments: Please advise what, if any, appointment needs to be made.    Could we send this information to you in [employer] or would you prefer to receive a phone call?:   Patient would like to be contacted via [employer]asis    Call taken on 9/12/2024 at 2:13 PM by Angeles Myers

## 2024-09-16 ENCOUNTER — TELEPHONE (OUTPATIENT)
Dept: FAMILY MEDICINE | Facility: CLINIC | Age: 74
End: 2024-09-16
Payer: COMMERCIAL

## 2024-09-16 NOTE — TELEPHONE ENCOUNTER
Patient Returning Call    Reason for call:  Per Patient : I have a dexa scan schedule for 9/20/24 this upcoming Friday. I am getting it done due to getting treatment for osteoporosis, I am seeing a Endocrinologist on 10/02/2024 and they want me to get this dexa scan done asap for further treatment.   My last dexa was recently done last year March 2023. I was told insurance will only cover them every two years. Now I am told I will need a prior auth for the dexa scan on 9/20/24. Can you call are my insurance [phone] to get a prior auth started?     If the prior auth takes longer I'm ok to reschedule the dexa scan but would need it asap before the endocrinologist on 10-    Information relayed to patient:  n/a    Patient has additional questions:  No      Could we [facility] this [facility] you [facility] or would you prefer to receive a phone call?:   Patient would prefer a phone call   Okay to leave a detailed message?: Yes at Cell number on file:    Telephone Information:   Mobile [phone] ok to [facility] on phone

## 2024-09-19 ENCOUNTER — LAB (OUTPATIENT)
Dept: LAB | Facility: CLINIC | Age: 74
End: 2024-09-19
Payer: COMMERCIAL

## 2024-09-19 DIAGNOSIS — E03.9 HYPOTHYROIDISM, UNSPECIFIED TYPE: ICD-10-CM

## 2024-09-19 LAB — TSH SERPL DL<=0.005 MIU/L-ACNC: 3.14 UIU/ML (ref 0.3–4.2)

## 2024-09-19 PROCEDURE — 84443 ASSAY THYROID STIM HORMONE: CPT

## 2024-09-19 PROCEDURE — 36415 COLL VENOUS BLD VENIPUNCTURE: CPT

## 2024-09-19 NOTE — TELEPHONE ENCOUNTER
Called and spoke with [facility] imaging staff. They report that the prior authorization process for imaging is handled by a separate department that works with imaging. Once an imaging appt is scheduled there is a workqueue that handles this and if any additional info is needed they will reach out to ordering provider or PCP.    Called patient and recommended that she re-schedule DEXA scan in a detailed message.    Marky Suggs RN     [facility]

## 2024-09-19 NOTE — TELEPHONE ENCOUNTER
Called [facility] provider assistance line.    Marky Suggs RN     [facility]

## 2024-09-27 ENCOUNTER — HOSPITAL ENCOUNTER (OUTPATIENT)
Dept: BONE DENSITY | Facility: HOSPITAL | Age: 74
Discharge: HOME | End: 2024-09-27
Attending: PHYSICIAN ASSISTANT | Admitting: PHYSICIAN ASSISTANT
Payer: COMMERCIAL

## 2024-09-27 DIAGNOSIS — M81.0 OSTEOPOROSIS: ICD-10-CM

## 2024-09-27 DIAGNOSIS — S32.000A LUMBAR COMPRESSION FRACTURE (H): ICD-10-CM

## 2024-09-27 PROCEDURE — 77091 TBS TECHL CALCULATION ONLY: CPT

## 2024-09-27 PROCEDURE — 77080 DXA BONE DENSITY AXIAL: CPT

## 2024-10-02 ENCOUNTER — TRANSFERRED RECORDS (OUTPATIENT)
Dept: HEALTH INFORMATION MANAGEMENT | Facility: CLINIC | Age: 74
End: 2024-10-02
Payer: COMMERCIAL

## 2024-10-04 ENCOUNTER — TRANSFERRED RECORDS (OUTPATIENT)
Dept: HEALTH INFORMATION MANAGEMENT | Facility: CLINIC | Age: 74
End: 2024-10-04

## 2024-10-04 ENCOUNTER — LAB (OUTPATIENT)
Dept: LAB | Facility: CLINIC | Age: 74
End: 2024-10-04
Payer: COMMERCIAL

## 2024-10-04 ENCOUNTER — MEDICAL CORRESPONDENCE (OUTPATIENT)
Dept: HEALTH INFORMATION MANAGEMENT | Facility: CLINIC | Age: 74
End: 2024-10-04

## 2024-10-04 DIAGNOSIS — M81.0 OSTEOPOROSIS: Primary | ICD-10-CM

## 2024-10-04 DIAGNOSIS — S32.000A LUMBAR COMPRESSION FRACTURE (H): ICD-10-CM

## 2024-10-04 DIAGNOSIS — M81.0 OSTEOPOROSIS: ICD-10-CM

## 2024-10-04 LAB
BASOPHILS # BLD AUTO: 0 10E3/UL (ref 0–0.2)
BASOPHILS NFR BLD AUTO: 1 %
EOSINOPHIL # BLD AUTO: 0.2 10E3/UL (ref 0–0.7)
EOSINOPHIL NFR BLD AUTO: 3 %
ERYTHROCYTE [DISTWIDTH] IN BLOOD BY AUTOMATED COUNT: 12.2 % (ref 10–15)
ERYTHROCYTE [SEDIMENTATION RATE] IN BLOOD BY WESTERGREN METHOD: 15 MM/HR (ref 0–30)
HCT VFR BLD AUTO: 41.5 % (ref 35–47)
HGB BLD-MCNC: 13.6 G/DL (ref 11.7–15.7)
IMM GRANULOCYTES # BLD: 0 10E3/UL
IMM GRANULOCYTES NFR BLD: 0 %
LYMPHOCYTES # BLD AUTO: 1.7 10E3/UL (ref 0.8–5.3)
LYMPHOCYTES NFR BLD AUTO: 28 %
MCH RBC QN AUTO: 29.8 PG (ref 26.5–33)
MCHC RBC AUTO-ENTMCNC: 32.8 G/DL (ref 31.5–36.5)
MCV RBC AUTO: 91 FL (ref 78–100)
MONOCYTES # BLD AUTO: 0.5 10E3/UL (ref 0–1.3)
MONOCYTES NFR BLD AUTO: 7 %
NEUTROPHILS # BLD AUTO: 3.7 10E3/UL (ref 1.6–8.3)
NEUTROPHILS NFR BLD AUTO: 61 %
PLATELET # BLD AUTO: 286 10E3/UL (ref 150–450)
RBC # BLD AUTO: 4.56 10E6/UL (ref 3.8–5.2)
WBC # BLD AUTO: 6.1 10E3/UL (ref 4–11)

## 2024-10-04 PROCEDURE — 36415 COLL VENOUS BLD VENIPUNCTURE: CPT

## 2024-10-04 PROCEDURE — 83970 ASSAY OF PARATHORMONE: CPT

## 2024-10-04 PROCEDURE — 84100 ASSAY OF PHOSPHORUS: CPT

## 2024-10-04 PROCEDURE — 85652 RBC SED RATE AUTOMATED: CPT

## 2024-10-04 PROCEDURE — 86140 C-REACTIVE PROTEIN: CPT

## 2024-10-04 PROCEDURE — 83735 ASSAY OF MAGNESIUM: CPT

## 2024-10-04 PROCEDURE — 84155 ASSAY OF PROTEIN SERUM: CPT | Mod: 59

## 2024-10-04 PROCEDURE — 80053 COMPREHEN METABOLIC PANEL: CPT

## 2024-10-04 PROCEDURE — 84165 PROTEIN E-PHORESIS SERUM: CPT | Performed by: PATHOLOGY

## 2024-10-04 PROCEDURE — 85025 COMPLETE CBC W/AUTO DIFF WBC: CPT

## 2024-10-05 LAB
ALBUMIN SERPL BCG-MCNC: 4.1 G/DL (ref 3.5–5.2)
ALP SERPL-CCNC: 69 U/L (ref 40–150)
ALT SERPL W P-5'-P-CCNC: 13 U/L (ref 0–50)
ANION GAP SERPL CALCULATED.3IONS-SCNC: 11 MMOL/L (ref 7–15)
AST SERPL W P-5'-P-CCNC: 20 U/L (ref 0–45)
BILIRUB SERPL-MCNC: 0.3 MG/DL
BUN SERPL-MCNC: 24.9 MG/DL (ref 8–23)
CALCIUM SERPL-MCNC: 9.6 MG/DL (ref 8.8–10.4)
CHLORIDE SERPL-SCNC: 105 MMOL/L (ref 98–107)
CREAT SERPL-MCNC: 1.04 MG/DL (ref 0.51–0.95)
CRP SERPL-MCNC: <3 MG/L
EGFRCR SERPLBLD CKD-EPI 2021: 56 ML/MIN/1.73M2
GLUCOSE SERPL-MCNC: 103 MG/DL (ref 70–99)
HCO3 SERPL-SCNC: 25 MMOL/L (ref 22–29)
MAGNESIUM SERPL-MCNC: 2.2 MG/DL (ref 1.7–2.3)
PHOSPHATE SERPL-MCNC: 3.1 MG/DL (ref 2.5–4.5)
POTASSIUM SERPL-SCNC: 4.5 MMOL/L (ref 3.4–5.3)
PROT SERPL-MCNC: 7.1 G/DL (ref 6.4–8.3)
PTH-INTACT SERPL-MCNC: 22 PG/ML (ref 15–65)
SODIUM SERPL-SCNC: 141 MMOL/L (ref 135–145)
TOTAL PROTEIN SERUM FOR ELP: 6.7 G/DL (ref 6.4–8.3)

## 2024-10-06 ENCOUNTER — NURSE TRIAGE (OUTPATIENT)
Dept: NURSING | Facility: CLINIC | Age: 74
End: 2024-10-06
Payer: COMMERCIAL

## 2024-10-06 NOTE — TELEPHONE ENCOUNTER
Patient is suppose to be doing the 24 hour timed urine collection that started at 0700 this morning. Patient went out to a grandchild's event and when she got home the hat in the toilet was backwards and none of her urine was collected when she went to the bathroom and it was a very large void.   Informed patient that she will need to start over on another day that the results would be altered and inaccurate without that urine collected.   Lab orders are dated so she may need to get new lab orders placed and a new collection kit.   Encouraged patient to speak with the ordering provider to see about rescheduling the lab.   Cici Barrett RN   10/06/24 12:08 PM  [facility] Nurse Advisor  Reason for Disposition    [1] Caller requesting NON-URGENT health information AND [2] PCP's office is the best resource    Protocols used: Information Only Call - No Triage-A-

## 2024-10-08 LAB
ALBUMIN SERPL ELPH-MCNC: 4 G/DL (ref 3.7–5.1)
ALPHA1 GLOB SERPL ELPH-MCNC: 0.3 G/DL (ref 0.2–0.4)
ALPHA2 GLOB SERPL ELPH-MCNC: 0.6 G/DL (ref 0.5–0.9)
B-GLOBULIN SERPL ELPH-MCNC: 0.8 G/DL (ref 0.6–1)
GAMMA GLOB SERPL ELPH-MCNC: 1 G/DL (ref 0.7–1.6)
M PROTEIN SERPL ELPH-MCNC: 0 G/DL
PROT PATTERN SERPL ELPH-IMP: NORMAL

## 2024-11-03 PROCEDURE — 81050 URINALYSIS VOLUME MEASURE: CPT

## 2024-11-03 PROCEDURE — 82340 ASSAY OF CALCIUM IN URINE: CPT

## 2024-11-04 LAB
CALCIUM 24H UR-MRATE: 0.26 G/SPEC (ref 0.1–0.3)
CALCIUM UR-MCNC: 23.5 MG/DL
COLLECT DURATION TIME UR: 24 H
SPECIMEN VOL UR: 1100 ML

## 2024-11-08 ENCOUNTER — TRANSFERRED RECORDS (OUTPATIENT)
Dept: HEALTH INFORMATION MANAGEMENT | Facility: CLINIC | Age: 74
End: 2024-11-08
Payer: COMMERCIAL

## 2024-11-11 DIAGNOSIS — E03.9 HYPOTHYROIDISM, UNSPECIFIED TYPE: ICD-10-CM

## 2024-11-11 RX ORDER — LEVOTHYROXINE SODIUM 75 UG/1
75 TABLET ORAL DAILY
Qty: 90 TABLET | Refills: 0 | Status: SHIPPED | OUTPATIENT
Start: 2024-11-11

## 2024-11-18 ENCOUNTER — TRANSFERRED RECORDS (OUTPATIENT)
Dept: HEALTH INFORMATION MANAGEMENT | Facility: CLINIC | Age: 74
End: 2024-11-18
Payer: COMMERCIAL

## 2024-12-12 DIAGNOSIS — M06.9 RHEUMATOID ARTHRITIS, INVOLVING UNSPECIFIED SITE, UNSPECIFIED WHETHER RHEUMATOID FACTOR PRESENT (H): ICD-10-CM

## 2024-12-12 RX ORDER — GABAPENTIN 600 MG/1
600 TABLET ORAL AT BEDTIME
Qty: 90 TABLET | Refills: 0 | Status: SHIPPED | OUTPATIENT
Start: 2024-12-12

## 2024-12-23 ENCOUNTER — TRANSFERRED RECORDS (OUTPATIENT)
Dept: HEALTH INFORMATION MANAGEMENT | Facility: CLINIC | Age: 74
End: 2024-12-23
Payer: COMMERCIAL

## 2025-01-15 ENCOUNTER — MEDICAL CORRESPONDENCE (OUTPATIENT)
Dept: HEALTH INFORMATION MANAGEMENT | Facility: CLINIC | Age: 75
End: 2025-01-15
Payer: COMMERCIAL

## 2025-01-16 PROBLEM — M81.8 IDIOPATHIC OSTEOPOROSIS: Status: ACTIVE | Noted: 2025-01-16

## 2025-01-23 DIAGNOSIS — E03.9 HYPOTHYROIDISM, UNSPECIFIED TYPE: ICD-10-CM

## 2025-01-23 RX ORDER — LEVOTHYROXINE SODIUM 75 UG/1
75 TABLET ORAL DAILY
Qty: 90 TABLET | Refills: 0 | Status: SHIPPED | OUTPATIENT
Start: 2025-01-23

## 2025-02-06 ENCOUNTER — INFUSION THERAPY VISIT (OUTPATIENT)
Dept: INFUSION THERAPY | Facility: HOSPITAL | Age: 75
End: 2025-02-06
Payer: COMMERCIAL

## 2025-02-06 VITALS
SYSTOLIC BLOOD PRESSURE: 152 MMHG | RESPIRATION RATE: 17 BRPM | HEART RATE: 77 BPM | OXYGEN SATURATION: 98 % | TEMPERATURE: 98 F | DIASTOLIC BLOOD PRESSURE: 74 MMHG

## 2025-02-06 DIAGNOSIS — M81.8 IDIOPATHIC OSTEOPOROSIS: Primary | ICD-10-CM

## 2025-02-06 PROCEDURE — 96372 THER/PROPH/DIAG INJ SC/IM: CPT | Performed by: INTERNAL MEDICINE

## 2025-02-06 RX ORDER — ALBUTEROL SULFATE 0.83 MG/ML
2.5 SOLUTION RESPIRATORY (INHALATION)
OUTPATIENT
Start: 2025-03-06

## 2025-02-06 RX ORDER — METHYLPREDNISOLONE SODIUM SUCCINATE 40 MG/ML
40 INJECTION INTRAMUSCULAR; INTRAVENOUS
Status: DISCONTINUED | OUTPATIENT
Start: 2025-02-06 | End: 2025-02-06 | Stop reason: HOSPADM

## 2025-02-06 RX ORDER — DIPHENHYDRAMINE HYDROCHLORIDE 50 MG/ML
50 INJECTION INTRAMUSCULAR; INTRAVENOUS
Status: DISCONTINUED | OUTPATIENT
Start: 2025-02-06 | End: 2025-02-06 | Stop reason: HOSPADM

## 2025-02-06 RX ORDER — EPINEPHRINE 1 MG/ML
0.3 INJECTION, SOLUTION INTRAMUSCULAR; SUBCUTANEOUS EVERY 5 MIN PRN
Status: DISCONTINUED | OUTPATIENT
Start: 2025-02-06 | End: 2025-02-06 | Stop reason: HOSPADM

## 2025-02-06 RX ORDER — DIPHENHYDRAMINE HYDROCHLORIDE 50 MG/ML
50 INJECTION INTRAMUSCULAR; INTRAVENOUS
Start: 2025-03-06

## 2025-02-06 RX ORDER — METHYLPREDNISOLONE SODIUM SUCCINATE 40 MG/ML
40 INJECTION INTRAMUSCULAR; INTRAVENOUS
Start: 2025-03-06

## 2025-02-06 RX ORDER — ALBUTEROL SULFATE 90 UG/1
1-2 INHALANT RESPIRATORY (INHALATION)
Start: 2025-03-06

## 2025-02-06 RX ORDER — EPINEPHRINE 1 MG/ML
0.3 INJECTION, SOLUTION INTRAMUSCULAR; SUBCUTANEOUS EVERY 5 MIN PRN
OUTPATIENT
Start: 2025-03-06

## 2025-02-06 RX ORDER — ALBUTEROL SULFATE 0.83 MG/ML
2.5 SOLUTION RESPIRATORY (INHALATION)
Status: DISCONTINUED | OUTPATIENT
Start: 2025-02-06 | End: 2025-02-06 | Stop reason: HOSPADM

## 2025-02-06 RX ORDER — DIPHENHYDRAMINE HYDROCHLORIDE 50 MG/ML
25 INJECTION INTRAMUSCULAR; INTRAVENOUS
Status: DISCONTINUED | OUTPATIENT
Start: 2025-02-06 | End: 2025-02-06 | Stop reason: HOSPADM

## 2025-02-06 RX ORDER — ALBUTEROL SULFATE 90 UG/1
1-2 INHALANT RESPIRATORY (INHALATION)
Status: DISCONTINUED | OUTPATIENT
Start: 2025-02-06 | End: 2025-02-06 | Stop reason: HOSPADM

## 2025-02-06 RX ORDER — DIPHENHYDRAMINE HYDROCHLORIDE 50 MG/ML
25 INJECTION INTRAMUSCULAR; INTRAVENOUS
Start: 2025-03-06

## 2025-02-06 RX ORDER — MEPERIDINE HYDROCHLORIDE 25 MG/ML
25 INJECTION INTRAMUSCULAR; INTRAVENOUS; SUBCUTANEOUS
Status: DISCONTINUED | OUTPATIENT
Start: 2025-02-06 | End: 2025-02-06 | Stop reason: HOSPADM

## 2025-02-06 RX ORDER — MEPERIDINE HYDROCHLORIDE 25 MG/ML
25 INJECTION INTRAMUSCULAR; INTRAVENOUS; SUBCUTANEOUS
OUTPATIENT
Start: 2025-03-06

## 2025-02-06 ASSESSMENT — PAIN SCALES - GENERAL: PAINLEVEL_OUTOF10: NO PAIN (0)

## 2025-02-06 NOTE — PROGRESS NOTES
Pt here for evenity injections.  Both injections given in left lower abdomen without complications. Sites covered with band aides. patient given discharge instructions and pt knows to call her dr with any questions or concerns.  Pt left stable and ambulatory. Pt set up future appts.

## 2025-02-20 ENCOUNTER — PATIENT OUTREACH (OUTPATIENT)
Dept: CARE COORDINATION | Facility: CLINIC | Age: 75
End: 2025-02-20
Payer: COMMERCIAL

## 2025-03-13 ENCOUNTER — TELEPHONE (OUTPATIENT)
Dept: FAMILY MEDICINE | Facility: CLINIC | Age: 75
End: 2025-03-13
Payer: COMMERCIAL

## 2025-03-13 NOTE — TELEPHONE ENCOUNTER
Incoming call from patient wanting to discuss some ongoing symptoms following treatment in urgent care in [place].  Visit note from [place] urgent care pulled in through Care Everywhere.    Patient essentially was diagnosed with bacterial conjunctivitis as well as laryngitis and acute cough on 3/7/2025.  Today she calls in requesting input on if she needs further evaluation or if she can continue with her home care.  Essentially she endorses ongoing nasal congestion, hoarse voice and cough.  She feels like her eyes have improved.     From a symptom standpoint she feels like she is improving and does not have any worsening or new symptoms such as fever, chills, sinus pain, shortness of breath etc.    We discussed that she can continue home care for now as her ongoing symptoms seem reasonable with a likely upper respiratory infection that is resolving.  Discussed home care medications as well as using devices such as a Linda pot to help with her sinuses.  If patient experiences worsening symptoms she will call back.    Marky Childers RN     [facility]  Clinic main phone #[phone]

## 2025-03-21 ENCOUNTER — ANCILLARY PROCEDURE (OUTPATIENT)
Dept: GENERAL RADIOLOGY | Facility: CLINIC | Age: 75
End: 2025-03-21
Payer: COMMERCIAL

## 2025-03-21 PROCEDURE — 71046 X-RAY EXAM CHEST 2 VIEWS: CPT | Mod: TC | Performed by: RADIOLOGY

## 2025-03-26 DIAGNOSIS — M06.9 RHEUMATOID ARTHRITIS, INVOLVING UNSPECIFIED SITE, UNSPECIFIED WHETHER RHEUMATOID FACTOR PRESENT (H): ICD-10-CM

## 2025-03-26 RX ORDER — GABAPENTIN 600 MG/1
600 TABLET ORAL AT BEDTIME
Qty: 90 TABLET | Refills: 0 | Status: SHIPPED | OUTPATIENT
Start: 2025-03-26

## 2025-04-10 DIAGNOSIS — E03.9 HYPOTHYROIDISM, UNSPECIFIED TYPE: ICD-10-CM

## 2025-04-10 RX ORDER — LEVOTHYROXINE SODIUM 75 UG/1
75 TABLET ORAL DAILY
Qty: 90 TABLET | Refills: 0 | Status: SHIPPED | OUTPATIENT
Start: 2025-04-10

## 2025-04-16 ENCOUNTER — MEDICAL CORRESPONDENCE (OUTPATIENT)
Dept: HEALTH INFORMATION MANAGEMENT | Facility: CLINIC | Age: 75
End: 2025-04-16
Payer: COMMERCIAL

## 2025-04-18 ENCOUNTER — TRANSFERRED RECORDS (OUTPATIENT)
Dept: HEALTH INFORMATION MANAGEMENT | Facility: CLINIC | Age: 75
End: 2025-04-18
Payer: COMMERCIAL

## 2025-04-21 ENCOUNTER — TRANSFERRED RECORDS (OUTPATIENT)
Dept: HEALTH INFORMATION MANAGEMENT | Facility: CLINIC | Age: 75
End: 2025-04-21
Payer: COMMERCIAL

## 2025-04-28 ENCOUNTER — TRANSFERRED RECORDS (OUTPATIENT)
Dept: HEALTH INFORMATION MANAGEMENT | Facility: CLINIC | Age: 75
End: 2025-04-28
Payer: COMMERCIAL

## 2025-04-30 SDOH — HEALTH STABILITY: PHYSICAL HEALTH: ON AVERAGE, HOW MANY DAYS PER WEEK DO YOU ENGAGE IN MODERATE TO STRENUOUS EXERCISE (LIKE A BRISK WALK)?: 0 DAYS

## 2025-04-30 ASSESSMENT — SOCIAL DETERMINANTS OF HEALTH (SDOH): HOW OFTEN DO YOU GET TOGETHER WITH FRIENDS OR RELATIVES?: ONCE A WEEK

## 2025-05-01 ENCOUNTER — OFFICE VISIT (OUTPATIENT)
Dept: FAMILY MEDICINE | Facility: CLINIC | Age: 75
End: 2025-05-01
Attending: FAMILY MEDICINE
Payer: COMMERCIAL

## 2025-05-01 VITALS
TEMPERATURE: 98.4 F | WEIGHT: 144.4 LBS | SYSTOLIC BLOOD PRESSURE: 137 MMHG | HEART RATE: 81 BPM | RESPIRATION RATE: 16 BRPM | OXYGEN SATURATION: 98 % | BODY MASS INDEX: 24.65 KG/M2 | HEIGHT: 64 IN | DIASTOLIC BLOOD PRESSURE: 72 MMHG

## 2025-05-01 DIAGNOSIS — E55.9 VITAMIN D DEFICIENCY: ICD-10-CM

## 2025-05-01 DIAGNOSIS — R60.0 LEG EDEMA: ICD-10-CM

## 2025-05-01 DIAGNOSIS — M25.562 ACUTE PAIN OF LEFT KNEE: ICD-10-CM

## 2025-05-01 DIAGNOSIS — M81.0 OSTEOPOROSIS, UNSPECIFIED OSTEOPOROSIS TYPE, UNSPECIFIED PATHOLOGICAL FRACTURE PRESENCE: ICD-10-CM

## 2025-05-01 DIAGNOSIS — Z23 NEED FOR VACCINATION: ICD-10-CM

## 2025-05-01 DIAGNOSIS — M25.552 HIP PAIN, LEFT: ICD-10-CM

## 2025-05-01 DIAGNOSIS — M81.0 AGE-RELATED OSTEOPOROSIS WITHOUT CURRENT PATHOLOGICAL FRACTURE: ICD-10-CM

## 2025-05-01 DIAGNOSIS — Z12.11 SCREEN FOR COLON CANCER: ICD-10-CM

## 2025-05-01 DIAGNOSIS — S80.12XS: ICD-10-CM

## 2025-05-01 DIAGNOSIS — Z51.81 MEDICATION MONITORING ENCOUNTER: ICD-10-CM

## 2025-05-01 DIAGNOSIS — E03.9 HYPOTHYROIDISM, UNSPECIFIED TYPE: ICD-10-CM

## 2025-05-01 DIAGNOSIS — Z87.81 HISTORY OF WRIST FRACTURE: ICD-10-CM

## 2025-05-01 DIAGNOSIS — M06.9 RHEUMATOID ARTHRITIS, INVOLVING UNSPECIFIED SITE, UNSPECIFIED WHETHER RHEUMATOID FACTOR PRESENT (H): ICD-10-CM

## 2025-05-01 DIAGNOSIS — Z00.00 ENCOUNTER FOR MEDICARE ANNUAL WELLNESS EXAM: Primary | ICD-10-CM

## 2025-05-01 DIAGNOSIS — E78.5 HYPERLIPIDEMIA LDL GOAL <100: ICD-10-CM
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
## 2025-05-01 RX ORDER — GABAPENTIN 600 MG/1
600 TABLET ORAL AT BEDTIME
Qty: 90 TABLET | Refills: 0 | Status: CANCELLED | OUTPATIENT
Start: 2025-05-01

## 2025-05-01 NOTE — PROGRESS NOTES
Preventive Care Visit  [facility]  Lilliam Jones MD, Family Medicine  May 1, 2025      Assessment & Plan     Encounter for Medicare annual wellness exam  History and physical, updated in the EMR.  Needs colonoscopy given a positive Cologuard last year.  Colonoscopy ordered for her.    Need for vaccination  She will get covid vaccine in the fall    Hyperlipidemia LDL goal <100  Due for lab  - Lipid panel reflex to direct LDL Non-fasting    Hypothyroidism, unspecified type  Due for lab  - TSH with free T4 reflex  - Comprehensive metabolic panel (BMP + Alb, Alk Phos, ALT, AST, Total. Bili, TP)    Vitamin D deficiency  - Comprehensive metabolic panel (BMP + Alb, Alk Phos, ALT, AST, Total. Bili, TP)  - Vitamin D Deficiency    Age-related osteoporosis without current pathological fracture  On prolia per rheumatology with [facility]  - Comprehensive metabolic panel (BMP + Alb, Alk Phos, ALT, AST, Total. Bili, TP)  - Vitamin D Deficiency    Rheumatoid arthritis, involving unspecified site, unspecified whether rheumatoid factor present (H) - rheumatology doesn't think she has RA  Noted    Fall, sequela  History of wrist fracture  Acute pain of left knee  Hip pain, left  Traumatic ecchymosis of multiple sites of left lower extremity, sequela  Leg edema  From falling now having bruises entire left leg and tender to palpation everywhere. Monitor for now. Advised heat/ice and compression socks. Low suspicion for DVT.   - Compression Sleeve/Stocking Order for DME - ONLY FOR DME    Screen for colon cancer  - Colonoscopy Screening  Referral      Patient has been advised of split billing requirements and indicates understanding: Yes        Counseling  Appropriate preventive services were addressed with this patient via screening, questionnaire, or discussion as appropriate for fall prevention, nutrition, physical activity, Tobacco-use cessation, social engagement, weight loss and  cognition.  Checklist reviewing preventive services available has been given to the patient.  Reviewed patient's diet, addressing concerns and/or questions.   Patient reported safety concerns were addressed today.The patient was provided with written information regarding signs of hearing loss.       Follow-up    Follow-up Visit   Expected date:  May 08, 2026 (Approximate)      Follow Up Appointment Details:     Follow-up with whom?: PCP    Follow-Up for what?: Medicare Wellness    Welcome or Annual?: Annual Wellness    How?: In Person                 The longitudinal plan of care for the diagnosis(es)/condition(s) as documented were addressed during this visit. Due to the added complexity in care, I will continue to support Lesly in the subsequent management and with ongoing continuity of care.      Irwin Grace is a 75 year old, presenting for the following:  Physical (AWV: Fasting labs, no concerns)        5/1/2025    10:11 AM   Additional Questions   Roomed by MYRNA Alvarez  Chief Complaint   Patient presents with    Physical     AWV: Fasting labs, no concerns     Fell 4/19/25 and broke her left wrist and now having left hip bruises and swelling since falling.  Last saw [facility] yesterday, 6 days status post ORIF of the left distal radius fracture.   History of osteoporosis.  On alendronate then switched to Prolia.  She wants to wait until fall for another COVID vaccine.            Advance Care Planning    Discussed advance care planning with patient; however, patient declined at this time.        4/30/2025   General Health   How would you rate your overall physical health? Good   Feel stress (tense, anxious, or unable to sleep) Only a little   (!) STRESS CONCERN      4/30/2025   Nutrition   Diet: Regular (no restrictions)         4/30/2025   Exercise   Days per week of moderate/strenous exercise 0 days   (!) EXERCISE CONCERN      4/30/2025   Social Factors   Frequency of gathering  with friends or relatives Once a week   Worry food won't last until get money to buy more No   Food not last or not have enough money for food? No   Do you have housing? (Housing is defined as stable permanent housing and does not include staying outside in a car, in a tent, in an abandoned building, in an overnight shelter, or couch-surfing.) Yes   Are you worried about losing your housing? No   Lack of transportation? No   Unable to get utilities (heat,electricity)? No         5/1/2025   Fall Risk   Gait Speed Test (Document in seconds) 3.3   Gait Speed Test Interpretation Less than or equal to 5.00 seconds - PASS          4/30/2025   Activities of Daily Living- Home Safety   Needs help with the following daily activites None of the above   Safety concerns in the home No grab bars in the bathroom         4/30/2025   Dental   Dentist two times every year? Yes         4/30/2025   Hearing Screening   Hearing concerns? (!) I NEED TO ASK PEOPLE TO SPEAK UP OR REPEAT THEMSELVES.    (!) IT'S HARDER TO UNDERSTAND WOMEN'S VOICES THAN MEN'S VOICES.    (!) IT'S HARD TO FOLLOW A CONVERSATION IN A NOISY RESTAURANT OR CROWDED ROOM.    (!) TROUBLE UNDESTANDING A SPEAKER IN A PUBLIC MEETING OR [employer] SERVICE.    (!) TROUBLE UNDERSTANDING SOFT OR WHISPERED SPEECH.       Multiple values from one day are sorted in reverse-chronological order         4/30/2025   Driving Risk Screening   Patient/family members have concerns about driving No         4/30/2025   General Alertness/Fatigue Screening   Have you been more tired than usual lately? No         4/30/2025   Urinary Incontinence Screening   Bothered by leaking urine in past 6 months No         Today's PHQ-2 Score:       4/30/2025    11:34 AM   PHQ-2 ( 1999 [employer])   Q1: Little interest or pleasure in doing things 0   Q2: Feeling down, depressed or hopeless 0   PHQ-2 Score 0    Q1: Little interest or pleasure in doing things Not at all   Q2: Feeling down, depressed or hopeless  Not at all   PHQ-2 Score 0       Patient-reported           4/30/2025   Substance Use   Alcohol more than 3/day or more than 7/wk No   Do you have a current opioid prescription? No   How severe/bad is pain from 1 to 10? 6/10   Do you use any other substances recreationally? No     Social History     Tobacco Use    Smoking status: Former    Smokeless tobacco: Former    Tobacco comments:     quit 21yrs ago           4/26/2024   LAST FHS-7 RESULTS   1st degree relative breast or ovarian cancer No   Any relative bilateral breast cancer No   Any male have breast cancer No   Any ONE woman have BOTH breast AND ovarian cancer No   Any woman with breast cancer before 50yrs No   2 or more relatives with breast AND/OR ovarian cancer No   2 or more relatives with breast AND/OR bowel cancer No        Mammogram Screening - After age 74- determine frequency with patient based on health status, life expectancy and patient goals    ASCVD Risk   The ASCVD Risk score (Amanda CUETO, et al., 2019) failed to calculate for the following reasons:    The valid HDL cholesterol range is 20 to 100 mg/dL            Reviewed and updated as needed this visit by Provider                    Past Medical History:   Diagnosis Date    Rheumatoid arthritis (H)      Past Surgical History:   Procedure Laterality Date    ARTHROPLASTY HIP Left 5/20/2023    Procedure: ARTHROPLASTY, HIP, TOTAL, LEFT;  Surgeon: Willem Ohara MD;  Location: [facility] [place] OR    ARTHROSCOPY KNEE Left 04/2016    FINGER GANGLION CYST EXCISION Right 10/10/2016    mucinous cyst    HC CORRECT BUNION,DOUBLE OSTEOTOMY      Description: Hallux Valgus (Bunion) Correction;  Recorded: 09/01/2009;    HC DILATION/CURETTAGE DIAG/THER NON OB      Description: Dilation And Curettage;  Recorded: 09/01/2009;    HC OSTEOTOMY METATARSAL, 1ST      Description: Metatarsal Osteotomy Of The First Metatarsal;  Recorded: 09/01/2009;    HYSTERECTOMY  2002?    OOPHORECTOMY Bilateral 2002?     OB  "History    Para Term  AB Living   3 3 3 0 0 0   SAB IAB Ectopic Multiple Live Births   0 0 0 0 0      # Outcome Date GA Lbr Fan/2nd Weight Sex Type Anes PTL Lv   3 Term            2 Term            1 Term              Lab work is in process  Current providers sharing in care for this patient include:  Patient Care Team:  Lilliam Jones MD as PCP - General (Family Medicine)  Jack Summers, PharmD as Pharmacist (Pharmacist)  Lilliam Jones MD as Assigned PCP    The following health maintenance items are reviewed in Epic and correct as of today:  Health Maintenance   Topic Date Due    RSV VACCINE (1 - 1-dose 75+ series) Never done    MEDICARE ANNUAL WELLNESS VISIT  2025    LIPID  2025    ANNUAL REVIEW OF HM ORDERS  2025    COVID-19 Vaccine ( season) 2025    TSH W/FREE T4 REFLEX  2025    FALL RISK ASSESSMENT  2026    DEXA  2026    DIABETES SCREENING  10/04/2027    DTAP/TDAP/TD IMMUNIZATION (3 - Td or Tdap) 2029    ADVANCE CARE PLANNING  2030    HEPATITIS C SCREENING  Completed    PHQ-2 (once per calendar year)  Completed    INFLUENZA VACCINE  Completed    Pneumococcal Vaccine: 50+ Years  Completed    ZOSTER IMMUNIZATION  Completed    HPV IMMUNIZATION  Aged Out    MENINGITIS IMMUNIZATION  Aged Out    MAMMO SCREENING  Discontinued    COLORECTAL CANCER SCREENING  Discontinued    LUNG CANCER SCREENING  Discontinued         Review of Systems  Constitutional, neuro, ENT, endocrine, pulmonary, cardiac, gastrointestinal, genitourinary, musculoskeletal, integument and psychiatric systems are negative, except as otherwise noted.     Objective    Exam  /72 (BP Location: Left arm, Patient Position: Sitting, Cuff Size: Adult Regular)   Pulse 81   Temp 98.4  F (36.9  C) (Oral)   Resp 16   Ht 1.626 m (5' 4\")   Wt 65.5 kg (144 lb 6.4 oz)   SpO2 98%   BMI 24.79 kg/m     Estimated body mass index is 24.79 kg/m  as calculated from the " "following:    Height as of this encounter: 1.626 m (5' 4\").    Weight as of this encounter: 65.5 kg (144 lb 6.4 oz).    Physical Exam  GENERAL: alert and no distress  EYES: Eyes grossly normal to inspection, PERRL and conjunctivae and sclerae normal  NECK: no adenopathy, no asymmetry, masses, or scars  RESP: lungs clear to auscultation - no rales, rhonchi or wheezes  CV: regular rate and rhythm, normal S1 S2, no S3 or S4, no murmur, click or rub, no peripheral edema  ABDOMEN: soft, nontender, no hepatosplenomegaly, no masses and bowel sounds normal  MS: no gross musculoskeletal defects noted, TTP of entire left leg and traumatic ecchymosis of left leg.   NEURO: Normal strength and tone, mentation intact and speech normal  PSYCH: mentation appears normal, affect normal/bright         5/1/2025   Mini Cog   Clock Draw Score 2 Normal   3 Item Recall 3 objects recalled   Mini Cog Total Score 5           Identified Health Risks:  I have reviewed Opioid Use Disorder and Substance Use Disorder risk factors and made any needed referrals.              Signed Electronically by: Lilliam Jones MD    "

## 2025-05-01 NOTE — PATIENT INSTRUCTIONS
Patient Education   Preventive Care Advice   This is general advice given by our system to help you stay healthy. However, your care team may have specific advice just for you. Please talk to your care team about your preventive care needs.  Nutrition  Eat 5 or more servings of fruits and vegetables each day.  Try wheat bread, brown rice and whole grain pasta (instead of white bread, rice, and pasta).  Get enough calcium and vitamin D. Check the label on foods and aim for 100% of the RDA (recommended daily allowance).  Lifestyle  Exercise at least 150 minutes each week  (30 minutes a day, 5 days a week).  Do muscle strengthening activities 2 days a week. These help control your weight and prevent disease.  No smoking.  Wear sunscreen to prevent skin cancer.  Have a dental exam and cleaning every 6 months.  Yearly exams  See your health care team every year to talk about:  Any changes in your health.  Any medicines your care team has prescribed.  Preventive care, family planning, and ways to prevent chronic diseases.  Shots (vaccines)   HPV shots (up to age 26), if you've never had them before.  Hepatitis B shots (up to age 59), if you've never had them before.  COVID-19 shot: Get this shot when it's due.  Flu shot: Get a flu shot every year.  Tetanus shot: Get a tetanus shot every 10 years.  Pneumococcal, hepatitis A, and RSV shots: Ask your care team if you need these based on your risk.  Shingles shot (for age 50 and up)  General health tests  Diabetes screening:  Starting at age 35, Get screened for diabetes at least every 3 years.  If you are younger than age 35, ask your care team if you should be screened for diabetes.  Cholesterol test: At age 39, start having a cholesterol test every 5 years, or more often if advised.  Bone density scan (DEXA): At age 50, ask your care team if you should have this scan for osteoporosis (brittle bones).  Hepatitis C: Get tested at least once in your life.  STIs (sexually  transmitted infections)  Before age 24: Ask your care team if you should be screened for STIs.  After age 24: Get screened for STIs if you're at risk. You are at risk for STIs (including HIV) if:  You are sexually active with more than one person.  You don't use condoms every time.  You or a partner was diagnosed with a sexually transmitted infection.  If you are at risk for HIV, ask about PrEP medicine to prevent HIV.  Get tested for HIV at least once in your life, whether you are at risk for HIV or not.  Cancer screening tests  Cervical cancer screening: If you have a cervix, begin getting regular cervical cancer screening tests starting at age 21.  Breast cancer scan (mammogram): If you've ever had breasts, begin having regular mammograms starting at age 40. This is a scan to check for breast cancer.  Colon cancer screening: It is important to start screening for colon cancer at age 45.  Have a colonoscopy test every 10 years (or more often if you're at risk) Or, ask your provider about stool tests like a FIT test every year or Cologuard test every 3 years.  To learn more about your testing options, visit:   .  For help making a decision, visit:   https://bit.ly/lj64246.  Prostate cancer screening test: If you have a prostate, ask your care team if a prostate cancer screening test (PSA) at age 55 is right for you.  Lung cancer screening: If you are a current or former smoker ages 50 to 80, ask your care team if ongoing lung cancer screenings are right for you.  For informational purposes only. Not to replace the advice of your health care provider. Copyright   2023 [facility]. All rights reserved. Clinically reviewed by the [facility] Transitions Program. Bunch [id number] - REV 01/24.  Eating Healthy Foods: Care Instructions  With every meal, you can make healthy food choices. Try to eat a variety of fruits, vegetables, whole grains, lean proteins, and low-fat dairy products. This can help  "you get the right balance of nutrients, including vitamins and minerals. Small changes add up over time. You can start by adding one healthy food to your meals each day.    Try to make half your plate fruits and vegetables, one-fourth whole grains, and one-fourth lean proteins. Try including dairy with your meals.   Eat more fruits and vegetables. Try to have them with most meals and snacks.   Foods for healthy eating        Fruits   These can be fresh, frozen, canned, or dried.  Try to choose whole fruit rather than fruit juice.  Eat a variety of colors.        Vegetables   These can be fresh, frozen, canned, or dried.  Beans, peas, and lentils count too.        Whole grains   Choose whole-grain breads, cereals, and noodles.  Try brown rice.        Lean proteins   These can include lean meat, poultry, fish, and eggs.  You can also have tofu, beans, peas, lentils, nuts, and seeds.        Dairy   Try milk, yogurt, and cheese.  Choose low-fat or fat-free when you can.  If you need to, use lactose-free milk or fortified plant-based milk products, such as soy milk.        Water   Drink water when you're thirsty.  Limit sugar-sweetened drinks, including soda, fruit drinks, and sports drinks.  Where can you learn more?  Go to https://www.blogTV.net/patiented  Enter T756 in the search box to learn more about \"Eating Healthy Foods: Care Instructions.\"  Current as of: October 7, 2024  Content Version: 14.4 2024-2025 [employer].   Care instructions adapted under license by your healthcare professional. If you have questions about a medical condition or this instruction, always ask your healthcare professional. [employer] disclaims any warranty or liability for your use of this information.    Nutrition for Older Adults: Care Instructions  Overview     Good nutrition is important at any age. But it is especially important for older adults. Eating healthy foods helps keep your body strong. And it " can help lower your risk for disease.  As you get older, your body needs more of certain nutrients. These include vitamin B12, calcium, and vitamin D. But it may be harder for you to get these and other important nutrients. This could be for many reasons. You may not feel as hungry as you used to. Or you could have problems with your teeth or mouth that make it hard to chew. Or you may not enjoy planning and preparing meals, especially if you live alone.  Talk with your doctor if you want help getting the most nutrition from what you eat. They may have you work with a dietitian to help you plan meals.  Follow-up care is a key part of your treatment and safety. Be sure to make and go to all appointments, and call your doctor if you are having problems. It's also a good idea to know your test results and keep a list of the medicines you take.  How can you care for yourself at home?  To stay healthy  Eat a variety of foods. The more you vary the foods you eat, the more vitamins, minerals, and other nutrients you get.  Ask your doctor if you should take a multivitamin. Choose one with about 100% of the daily value (DV) for vitamins and minerals. Do not take more than 100% of the daily value for any vitamin or mineral unless your doctor tells you to. Talk with your doctor if you are not sure which multivitamin is right for you.  Try to eat lots of fruits and vegetables. Fresh or frozen vegetables and fruits are healthy choices. Choose canned vegetables that have no salt added and fruits that are canned in their own juice or light syrup.  Include foods that are high in vitamin B12 in your diet. Good choices are fortified breakfast cereal, nonfat or low-fat milk and other dairy products, meat, poultry, fish, and eggs.  Get enough calcium and vitamin D. Good choices include nonfat or low-fat milk, cheese, and yogurt. Other good options are tofu, orange juice with added calcium, and some leafy green vegetables, such as  susan greens and kale. If you don't use milk products, talk to your doctor about calcium and vitamin D supplements.  Try to eat protein foods every day. Good choices include lean meat, fish, poultry, eggs, and cheese. Other good options are cooked beans, peanut butter, and nuts and seeds.  Choose whole grains for half of the grains you eat. Look for 100% whole wheat bread, whole-grain cereals, brown rice, and other whole grains.  If you have constipation  Eat high-fiber foods every day if you can. These include fruits, vegetables, cooked dried beans, and whole grains.  Drink plenty of fluids. If you have kidney, heart, or liver disease and have to limit fluids, talk with your doctor before you increase the amount of fluids you drink.  Ask your doctor if stool softeners may help keep your bowels regular.  If you have mouth problems that make chewing hard  Pick canned or cooked fruits and vegetables. These are often softer.  Chop or shred meat, poultry, and fish. Add sauce or gravy to the meat to help keep it moist.  Pick other protein foods that are soft. These include cheese, peanut butter, cooked beans, cottage cheese, and eggs.  If you have trouble shopping for yourself  Ask a local food store to deliver groceries to your home.  Contact your local area agency on aging and ask about resources that can help.  Ask a family member or neighbor to help you.  If you have trouble preparing meals  Try easier cooking methods such as using a slow cooker or microwave oven.  Let the grocery store do some of the work for you. Look for precut, washed, and ready-to-eat foods.  Take part in group meal programs. You can find these through senior citizen programs.  Have meals brought to your home. Your community may offer programs that deliver meals, such as Meals on Wheels. Or you could use an online meal delivery service.  If you are able, take a cooking class.  If your appetite is poor  Try to eat meals on a regular schedule.  "It may help to eat smaller meals more often throughout the day.  If you can, eat some meals with other people. You could ask family or friends to eat with you. Or you could take part in group meal programs offered in your community.  Ask your doctor if your medicines could cause appetite or taste problems. If so, ask about changing medicines.  Add spices and herbs to increase the flavor of food.  If you think you are depressed, ask your doctor for help. Depression can affect your appetite. And it can make it hard to do everyday activities like grocery shopping and making meals. Treatment can help.  When should you call for help?  Watch closely for changes in your health, and be sure to contact your doctor if you have any problems.  Where can you learn more?  Go to https://www.[employer].net/patiented  Enter L643 in the search box to learn more about \"Nutrition for Older Adults: Care Instructions.\"  Current as of: October 25, 2024  Content Version: 14.4    8849-7493 [employer]   Care instructions adapted under license by your healthcare professional. If you have questions about a medical condition or this instruction, always ask your healthcare professional. [employer] disclaims any warranty or liability for your use of this information.    Learning About Activities of Daily Living  What are activities of daily living?     Activities of daily living (ADLs) are the basic self-care tasks you do every day. These include eating, bathing, dressing, and moving around.  As you age, and if you have health problems, you may find that it's harder to do some of these tasks. If so, your doctor can suggest ideas that may help.  To measure what kind of help you may need, your doctor will ask how well you are able to do ADLs. Let your doctor know if there are any tasks that you are having trouble doing. This is an important first step to getting help. And when you have the help you need, you can stay as " independent as possible.  How will a doctor assess your ADLs?  Asking about ADLs is part of a routine health checkup your doctor will likely do as you age. Your health check might be done in a doctor's office, in your home, or at a hospital. The goal is to find out if you are having any problems that could make it hard to care for yourself or that make it unsafe for you to be on your own.  To measure your ADLs, your doctor will ask how hard it is for you to do routine tasks. Your doctor may also want to know if you have changed the way you do a task because of a health problem. Your doctor may watch how you:  Walk back and forth.  Keep your balance while you stand or walk.  Move from sitting to standing or from a bed to a chair.  Button or unbutton a shirt or sweater.  Remove and put on your shoes.  It's common to feel a little worried or anxious if you find you can't do all the things you used to be able to do. Talking with your doctor about ADLs is a way to make sure you're as safe as possible and able to care for yourself as well as you can. You may want to bring a caregiver, friend, or family member to your checkup. They can help you talk to your doctor.  Follow-up care is a key part of your treatment and safety. Be sure to make and go to all appointments, and call your doctor if you are having problems. It's also a good idea to know your test results and keep a list of the medicines you take.  Current as of: October 24, 2024  Content Version: 14.4    6520-2829 [employer].   Care instructions adapted under license by your healthcare professional. If you have questions about a medical condition or this instruction, always ask your healthcare professional. [employer] disclaims any warranty or liability for your use of this information.    Preventing Falls: Care Instructions  Injuries and health problems such as trouble walking or poor eyesight can increase your risk of falling. So can some  medicines. But there are things you can do to help prevent falls. You can exercise to get stronger. You can also arrange your home to make it safer.    Talk to your doctor about the medicines you take. Ask if any of them increase the risk of falls and whether they can be changed or stopped.   Try to exercise regularly. It can help improve your strength and balance. This can help lower your risk of falling.         Practice fall safety and prevention.   Wear low-heeled shoes that fit well and give your feet good support. Talk to your doctor if you have foot problems that make this hard.  Carry a cellphone or wear a medical alert device that you can use to call for help.  Use stepladders instead of chairs to reach high objects. Don't climb if you're at risk for falls. Ask for help, if needed.  Wear the correct eyeglasses, if you need them.        Make your home safer.   Remove rugs, cords, clutter, and furniture from walkways.  Keep your house well lit. Use night-lights in hallways and bathrooms.  Install and use sturdy handrails on stairways.  Wear nonskid footwear, even inside. Don't walk barefoot or in socks without shoes.        Be safe outside.   Use handrails, curb cuts, and ramps whenever possible.  Keep your hands free by using a shoulder bag or backpack.  Try to walk in well-lit areas. Watch out for uneven ground, changes in pavement, and debris.  Be careful in the winter. Walk on the grass or gravel when sidewalks are slippery. Use de-icer on steps and walkways. Add non-slip devices to shoes.    Put grab bars and nonskid mats in your shower or tub and near the toilet. Try to use a shower chair or bath bench when bathing.   Get into a tub or shower by putting in your weaker leg first. Get out with your strong side first. Have a phone or medical alert device in the bathroom with you.   Where can you learn more?  Go to https://www.[employer].net/patiented  Enter G117 in the search box to learn more about  "\"Preventing Falls: Care Instructions.\"  Current as of: July 31, 2024  Content Version: 14.4    8306-3066 [employer].   Care instructions adapted under license by your healthcare professional. If you have questions about a medical condition or this instruction, always ask your healthcare professional. [employer] disclaims any warranty or liability for your use of this information.    Hearing Loss: Care Instructions  Overview     Hearing loss is a sudden or slow decrease in how well you hear. It can range from slight to profound. Permanent hearing loss can occur with aging. It also can happen when you are exposed long-term to loud noise. Examples include listening to loud music, riding motorcycles, or being around other loud machines.  Hearing loss can affect your work and home life. It can make you feel lonely or depressed. You may feel that you have lost your independence. But hearing aids and other devices can help you hear better and feel connected to others.  Follow-up care is a key part of your treatment and safety. Be sure to make and go to all appointments, and call your doctor if you are having problems. It's also a good idea to know your test results and keep a list of the medicines you take.  How can you care for yourself at home?  Avoid loud noises whenever possible. This helps keep your hearing from getting worse.  Always wear hearing protection around loud noises.  Wear a hearing aid as directed.  A professional can help you pick a hearing aid that will work best for you.  You can also get hearing aids over the counter for mild to moderate hearing loss.  Have hearing tests as your doctor suggests. They can show whether your hearing has changed. Your hearing aid may need to be adjusted.  Use other devices as needed. These may include:  Telephone amplifiers and hearing aids that can connect to a television, stereo, radio, or microphone.  Devices that use lights or vibrations. These " "alert you to the doorbell, a ringing telephone, or a baby monitor.  Television closed-captioning. This shows the words at the bottom of the screen. Most new TVs can do this.  TTY (text telephone). This lets you type messages back and forth on the telephone instead of talking or listening. These devices are also called TDD. When messages are typed on the keyboard, they are sent over the phone line to a receiving TTY. The message is shown on a monitor.  Use text messaging, social media, and email if it is hard for you to communicate by telephone.  Try to learn a listening technique called speechreading. It is not lipreading. You pay attention to people's gestures, expressions, posture, and tone of voice. These clues can help you understand what a person is saying. Face the person you are talking to, and have them face you. Make sure the lighting is good. You need to see the other person's face clearly.  Think about counseling if you need help to adjust to your hearing loss.  When should you call for help?  Watch closely for changes in your health, and be sure to contact your doctor if:    You think your hearing is getting worse.     You have new symptoms, such as dizziness or nausea.   Where can you learn more?  Go to https://www.[employer]lm[employer].net/patiented  Enter R798 in the search box to learn more about \"Hearing Loss: Care Instructions.\"  Current as of: October 27, 2024  Content Version: 14.4    8416-2282 [employer].   Care instructions adapted under license by your healthcare professional. If you have questions about a medical condition or this instruction, always ask your healthcare professional. [employer] disclaims any warranty or liability for your use of this information.       "

## 2025-05-05 LAB
DEPRECATED CALCIDIOL+CALCIFEROL SERPL-MC: <56 UG/L (ref 20–75)
VITAMIN D2 SERPL-MCNC: <5 UG/L
VITAMIN D3 SERPL-MCNC: 51 UG/L

## 2025-05-16 ENCOUNTER — MEDICAL CORRESPONDENCE (OUTPATIENT)
Dept: HEALTH INFORMATION MANAGEMENT | Facility: CLINIC | Age: 75
End: 2025-05-16
Payer: COMMERCIAL

## 2025-05-19 ENCOUNTER — TRANSCRIBE ORDERS (OUTPATIENT)
Dept: OTHER | Age: 75
End: 2025-05-19

## 2025-05-19 DIAGNOSIS — R22.40: Primary | ICD-10-CM

## 2025-05-20 ENCOUNTER — TELEPHONE (OUTPATIENT)
Dept: ORTHOPEDICS | Facility: CLINIC | Age: 75
End: 2025-05-20
Payer: COMMERCIAL

## 2025-05-20 NOTE — TELEPHONE ENCOUNTER
Writer spoke with staff at [facility] and patient had MRI of left femur done at [facility] on 5/15.    Va Maki LPN

## 2025-05-20 NOTE — TELEPHONE ENCOUNTER
Other: Patient has been referred to Dr. Haile for evaluate mass posterior leg by Edith Wood from [facility]. No availability within 3 days. Please assist with scheduling.       Could we send this information to you in [employer] or would you prefer to receive a phone call?:   Patient would prefer a phone call   Okay to leave a detailed message?: Unknown at Home number on file [phone] (home)

## 2025-05-21 NOTE — TELEPHONE ENCOUNTER
Action May 21, 2025 3:02 PM MT   Action Taken Sent a request for records and imaging from [facility].        DIAGNOSIS:   Mass of leg [R22.40]  - Primary   APPOINTMENT DATE: 05/29/2025   NOTES STATUS DETAILS   OFFICE NOTE from referring provider In process Provider: Edith Wood  Affiliated with: [facility]   DISCHARGE SUMMARY from hospital Internal 5/19/2023 - 5/24/2023 (5 days)  [facility]     OPERATIVE REPORT Internal 05/20/2023 - LEFT DANIEL   MRI PACS [facility]:  05/15/2025 - Left Femur   CT SCAN PACS Internal:  05/19/2023 - Left Hip   XRAYS (IMAGES & REPORTS) PACS Internal:  05/19/2023 - Hip/Pelvis  07/23/2015 - Bilateral Hip

## 2025-05-29 ENCOUNTER — PRE VISIT (OUTPATIENT)
Dept: ORTHOPEDICS | Facility: CLINIC | Age: 75
End: 2025-05-29

## 2025-05-29 ENCOUNTER — OFFICE VISIT (OUTPATIENT)
Dept: ORTHOPEDICS | Facility: CLINIC | Age: 75
End: 2025-05-29
Payer: COMMERCIAL

## 2025-05-29 DIAGNOSIS — T14.8XXA HEMATOMA OF SKIN: Primary | ICD-10-CM

## 2025-05-29 NOTE — LETTER
5/29/2025      Lesly Hutchinson  [address]  [place] [address] [address]      Dear Colleague,    Thank you for referring your patient, Lesly Hutchinson, to the [facility] [place]. Please see a copy of my visit note below.    Impression:  left hamstring partial tear and post traumatic hematoma. Significant bleeding at site of injury. No evidence of tumor or cancer.    Plan: 1. Duarte hose compression stocking, long leg.  2. MRI scan with gadolinium left thigh to assess size of presumed hematoma    Patient is a 75-year-old female who fell onto her left side in April 19 of this year.  She sustained wrist fracture which has been treated elsewhere but also developed several weeks after the fall extensive ecchymosis extending essentially from the left hip and groin to the foot.  She had significant pain which has resolved over time and she reports today she is not having pain.  She is concerned mainly about the swelling of her foot and less concerned at this time about pain.    On exam I do not see any substantial residual ecchymosis.  The mass seen on MRI scan to me is not palpable.    An MRI scan was obtained to evaluate for hamstrings tear.  This did not show a significant disruption of the hamstring or avulsion off the ischium but it does show what I believe is a large hematoma.    I reviewed these findings with the patient.  I reassured her that at this time I see no evidence that she has a malignancy but would like to have a follow-up in 2 months to be sure the presumed size of the hematoma is reduced.    Total length of today's visit was greater than 30 minutes.    Again, thank you for allowing me to participate in the care of your patient.        Sincerely,        Giorgio Haile MD    Electronically signed

## 2025-05-29 NOTE — PROGRESS NOTES
Impression:  left hamstring partial tear and post traumatic hematoma. Significant bleeding at site of injury. No evidence of tumor or cancer.    Plan: 1. Duarte hose compression stocking, long leg.  2. MRI scan with gadolinium left thigh to assess size of presumed hematoma    Patient is a 75-year-old female who fell onto her left side in April 19 of this year.  She sustained wrist fracture which has been treated elsewhere but also developed several weeks after the fall extensive ecchymosis extending essentially from the left hip and groin to the foot.  She had significant pain which has resolved over time and she reports today she is not having pain.  She is concerned mainly about the swelling of her foot and less concerned at this time about pain.    On exam I do not see any substantial residual ecchymosis.  The mass seen on MRI scan to me is not palpable.    An MRI scan was obtained to evaluate for hamstrings tear.  This did not show a significant disruption of the hamstring or avulsion off the ischium but it does show what I believe is a large hematoma.    I reviewed these findings with the patient.  I reassured her that at this time I see no evidence that she has a malignancy but would like to have a follow-up in 2 months to be sure the presumed size of the hematoma is reduced.    Total length of today's visit was greater than 30 minutes.

## 2025-05-29 NOTE — NURSING NOTE
Chief Complaint   Patient presents with    Consult     Posterior left thigh mass referred by ADRIEL Ryder, at [facility]. Pt notes a fall in April where she landed on her left side        75 year old  1950    Primary MD: Lilliam Jones Tram  Ref. MD: Edith Wood                 Pain Assessment  Patient Currently in Pain: Yes  Additional Pain Assessment Tools:  (2)  Primary Pain Location: Back (left lower back area)             [id number] - [address]           Allergies   Allergen Reactions    Sulfa (Sulfonamide Antibiotics) [Sulfa Antibiotics] Unknown           Current Outpatient Medications   Medication Sig Dispense Refill    B Complex-C (SUPER B COMPLEX PO) Take 1 tablet by mouth daily.      calcium-vitamin D (CALCIUM-VITAMIN D) 500 mg(1,250mg) -200 unit per tablet [CALCIUM-VITAMIN D (CALCIUM-VITAMIN D) 500 MG(1,250MG) -200 UNIT PER TABLET] Take 1 tablet by mouth.      denosumab (PROLIA) 60 mg/mL injection Inject 60 mg subcutaneously every 6 months.      gabapentin (NEURONTIN) 600 MG tablet TAKE ONE TABLET BY MOUTH ONCE DAILY AT BEDTIME 90 tablet 0    levothyroxine (SYNTHROID/LEVOTHROID) 75 MCG tablet Take 1 tablet (75 mcg) by mouth daily. 90 tablet 0    Multiple Vitamin (MULTIVITAMIN ADULT PO) Take 1 tablet by mouth daily.      ibuprofen (ADVIL,MOTRIN) 600 MG tablet [IBUPROFEN (ADVIL,MOTRIN) 600 MG TABLET] Take 600 mg by mouth every 6 (six) hours as needed for pain.       No current facility-administered medications for this visit.

## 2025-05-29 NOTE — PATIENT INSTRUCTIONS
Impression:  left hamstring partial tear and post traumatic hematoma. Significant bleeding at site of injury. No evidence of tumor or cancer.    Plan: 1. Duarte hose compression stocking, long leg.  2. MRI scan with gadolinium left thigh to assess size of presumed hematoma

## 2025-05-30 ENCOUNTER — TELEPHONE (OUTPATIENT)
Dept: ORTHOPEDICS | Facility: CLINIC | Age: 75
End: 2025-05-30
Payer: COMMERCIAL

## 2025-05-30 NOTE — TELEPHONE ENCOUNTER
Other: derrick called she saw Dr. Haile on 05/29/25 and forgot to ask him a question. The question is she is wondering if she can ride her bike because she was diagnosed with a partial hamstring tear and hematoma. Please call patient to discuss     Could we send this information to you in [facility] or would you prefer to receive a phone call?:   Patient would prefer a phone call   Okay to leave a detailed message?: Yes at Cell number on file:    Telephone Information:   Mobile [phone]

## 2025-06-02 NOTE — CONFIDENTIAL NOTE
Returned patient call. Relayed okay to ride her bike as tolerated. She verbalized understanding and will reach out with any questions or concerns.     Tara Holter, RNCC

## 2025-06-27 ENCOUNTER — TRANSFERRED RECORDS (OUTPATIENT)
Dept: HEALTH INFORMATION MANAGEMENT | Facility: CLINIC | Age: 75
End: 2025-06-27
Payer: COMMERCIAL

## 2025-07-07 DIAGNOSIS — M06.9 RHEUMATOID ARTHRITIS, INVOLVING UNSPECIFIED SITE, UNSPECIFIED WHETHER RHEUMATOID FACTOR PRESENT (H): ICD-10-CM

## 2025-07-07 NOTE — TELEPHONE ENCOUNTER
Pending Prescriptions:                       Disp   Refills    gabapentin (NEURONTIN) 600 MG tablet      90 tab*0            Sig: Take 1 tablet (600 mg) by mouth at bedtime.

## 2025-07-10 RX ORDER — GABAPENTIN 600 MG/1
600 TABLET ORAL AT BEDTIME
Qty: 90 TABLET | Refills: 0 | Status: SHIPPED | OUTPATIENT
Start: 2025-07-10

## 2025-07-24 ENCOUNTER — ANCILLARY PROCEDURE (OUTPATIENT)
Dept: MAMMOGRAPHY | Facility: CLINIC | Age: 75
End: 2025-07-24
Attending: FAMILY MEDICINE
Payer: COMMERCIAL

## 2025-07-24 DIAGNOSIS — Z12.31 SCREENING MAMMOGRAM FOR BREAST CANCER: ICD-10-CM

## 2025-07-24 PROCEDURE — 77063 BREAST TOMOSYNTHESIS BI: CPT

## 2025-07-29 ENCOUNTER — VIRTUAL VISIT (OUTPATIENT)
Dept: ORTHOPEDICS | Facility: CLINIC | Age: 75
End: 2025-07-29
Attending: ORTHOPAEDIC SURGERY
Payer: COMMERCIAL

## 2025-07-29 DIAGNOSIS — T14.8XXA HEMATOMA OF SKIN: Primary | ICD-10-CM

## 2025-07-29 PROCEDURE — 98004 SYNCH AUDIO-VIDEO EST SF 10: CPT | Performed by: ORTHOPAEDIC SURGERY

## 2025-07-29 PROCEDURE — 1126F AMNT PAIN NOTED NONE PRSNT: CPT | Mod: 95 | Performed by: ORTHOPAEDIC SURGERY

## 2025-07-29 ASSESSMENT — PAIN SCALES - GENERAL: PAINLEVEL_OUTOF10: NO PAIN (0)

## 2025-07-29 NOTE — NURSING NOTE
Patient reviewed medications and allergies in Mychart during e-check in and said everything looked correct.      Current patient location: At work    Is the patient currently in the state of [place]? YES    Visit mode: VIDEO    If the visit is dropped, the patient can be reconnected by:VIDEO VISIT: Text to cell phone:   Telephone Information:   Mobile [phone]       Will anyone else be joining the visit? NO  (If patient encounters technical issues they should call [phone])    Are changes needed to the allergy or medication list? No    Are refills needed on medications prescribed by this physician? NO    Rooming Documentation:  Not applicable    Reason for visit: RECHECK (MRI results/Next steps)    Amparo YO

## 2025-07-29 NOTE — LETTER
7/29/2025      Lesly Hutchinson  [address]  [place] [address]      Dear Colleague,    Thank you for referring your patient, Lesly Hutchinson, to the [facility]. Please see a copy of my visit note below.    Virtual Visit Details    Type of service:  Video Visit       Impression: Large left thigh hematoma resolved.    Plan: Follow-up as needed    Patient was seen in follow-up from the visit approximately 2 months ago where she was seen for an alleged tumor which we thought was most likely a posttraumatic hematoma in the left thigh.  She reports her symptoms have completely resolved since that time and is currently active having no difficulty except for her right knee.  She is seeing the [facility] for her right knee and I encouraged her to continue under their care.    Regarding the left thigh mass this is completely resolved on her MRI scan essentially proving it was a hematoma.  We will see her back on an as-needed basis.    This virtual visit began at 1:23 PM and ended at 1:31 PM.  The total length of the visit was 8 minutes.        Again, thank you for allowing me to participate in the care of your patient.        Sincerely,        Giorgio Haile MD    Electronically signed

## 2025-07-29 NOTE — PROGRESS NOTES
Virtual Visit Details    Type of service:  Video Visit       Impression: Large left thigh hematoma resolved.    Plan: Follow-up as needed    Patient was seen in follow-up from the visit approximately 2 months ago where she was seen for an alleged tumor which we thought was most likely a posttraumatic hematoma in the left thigh.  She reports her symptoms have completely resolved since that time and is currently active having no difficulty except for her right knee.  She is seeing the [facility] for her right knee and I encouraged her to continue under their care.    Regarding the left thigh mass this is completely resolved on her MRI scan essentially proving it was a hematoma.  We will see her back on an as-needed basis.    This virtual visit began at 1:23 PM and ended at 1:31 PM.  The total length of the visit was 8 minutes.

## 2025-07-30 DIAGNOSIS — E03.9 HYPOTHYROIDISM, UNSPECIFIED TYPE: ICD-10-CM

## 2025-07-30 RX ORDER — LEVOTHYROXINE SODIUM 75 UG/1
75 TABLET ORAL DAILY
Qty: 90 TABLET | Refills: 2 | Status: SHIPPED | OUTPATIENT
Start: 2025-07-30

## (undated) DEVICE — SOLUTION IRRIG 2B7127 .9NS 3000ML BAG

## (undated) DEVICE — SUCTION MANIFOLD NEPTUNE 2 SYS 4 PORT 0702-020-000

## (undated) DEVICE — GLOVE BIOGEL PI ULTRATOUCH G SZ 7.5 42175

## (undated) DEVICE — GLOVE BIOGEL PI ORTHOPRO SZ 7.5 47675

## (undated) DEVICE — SU DERMABOND ADVANCED .7ML DNX12

## (undated) DEVICE — PAD HIP POSITIONING MCGUIRE 707-CPM

## (undated) DEVICE — CLEANSER JET LAVAGE IRRISEPT 0.05% CHG IRRISEPT45USA

## (undated) DEVICE — HOLDER LIMB VELCRO OR 0814-1533

## (undated) DEVICE — DECANTER VIAL 2006S

## (undated) DEVICE — BLADE SAW SAGITTAL STRK WIDE 25.4X85X1.2MM 2108-151-000

## (undated) DEVICE — BONE CLEANING TIP INTERPULSE  0210-010-000

## (undated) DEVICE — SUCTION IRR SYSTEM W/O TIP INTERPULSE HANDPIECE 0210-100-000

## (undated) DEVICE — SUTURE VICRYL+ 0 27IN CT-1 UND VCP260H

## (undated) DEVICE — CUSTOM PACK TOTAL HIP SOP5BTHHEA

## (undated) DEVICE — DRESSING MEPILEX AG SILVER 4X8 395890

## (undated) DEVICE — ESU ELEC BLADE 6" COATED E1450-6

## (undated) DEVICE — PREP CHLORAPREP 26ML TINTED HI-LITE ORANGE 930815

## (undated) DEVICE — GLOVE BIOGEL INDICATOR 7.5 LF 41675

## (undated) DEVICE — SUTURE VICRYL+ 2-0 27IN CT-1 UND VCP259H

## (undated) DEVICE — SOL WATER IRRIG 1000ML BOTTLE 2F7114

## (undated) DEVICE — GLOVE BIOGEL PI INDICATOR 8.0 LF 41680

## (undated) DEVICE — A3 SUPPLIES- SEE NURSING INFO PAGE

## (undated) DEVICE — PLATE GROUNDING ADULT W/CORD 9165L

## (undated) DEVICE — MAT FLOOR SURGICAL 40X38 0702140238

## (undated) DEVICE — SU STRATAFIX PDS PLUS 1 CT-1 18" SXPP1A404

## (undated) DEVICE — SOL NACL 0.9% IRRIG 1000ML BOTTLE 2F7124

## (undated) DEVICE — SU MONOCRYL 3-0 PS-2 18" UND MCP497G

## (undated) DEVICE — SU ETHIBOND 5 V-37 4X30" MB66G

## (undated) DEVICE — DRAPE IOBAN INCISE 36X23" 6651EZ

## (undated) RX ORDER — DEXAMETHASONE SODIUM PHOSPHATE 10 MG/ML
INJECTION, SOLUTION INTRAMUSCULAR; INTRAVENOUS
Status: DISPENSED
Start: 2023-05-20

## (undated) RX ORDER — FENTANYL CITRATE-0.9 % NACL/PF 10 MCG/ML
PLASTIC BAG, INJECTION (ML) INTRAVENOUS
Status: DISPENSED
Start: 2023-05-20

## (undated) RX ORDER — FENTANYL CITRATE 50 UG/ML
INJECTION, SOLUTION INTRAMUSCULAR; INTRAVENOUS
Status: DISPENSED
Start: 2023-05-20

## (undated) RX ORDER — VANCOMYCIN HYDROCHLORIDE 1 G/20ML
INJECTION, POWDER, LYOPHILIZED, FOR SOLUTION INTRAVENOUS
Status: DISPENSED
Start: 2023-05-20

## (undated) RX ORDER — PROPOFOL 10 MG/ML
INJECTION, EMULSION INTRAVENOUS
Status: DISPENSED
Start: 2023-05-20